# Patient Record
Sex: FEMALE | Race: WHITE | NOT HISPANIC OR LATINO | Employment: UNEMPLOYED | URBAN - METROPOLITAN AREA
[De-identification: names, ages, dates, MRNs, and addresses within clinical notes are randomized per-mention and may not be internally consistent; named-entity substitution may affect disease eponyms.]

---

## 2022-08-01 ENCOUNTER — APPOINTMENT (OUTPATIENT)
Dept: MRI IMAGING | Facility: HOSPITAL | Age: 57
End: 2022-08-01
Attending: EMERGENCY MEDICINE

## 2022-08-01 ENCOUNTER — HOSPITAL ENCOUNTER (EMERGENCY)
Facility: HOSPITAL | Age: 57
Discharge: HOME | End: 2022-08-01
Attending: EMERGENCY MEDICINE | Admitting: EMERGENCY MEDICINE

## 2022-08-01 VITALS
HEART RATE: 74 BPM | TEMPERATURE: 98.3 F | RESPIRATION RATE: 17 BRPM | SYSTOLIC BLOOD PRESSURE: 150 MMHG | DIASTOLIC BLOOD PRESSURE: 98 MMHG | WEIGHT: 150 LBS | OXYGEN SATURATION: 98 %

## 2022-08-01 DIAGNOSIS — R42 DIZZINESS: ICD-10-CM

## 2022-08-01 LAB
ANION GAP SERPL CALCULATED.3IONS-SCNC: 8 MMOL/L (ref 5–18)
BASOPHILS # BLD AUTO: 0.1 10E3/UL (ref 0–0.2)
BASOPHILS NFR BLD AUTO: 1 %
BUN SERPL-MCNC: 22 MG/DL (ref 8–22)
CALCIUM SERPL-MCNC: 8.7 MG/DL (ref 8.5–10.5)
CHLORIDE BLD-SCNC: 107 MMOL/L (ref 98–107)
CO2 SERPL-SCNC: 26 MMOL/L (ref 22–31)
CREAT SERPL-MCNC: 0.76 MG/DL (ref 0.6–1.1)
EOSINOPHIL # BLD AUTO: 0.2 10E3/UL (ref 0–0.7)
EOSINOPHIL NFR BLD AUTO: 4 %
ERYTHROCYTE [DISTWIDTH] IN BLOOD BY AUTOMATED COUNT: 11.8 % (ref 10–15)
ETHANOL SERPL-MCNC: <10 MG/DL
GFR SERPL CREATININE-BSD FRML MDRD: >90 ML/MIN/1.73M2
GLUCOSE BLD-MCNC: 127 MG/DL (ref 70–125)
HCG SERPL QL: NEGATIVE
HCT VFR BLD AUTO: 41.9 % (ref 35–47)
HGB BLD-MCNC: 14.3 G/DL (ref 11.7–15.7)
IMM GRANULOCYTES # BLD: 0 10E3/UL
IMM GRANULOCYTES NFR BLD: 0 %
LYMPHOCYTES # BLD AUTO: 1.6 10E3/UL (ref 0.8–5.3)
LYMPHOCYTES NFR BLD AUTO: 30 %
MCH RBC QN AUTO: 33.3 PG (ref 26.5–33)
MCHC RBC AUTO-ENTMCNC: 34.1 G/DL (ref 31.5–36.5)
MCV RBC AUTO: 97 FL (ref 78–100)
MONOCYTES # BLD AUTO: 0.5 10E3/UL (ref 0–1.3)
MONOCYTES NFR BLD AUTO: 10 %
NEUTROPHILS # BLD AUTO: 3.1 10E3/UL (ref 1.6–8.3)
NEUTROPHILS NFR BLD AUTO: 55 %
NRBC # BLD AUTO: 0 10E3/UL
NRBC BLD AUTO-RTO: 0 /100
PLATELET # BLD AUTO: 258 10E3/UL (ref 150–450)
POTASSIUM BLD-SCNC: 3.3 MMOL/L (ref 3.5–5)
RBC # BLD AUTO: 4.3 10E6/UL (ref 3.8–5.2)
SODIUM SERPL-SCNC: 141 MMOL/L (ref 136–145)
WBC # BLD AUTO: 5.5 10E3/UL (ref 4–11)

## 2022-08-01 PROCEDURE — 96361 HYDRATE IV INFUSION ADD-ON: CPT

## 2022-08-01 PROCEDURE — 250N000013 HC RX MED GY IP 250 OP 250 PS 637: Performed by: EMERGENCY MEDICINE

## 2022-08-01 PROCEDURE — A9585 GADOBUTROL INJECTION: HCPCS | Performed by: EMERGENCY MEDICINE

## 2022-08-01 PROCEDURE — 250N000011 HC RX IP 250 OP 636: Performed by: EMERGENCY MEDICINE

## 2022-08-01 PROCEDURE — 85025 COMPLETE CBC W/AUTO DIFF WBC: CPT | Performed by: EMERGENCY MEDICINE

## 2022-08-01 PROCEDURE — 255N000002 HC RX 255 OP 636: Performed by: EMERGENCY MEDICINE

## 2022-08-01 PROCEDURE — 258N000003 HC RX IP 258 OP 636: Performed by: EMERGENCY MEDICINE

## 2022-08-01 PROCEDURE — 84703 CHORIONIC GONADOTROPIN ASSAY: CPT | Performed by: EMERGENCY MEDICINE

## 2022-08-01 PROCEDURE — 96374 THER/PROPH/DIAG INJ IV PUSH: CPT | Mod: 59

## 2022-08-01 PROCEDURE — 36415 COLL VENOUS BLD VENIPUNCTURE: CPT | Performed by: EMERGENCY MEDICINE

## 2022-08-01 PROCEDURE — 82077 ASSAY SPEC XCP UR&BREATH IA: CPT | Performed by: EMERGENCY MEDICINE

## 2022-08-01 PROCEDURE — 99285 EMERGENCY DEPT VISIT HI MDM: CPT | Mod: 25

## 2022-08-01 PROCEDURE — 80048 BASIC METABOLIC PNL TOTAL CA: CPT | Performed by: EMERGENCY MEDICINE

## 2022-08-01 PROCEDURE — 70553 MRI BRAIN STEM W/O & W/DYE: CPT

## 2022-08-01 RX ORDER — MECLIZINE HYDROCHLORIDE 25 MG/1
25 TABLET ORAL 3 TIMES DAILY PRN
Qty: 20 TABLET | Refills: 0 | Status: SHIPPED | OUTPATIENT
Start: 2022-08-01 | End: 2022-11-20

## 2022-08-01 RX ORDER — GADOBUTROL 604.72 MG/ML
7 INJECTION INTRAVENOUS ONCE
Status: COMPLETED | OUTPATIENT
Start: 2022-08-01 | End: 2022-08-01

## 2022-08-01 RX ORDER — MECLIZINE HCL 12.5 MG 12.5 MG/1
25 TABLET ORAL ONCE
Status: COMPLETED | OUTPATIENT
Start: 2022-08-01 | End: 2022-08-01

## 2022-08-01 RX ORDER — ALBUTEROL SULFATE 90 UG/1
2 AEROSOL, METERED RESPIRATORY (INHALATION) EVERY 6 HOURS PRN
Status: DISCONTINUED | OUTPATIENT
Start: 2022-08-01 | End: 2022-08-02 | Stop reason: HOSPADM

## 2022-08-01 RX ORDER — ONDANSETRON 2 MG/ML
4 INJECTION INTRAMUSCULAR; INTRAVENOUS ONCE
Status: COMPLETED | OUTPATIENT
Start: 2022-08-01 | End: 2022-08-01

## 2022-08-01 RX ORDER — ACETAMINOPHEN 325 MG/1
650 TABLET ORAL ONCE
Status: DISCONTINUED | OUTPATIENT
Start: 2022-08-01 | End: 2022-08-02 | Stop reason: HOSPADM

## 2022-08-01 RX ADMIN — ONDANSETRON 4 MG: 2 INJECTION INTRAMUSCULAR; INTRAVENOUS at 16:47

## 2022-08-01 RX ADMIN — GADOBUTROL 7 ML: 604.72 INJECTION INTRAVENOUS at 21:06

## 2022-08-01 RX ADMIN — SODIUM CHLORIDE 1000 ML: 9 INJECTION, SOLUTION INTRAVENOUS at 16:47

## 2022-08-01 RX ADMIN — MECLIZINE 25 MG: 12.5 TABLET ORAL at 17:16

## 2022-08-01 NOTE — ED TRIAGE NOTES
Patient arrives by private car for evaluation of dizziness on and off for the last couple days and has been consistent since she woke this am.  Reports nausea and vomiting since yesterday.   Reports daily use of alcohol and meth.  Patient reports history of brain tumor.

## 2022-08-01 NOTE — ED PROVIDER NOTES
EMERGENCY DEPARTMENT ENCOUNTER      NAME: Jody Barrow  AGE: 57 year old female  YOB: 1965  MRN: [id number]  EVALUATION DATE & TIME: 2022  4:23 PM    PCP: No primary care provider on file.    ED PROVIDER: Klaus Chirinos M.D.      Chief Complaint   Patient presents with     Dizziness       FINAL IMPRESSION:  1. Dizziness        ED COURSE & MEDICAL DECISION MAKIN year old female presents to the Emergency Department for evaluation of vertigo and lightheadedness.  She has a history pertinent for reported pituitary tumor, alcohol and substance abuse.  She presents to the emergency department with dizziness which is primarily described as vertigo, with some occasional lightheadedness.  She does seem to struggle with dysmetria in the bilateral upper extremities on arrival.  The remainder of her neurological exam is nonfocal.  Cardiopulmonary exam unremarkable.  She was treated with IV fluids, Zofran, and meclizine here with good improvement in her symptoms.  She underwent a broad work-up including an MRI of her brain with and without contrast which actually did not visualize any pituitary tumor although was not timed for this specifically.  There is no evidence of CVA, stroke, or inflammatory change.  Labs with stable metabolic profile.  She was feeling symptomatically improved after initial interventions here, she is ambulatory.  She is homeless but plans to be in this area for now.  Encouraged her to set up primary care.  We reviewed instructions for supportive measures for vertigo.  She was prescribed meclizine.  She was discharged in stable condition.    At the conclusion of the encounter I discussed the results of all of the tests and the disposition. The questions were answered. The patient or family acknowledged understanding and was agreeable with the care plan.       MEDICATIONS GIVEN IN THE EMERGENCY:  Medications   albuterol (PROVENTIL HFA/VENTOLIN HFA) inhaler (has no  administration in time range)   acetaminophen (TYLENOL) tablet 650 mg (650 mg Oral Not Given 8/1/22 2157)   0.9% sodium chloride BOLUS (0 mLs Intravenous Stopped 8/1/22 2000)   ondansetron (ZOFRAN) injection 4 mg (4 mg Intravenous Given 8/1/22 1647)   meclizine (ANTIVERT) tablet 25 mg (25 mg Oral Given 8/1/22 1716)   gadobutrol (GADAVIST) injection 7 mL (7 mLs Intravenous Given 8/1/22 2106)       NEW PRESCRIPTIONS STARTED AT TODAY'S ER VISIT  Discharge Medication List as of 8/1/2022 10:08 PM      START taking these medications    Details   meclizine (ANTIVERT) 25 MG tablet Take 1 tablet (25 mg) by mouth 3 times daily as needed for dizziness, Disp-20 tablet, R-0, Local Print                =================================================================    HPI    Patient information was obtained from: Patient     Use of : N/A         Jody Barrow is a 57 year old female with a limited pertinent history who presents to this ED via private vehicle for evaluation of dizziness.     Patient reports three days of increasingly worse vertigo and lightheadedness with associated nausea and vomiting. This is new for the patient. She notes a history of a pituitary brain tumor that was found to change her hormones but she has decided to not remove it. Endorses headaches from the tumor. Patient is currently able to ambulate but feels unsteady. Denies use of medication for vertigo.     Denies shortness of breath.     Of note, during time of evaluation, patient experienced trouble focusing and blurry vision during finger-nose-finger test.       SHx: Endorses drinking and smoking meth.       REVIEW OF SYSTEMS   All systems reviewed and negative except as noted in HPI.    PAST MEDICAL HISTORY:  No past medical history on file.    PAST SURGICAL HISTORY:  No past surgical history on file.        CURRENT MEDICATIONS:    Current Facility-Administered Medications   Medication     acetaminophen (TYLENOL) tablet 650 mg      albuterol (PROVENTIL HFA/VENTOLIN HFA) inhaler     Current Outpatient Medications   Medication     meclizine (ANTIVERT) 25 MG tablet         ALLERGIES:  Allergies   Allergen Reactions     Codeine Anaphylaxis       FAMILY HISTORY:  No family history on file.    SOCIAL HISTORY:        VITALS:  BP (!) 150/98   Pulse 74   Temp 98.3  F (36.8  C)   Resp 17   Wt 68 kg (150 lb)   SpO2 98%     PHYSICAL EXAM    Constitutional: Disheveled and chronically ill-appearing middle-age female patient, laying in bed, no distress  HENT: Normocephalic, Atraumatic. Neck Supple.  Eyes: EOMI, Conjunctiva normal.  Respiratory: Breathing comfortably on room air. Speaks full sentences easily. Lungs clear to ascultation.  Cardiovascular: Normal heart rate, Regular rhythm. No peripheral edema.  Abdomen: Soft, nontender  Musculoskeletal: Good range of motion in all major joints. No major deformities noted.  Integument: Warm, Dry.  Neurologic: Awake, alert, oriented.  There are a few beats of bilateral horizontal nystagmus.  Cranial nerves II through XII intact.  Strength is 5 out of 5 throughout bilateral upper and lower extremities.  Patient has some dysmetria with finger-nose-finger testing bilaterally, more pronounced on the right.  Heel-to-shin testing is normal.  Psychiatric: Cooperative. Affect appropriate.     LAB:  All pertinent labs reviewed and interpreted.  Labs Ordered and Resulted from Time of ED Arrival to Time of ED Departure   BASIC METABOLIC PANEL - Abnormal       Result Value    Sodium 141      Potassium 3.3 (*)     Chloride 107      Carbon Dioxide (CO2) 26      Anion Gap 8      Urea Nitrogen 22      Creatinine 0.76      Calcium 8.7      Glucose 127 (*)     GFR Estimate >90     CBC WITH PLATELETS AND DIFFERENTIAL - Abnormal    WBC Count 5.5      RBC Count 4.30      Hemoglobin 14.3      Hematocrit 41.9      MCV 97      MCH 33.3 (*)     MCHC 34.1      RDW 11.8      Platelet Count 258      % Neutrophils 55      % Lymphocytes  30      % Monocytes 10      % Eosinophils 4      % Basophils 1      % Immature Granulocytes 0      NRBCs per 100 WBC 0      Absolute Neutrophils 3.1      Absolute Lymphocytes 1.6      Absolute Monocytes 0.5      Absolute Eosinophils 0.2      Absolute Basophils 0.1      Absolute Immature Granulocytes 0.0      Absolute NRBCs 0.0     HCG QUALITATIVE PREGNANCY - Normal    hCG Serum Qualitative Negative     ETHYL ALCOHOL LEVEL - Normal    Alcohol, Blood <10         RADIOLOGY:  Reviewed all pertinent imaging. Please see official radiology report.  MR Brain w/o & w Contrast   Final Result   IMPRESSION:   1.  No acute infarct, mass, mass effect, or hemorrhage.   2.  Mild chronic small vessel ischemia.   3.  Normal appearance of pituitary gland on the routine MRI. Please note dedicated imaging of the pituitary gland was not performed.            I, Tanna Siegel, am serving as a scribe to document services personally performed by Dr. Klaus Chirinos, based on my observation and the provider's statements to me. I, Klaus Chirinos MD attest that Tanna Siegel is acting in a scribe capacity, has observed my performance of the services and has documented them in accordance with my direction.    Klaus Chirinos M.D.  Emergency Medicine  [facility] EMERGENCY DEPARTMENT  [facility] [facility] [address]  [phone]  Dept: [phone]       Klaus Chirinos MD  08/02/22 0010     Attending Attestation (For Attendings USE Only)...

## 2022-08-01 NOTE — ED NOTES
ED Provider In Triage Note  [facility]  Encounter Date: Aug 1, 2022    Chief Complaint   Patient presents with     Dizziness       Brief HPI:   Jody Barrow is a 57 year old female presenting to the Emergency Department with a chief complaint of dizziness.  She notes this is a combination of feeling lightheaded, off balance, altered gait, and vertiginous complaints.    Brief Physical Exam:  Heart rate 87, oxygenation 99%.  Blood pressure elevated at 182/106.  States she does not take blood pressure medications.  General: Non-toxic appearing      Plan Initiated in Triage:  Orders Placed This Encounter     Head CT w/o contrast     Basic metabolic panel     HCG QUALitative pregnancy (blood)     0.9% sodium chloride BOLUS     ondansetron (ZOFRAN) injection 4 mg       PIT Dispo:   Return to [facility] while awaiting workup and ED bed availability    Man Bertrand MD on 8/1/2022 at 3:52 PM    Patient was evaluated by the Physician in Triage due to a limitation of available rooms in the Emergency Department. A plan of care was discussed based on the information obtained on the initial evaluation and patient was consuled to return back to the Emergency Department lob after this initial evalutaiton until results were obtained or a room became available in the Emergency Department. Patient was counseled not to leave prior to receiving the results of their workup.     Man Bertrand MD  [facility] EMERGENCY DEPARTMENT  [address] [place] [id number] [phone]     Man Bertrand MD  08/01/22 3224

## 2022-08-02 NOTE — ED NOTES
The pt reports that her dizziness feels better after medication but is having knee pain. Offered ice and heat but declined.

## 2022-08-02 NOTE — DISCHARGE INSTRUCTIONS
You were seen in the emergency department at [facility] for dizziness and nausea.  Your evaluation included a brain MRI and lab testing which all looked stable and encouraging.  You were treated with IV fluids and vertigo medication.  We are going to prescribe you some of this for home.  Please make sure you are drinking at least 2 to 3 L of water or sugar-free Gatorade a day to stay well-hydrated.  We would like you to follow-up and get established in primary care clinic soon as possible.  You can contact clinic using the information above

## 2022-08-02 NOTE — ED NOTES
Pt was asked about suicidal ideations related to triage assessment. Pt stated that she has been feeling very angry lately and feels that nothing is going right in her life and that she has lost seven people in her life in the last two years. When asked if she is suicidal she states she is just very angry. When asked if she had a plan she stated not anymore because it got screwed up. MD was updated.

## 2022-11-20 ENCOUNTER — TELEPHONE (OUTPATIENT)
Dept: BEHAVIORAL HEALTH | Facility: CLINIC | Age: 57
End: 2022-11-20

## 2022-11-20 ENCOUNTER — HOSPITAL ENCOUNTER (EMERGENCY)
Facility: CLINIC | Age: 57
Discharge: PSYCHIATRIC OR SUBSTANCE USE FACILITY | End: 2022-11-24
Attending: EMERGENCY MEDICINE | Admitting: EMERGENCY MEDICINE
Payer: MEDICAID

## 2022-11-20 DIAGNOSIS — R45.851 SUICIDAL IDEATION: ICD-10-CM

## 2022-11-20 DIAGNOSIS — Z72.89 SELF-MUTILATION: ICD-10-CM

## 2022-11-20 DIAGNOSIS — S51.812A LACERATION OF LEFT FOREARM, INITIAL ENCOUNTER: ICD-10-CM

## 2022-11-20 DIAGNOSIS — F33.2 SEVERE EPISODE OF RECURRENT MAJOR DEPRESSIVE DISORDER, WITHOUT PSYCHOTIC FEATURES (H): ICD-10-CM

## 2022-11-20 DIAGNOSIS — N39.0 URINARY TRACT INFECTION WITHOUT HEMATURIA, SITE UNSPECIFIED: ICD-10-CM

## 2022-11-20 PROBLEM — S89.91XA INJURY OF RIGHT KNEE: Status: ACTIVE | Noted: 2020-12-01

## 2022-11-20 PROBLEM — K22.11 ULCER OF ESOPHAGUS WITH BLEEDING: Status: ACTIVE | Noted: 2020-12-01

## 2022-11-20 LAB
ALBUMIN SERPL-MCNC: 4 G/DL (ref 3.4–5)
ALP SERPL-CCNC: 68 U/L (ref 40–150)
ALT SERPL W P-5'-P-CCNC: 65 U/L (ref 0–50)
AMPHETAMINES UR QL: DETECTED
ANION GAP SERPL CALCULATED.3IONS-SCNC: 7 MMOL/L (ref 3–14)
AST SERPL W P-5'-P-CCNC: 52 U/L (ref 0–45)
BARBITURATES UR QL SCN: NOT DETECTED
BASOPHILS # BLD AUTO: 0.1 10E3/UL (ref 0–0.2)
BASOPHILS NFR BLD AUTO: 1 %
BENZODIAZ UR QL SCN: NOT DETECTED
BILIRUB SERPL-MCNC: 0.5 MG/DL (ref 0.2–1.3)
BUN SERPL-MCNC: 15 MG/DL (ref 7–30)
BUPRENORPHINE UR QL: NOT DETECTED
CALCIUM SERPL-MCNC: 8.3 MG/DL (ref 8.5–10.1)
CANNABINOIDS UR QL: NOT DETECTED
CHLORIDE BLD-SCNC: 109 MMOL/L (ref 94–109)
CO2 SERPL-SCNC: 25 MMOL/L (ref 20–32)
COCAINE UR QL SCN: NOT DETECTED
CREAT SERPL-MCNC: 0.6 MG/DL (ref 0.52–1.04)
D-METHAMPHET UR QL: DETECTED
EOSINOPHIL # BLD AUTO: 0.1 10E3/UL (ref 0–0.7)
EOSINOPHIL NFR BLD AUTO: 1 %
ERYTHROCYTE [DISTWIDTH] IN BLOOD BY AUTOMATED COUNT: 11.7 % (ref 10–15)
ETHANOL SERPL-MCNC: 0.2 G/DL
GFR SERPL CREATININE-BSD FRML MDRD: >90 ML/MIN/1.73M2
GLUCOSE BLD-MCNC: 101 MG/DL (ref 70–99)
HCT VFR BLD AUTO: 49.9 % (ref 35–47)
HGB BLD-MCNC: 17.4 G/DL (ref 11.7–15.7)
IMM GRANULOCYTES # BLD: 0 10E3/UL
IMM GRANULOCYTES NFR BLD: 0 %
LYMPHOCYTES # BLD AUTO: 2.6 10E3/UL (ref 0.8–5.3)
LYMPHOCYTES NFR BLD AUTO: 24 %
MCH RBC QN AUTO: 33.3 PG (ref 26.5–33)
MCHC RBC AUTO-ENTMCNC: 34.9 G/DL (ref 31.5–36.5)
MCV RBC AUTO: 96 FL (ref 78–100)
METHADONE UR QL SCN: NOT DETECTED
MONOCYTES # BLD AUTO: 0.8 10E3/UL (ref 0–1.3)
MONOCYTES NFR BLD AUTO: 7 %
NEUTROPHILS # BLD AUTO: 7.1 10E3/UL (ref 1.6–8.3)
NEUTROPHILS NFR BLD AUTO: 67 %
NRBC # BLD AUTO: 0 10E3/UL
NRBC BLD AUTO-RTO: 0 /100
OPIATES UR QL SCN: NOT DETECTED
OXYCODONE UR QL SCN: NOT DETECTED
PCP UR QL SCN: NOT DETECTED
PLATELET # BLD AUTO: 303 10E3/UL (ref 150–450)
POTASSIUM BLD-SCNC: 4.1 MMOL/L (ref 3.4–5.3)
PROPOXYPH UR QL: NOT DETECTED
PROT SERPL-MCNC: 7.8 G/DL (ref 6.8–8.8)
RBC # BLD AUTO: 5.22 10E6/UL (ref 3.8–5.2)
SARS-COV-2 RNA RESP QL NAA+PROBE: NEGATIVE
SODIUM SERPL-SCNC: 141 MMOL/L (ref 133–144)
TRICYCLICS UR QL SCN: NOT DETECTED
TSH SERPL DL<=0.005 MIU/L-ACNC: 0.46 MU/L (ref 0.4–4)
WBC # BLD AUTO: 10.7 10E3/UL (ref 4–11)

## 2022-11-20 PROCEDURE — 84443 ASSAY THYROID STIM HORMONE: CPT | Performed by: EMERGENCY MEDICINE

## 2022-11-20 PROCEDURE — 96376 TX/PRO/DX INJ SAME DRUG ADON: CPT

## 2022-11-20 PROCEDURE — 99285 EMERGENCY DEPT VISIT HI MDM: CPT | Mod: 25

## 2022-11-20 PROCEDURE — 82077 ASSAY SPEC XCP UR&BREATH IA: CPT | Performed by: EMERGENCY MEDICINE

## 2022-11-20 PROCEDURE — 90471 IMMUNIZATION ADMIN: CPT | Performed by: EMERGENCY MEDICINE

## 2022-11-20 PROCEDURE — 90715 TDAP VACCINE 7 YRS/> IM: CPT | Performed by: EMERGENCY MEDICINE

## 2022-11-20 PROCEDURE — 99285 EMERGENCY DEPT VISIT HI MDM: CPT | Mod: 25 | Performed by: EMERGENCY MEDICINE

## 2022-11-20 PROCEDURE — 96361 HYDRATE IV INFUSION ADD-ON: CPT

## 2022-11-20 PROCEDURE — 258N000003 HC RX IP 258 OP 636: Performed by: EMERGENCY MEDICINE

## 2022-11-20 PROCEDURE — 250N000011 HC RX IP 250 OP 636: Performed by: FAMILY MEDICINE

## 2022-11-20 PROCEDURE — 80053 COMPREHEN METABOLIC PANEL: CPT | Performed by: EMERGENCY MEDICINE

## 2022-11-20 PROCEDURE — 250N000011 HC RX IP 250 OP 636: Performed by: EMERGENCY MEDICINE

## 2022-11-20 PROCEDURE — C9803 HOPD COVID-19 SPEC COLLECT: HCPCS

## 2022-11-20 PROCEDURE — U0005 INFEC AGEN DETEC AMPLI PROBE: HCPCS | Performed by: EMERGENCY MEDICINE

## 2022-11-20 PROCEDURE — 36415 COLL VENOUS BLD VENIPUNCTURE: CPT | Performed by: EMERGENCY MEDICINE

## 2022-11-20 PROCEDURE — 81001 URINALYSIS AUTO W/SCOPE: CPT | Mod: XU | Performed by: EMERGENCY MEDICINE

## 2022-11-20 PROCEDURE — 250N000013 HC RX MED GY IP 250 OP 250 PS 637: Performed by: EMERGENCY MEDICINE

## 2022-11-20 PROCEDURE — 96374 THER/PROPH/DIAG INJ IV PUSH: CPT

## 2022-11-20 PROCEDURE — 12005 RPR S/N/A/GEN/TRK12.6-20.0CM: CPT

## 2022-11-20 PROCEDURE — 12005 RPR S/N/A/GEN/TRK12.6-20.0CM: CPT | Performed by: EMERGENCY MEDICINE

## 2022-11-20 PROCEDURE — 96375 TX/PRO/DX INJ NEW DRUG ADDON: CPT

## 2022-11-20 PROCEDURE — 80306 DRUG TEST PRSMV INSTRMNT: CPT | Performed by: EMERGENCY MEDICINE

## 2022-11-20 PROCEDURE — 87186 SC STD MICRODIL/AGAR DIL: CPT | Performed by: EMERGENCY MEDICINE

## 2022-11-20 PROCEDURE — 90791 PSYCH DIAGNOSTIC EVALUATION: CPT

## 2022-11-20 PROCEDURE — 85025 COMPLETE CBC W/AUTO DIFF WBC: CPT | Performed by: EMERGENCY MEDICINE

## 2022-11-20 RX ORDER — SODIUM CHLORIDE 9 MG/ML
INJECTION, SOLUTION INTRAVENOUS CONTINUOUS
Status: DISCONTINUED | OUTPATIENT
Start: 2022-11-20 | End: 2022-11-20 | Stop reason: CLARIF

## 2022-11-20 RX ORDER — MAGNESIUM OXIDE 400 MG/1
800 TABLET ORAL ONCE
Status: COMPLETED | OUTPATIENT
Start: 2022-11-20 | End: 2022-11-20

## 2022-11-20 RX ORDER — KETOROLAC TROMETHAMINE 30 MG/ML
30 INJECTION, SOLUTION INTRAMUSCULAR; INTRAVENOUS ONCE
Status: COMPLETED | OUTPATIENT
Start: 2022-11-20 | End: 2022-11-20

## 2022-11-20 RX ORDER — OLANZAPINE 2.5 MG/1
5 TABLET, FILM COATED ORAL
Status: COMPLETED | OUTPATIENT
Start: 2022-11-20 | End: 2022-11-20

## 2022-11-20 RX ORDER — FOLIC ACID 1 MG/1
1 TABLET ORAL ONCE
Status: COMPLETED | OUTPATIENT
Start: 2022-11-20 | End: 2022-11-20

## 2022-11-20 RX ORDER — MULTIPLE VITAMINS W/ MINERALS TAB 9MG-400MCG
1 TAB ORAL ONCE
Status: COMPLETED | OUTPATIENT
Start: 2022-11-20 | End: 2022-11-20

## 2022-11-20 RX ADMIN — KETOROLAC TROMETHAMINE 30 MG: 30 INJECTION, SOLUTION INTRAMUSCULAR at 19:01

## 2022-11-20 RX ADMIN — Medication 800 MG: at 11:39

## 2022-11-20 RX ADMIN — Medication 1 TABLET: at 11:39

## 2022-11-20 RX ADMIN — FOLIC ACID 1 MG: 1 TABLET ORAL at 11:39

## 2022-11-20 RX ADMIN — KETOROLAC TROMETHAMINE 30 MG: 30 INJECTION, SOLUTION INTRAMUSCULAR at 11:38

## 2022-11-20 RX ADMIN — CLOSTRIDIUM TETANI TOXOID ANTIGEN (FORMALDEHYDE INACTIVATED), CORYNEBACTERIUM DIPHTHERIAE TOXOID ANTIGEN (FORMALDEHYDE INACTIVATED), BORDETELLA PERTUSSIS TOXOID ANTIGEN (GLUTARALDEHYDE INACTIVATED), BORDETELLA PERTUSSIS FILAMENTOUS HEMAGGLUTININ ANTIGEN (FORMALDEHYDE INACTIVATED), BORDETELLA PERTUSSIS PERTACTIN ANTIGEN, AND BORDETELLA PERTUSSIS FIMBRIAE 2/3 ANTIGEN 0.5 ML: 5; 2; 2.5; 5; 3; 5 INJECTION, SUSPENSION INTRAMUSCULAR at 18:50

## 2022-11-20 RX ADMIN — THIAMINE HCL TAB 100 MG 100 MG: 100 TAB at 11:39

## 2022-11-20 RX ADMIN — OLANZAPINE 5 MG: 2.5 TABLET, FILM COATED ORAL at 11:39

## 2022-11-20 RX ADMIN — SODIUM CHLORIDE 1000 ML: 9 INJECTION, SOLUTION INTRAVENOUS at 11:43

## 2022-11-20 ASSESSMENT — COLUMBIA-SUICIDE SEVERITY RATING SCALE - C-SSRS
4. HAVE YOU HAD THESE THOUGHTS AND HAD SOME INTENTION OF ACTING ON THEM?: YES
6. HAVE YOU EVER DONE ANYTHING, STARTED TO DO ANYTHING, OR PREPARED TO DO ANYTHING TO END YOUR LIFE?: YES
2. HAVE YOU ACTUALLY HAD ANY THOUGHTS OF KILLING YOURSELF?: YES
5. HAVE YOU STARTED TO WORK OUT OR WORKED OUT THE DETAILS OF HOW TO KILL YOURSELF? DO YOU INTEND TO CARRY OUT THIS PLAN?: YES
1. IN THE PAST MONTH, HAVE YOU WISHED YOU WERE DEAD OR WISHED YOU COULD GO TO SLEEP AND NOT WAKE UP?: YES
6. HAVE YOU EVER DONE ANYTHING, STARTED TO DO ANYTHING, OR PREPARED TO DO ANYTHING TO END YOUR LIFE?: YES
6. HAVE YOU EVER DONE ANYTHING, STARTED TO DO ANYTHING, OR PREPARED TO DO ANYTHING TO END YOUR LIFE?: CUTTING

## 2022-11-20 ASSESSMENT — ACTIVITIES OF DAILY LIVING (ADL)
ADLS_ACUITY_SCORE: 35
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
## 2022-11-20 NOTE — TELEPHONE ENCOUNTER
S: [facility] ED, DEC  Freida calling at 1:17 PM.  57 year old/Female presenting with SI     B: Pt arrived via EMS. Pt [place] [address] [place] [address]. Patient only had a shirt and underwear on. BRENDA 0.20. Patient had extensive cutting on arm that required sutures. Patient endorses SI and wants to die by cutting. Patient is homeless.   Pt affect in ED: guarded   Pt Dx: Major Depressive Disorder, alcohol use disorder  Previous IP hx? No  Pt endorses SI. Pt endorses SIB.   Pt denies HI. Pt denies hallucinations.   Hx of suicide attempt? No  Hx of aggression, or current concerns for aggression this visit? No  Pt is not prescribed medication. Pt is not medication compliant  Pt denies OP services: none  CD concerns: methamphetamines   Acute medical concerns: cut on arm, significant pain from knee replacement, brain tumor- DEC unsure of treatment for this, body aches  Does Pt present with any of the following: assistive devices, insulin pump, J/G tube, catheter, CPAP, continuous IV, continuous O2, bariatric needs, ADA needs? No  Is Pt their own guardian? yes  Pt is ambulatory.  Pt is  able to perform ADLs independently.    A: Pt meets criteria for review for [facility] admission. Patient is on a 72HH. Preferred placement: [facility]  COVID: In Process  Utox: Positive for methamphetamine and amphetamine  CMP: Abnormalities: Calcium 8.3, glucose 101, AST 52, ALT65  CBC: Abnormalities: RBC 5.22, hemoglobin 17.4, hematocrit 49.9, MCH 33.3  HCG: post menopausal     R: Patient cleared and ready for behavioral bed placement: Yes  Pt placed on [facility] worklist, Intake searching for appropriate bed placement for patient review.    1:28 PM Intake awaiting covid-19 result.

## 2022-11-20 NOTE — ED NOTES
Pt arm numbed up by provider and this nurse, provider and EDT in to wash and suture wounds. Pt ended up with 21 sutures total. Freida Garcia RN
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
## 2022-11-20 NOTE — PLAN OF CARE
Jody Barrow  November 20, 2022  Plan of Care Hand-off Note     Patient Care Path: Inpatient Mental Health    Plan for Care:   Pt recommended for inpatient mental health/chemical dependency hospitalization due to imminent risk to self at this time. Observation option discussed with ED MD but agreed to not be an appropriate option for pt due to significant risk factors. Pt  with significant self harm, suicidal ideation and recent substance use.  Pt's risk factors include:  Suicide ideation, self harm, no support, no connected providers, lack of financial resources, homelessness, drug use, grief and loss unresolved, ongoing depression, suspected hx of trauma. Pt unable to identify anything to live for.  Pt is unable to engage in safety planning of any kind at this time.  Pt should remain in IP care until deemed safe to return to community and engage in outpatient mental health/chemical dependency supports. Pt will need assistance connectin to services prior to discharge from IP.    Critical Safety Issues:   Recent self-harm and suicide attempt with ongoing depressed mood and substance abuse    Overview:  This patient is a child/adolescent: No    This patient has additional special visitor precautions: No    Legal Status: 72 HH expiring goes into effect 12:01     Contacts:   Pt refuses to provide.  Writer left voice message for emergency contact Marc Lovelace #[phone]    Psychiatry Consult:  Psychiatry Consult not requested because initial medication eval to be done in placement    Updated Attending Provider  regarding plan of care.    Lea Ortega

## 2022-11-20 NOTE — ED NOTES
Call placed to patient placement at [facility].  Aracely said she will make a couple calls but it is likely she will be here overnight because she is around #35 on the list of patients for placement.
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
## 2022-11-21 ENCOUNTER — APPOINTMENT (OUTPATIENT)
Dept: GENERAL RADIOLOGY | Facility: CLINIC | Age: 57
End: 2022-11-21
Attending: EMERGENCY MEDICINE
Payer: MEDICAID

## 2022-11-21 ENCOUNTER — TELEPHONE (OUTPATIENT)
Dept: BEHAVIORAL HEALTH | Facility: CLINIC | Age: 57
End: 2022-11-21

## 2022-11-21 LAB
ALBUMIN UR-MCNC: NEGATIVE MG/DL
APPEARANCE UR: CLEAR
BACTERIA #/AREA URNS HPF: ABNORMAL /HPF
BILIRUB UR QL STRIP: NEGATIVE
COLOR UR AUTO: YELLOW
GLUCOSE UR STRIP-MCNC: NEGATIVE MG/DL
HGB UR QL STRIP: ABNORMAL
KETONES UR STRIP-MCNC: NEGATIVE MG/DL
LEUKOCYTE ESTERASE UR QL STRIP: NEGATIVE
NITRATE UR QL: POSITIVE
PH UR STRIP: 5.5 [PH] (ref 5–7)
RBC URINE: <1 /HPF
SP GR UR STRIP: 1.02 (ref 1–1.03)
SQUAMOUS EPITHELIAL: <1 /HPF
UROBILINOGEN UR STRIP-MCNC: NORMAL MG/DL
WBC URINE: <1 /HPF

## 2022-11-21 PROCEDURE — 74018 RADEX ABDOMEN 1 VIEW: CPT

## 2022-11-21 PROCEDURE — 96375 TX/PRO/DX INJ NEW DRUG ADDON: CPT

## 2022-11-21 PROCEDURE — 250N000013 HC RX MED GY IP 250 OP 250 PS 637: Performed by: EMERGENCY MEDICINE

## 2022-11-21 PROCEDURE — 250N000011 HC RX IP 250 OP 636: Performed by: FAMILY MEDICINE

## 2022-11-21 PROCEDURE — 73562 X-RAY EXAM OF KNEE 3: CPT | Mod: RT

## 2022-11-21 RX ORDER — ACETAMINOPHEN 325 MG/1
650 TABLET ORAL ONCE
Status: DISCONTINUED | OUTPATIENT
Start: 2022-11-21 | End: 2022-11-24 | Stop reason: HOSPADM

## 2022-11-21 RX ORDER — DIAZEPAM 5 MG
5 TABLET ORAL ONCE
Status: COMPLETED | OUTPATIENT
Start: 2022-11-21 | End: 2022-11-21

## 2022-11-21 RX ORDER — KETOROLAC TROMETHAMINE 30 MG/ML
30 INJECTION, SOLUTION INTRAMUSCULAR; INTRAVENOUS ONCE
Status: DISCONTINUED | OUTPATIENT
Start: 2022-11-21 | End: 2022-11-21

## 2022-11-21 RX ORDER — KETOROLAC TROMETHAMINE 30 MG/ML
30 INJECTION, SOLUTION INTRAMUSCULAR; INTRAVENOUS ONCE
Status: COMPLETED | OUTPATIENT
Start: 2022-11-21 | End: 2022-11-21

## 2022-11-21 RX ADMIN — DIAZEPAM 5 MG: 5 TABLET ORAL at 10:13

## 2022-11-21 RX ADMIN — DIAZEPAM 5 MG: 5 TABLET ORAL at 16:05

## 2022-11-21 RX ADMIN — KETOROLAC TROMETHAMINE 30 MG: 30 INJECTION, SOLUTION INTRAMUSCULAR at 06:30

## 2022-11-21 RX ADMIN — NICOTINE POLACRILEX 2 MG: 2 GUM, CHEWING ORAL at 13:17

## 2022-11-21 RX ADMIN — NICOTINE POLACRILEX 2 MG: 2 GUM, CHEWING ORAL at 10:38

## 2022-11-21 RX ADMIN — NICOTINE POLACRILEX 2 MG: 2 GUM, CHEWING ORAL at 16:22

## 2022-11-21 ASSESSMENT — ACTIVITIES OF DAILY LIVING (ADL)
ADLS_ACUITY_SCORE: 35

## 2022-11-21 NOTE — ED NOTES
"BD offered Tylenol for pain, Lauri declined.   Fraya: \"Not going to help, my arm is burning, my stomach is burning, I am burning up inside\"  Lauri stated she \"just wants to go home\"   BD: \" where is home?\"  Fraya: \"just call [facility] to come get me, or I will just walk\"  BD: \"is home close to walk?\" \"Where is home\"  Fraya: \" you don't care, you want to send me to a [facility]\"  BD: \"I do care, I want to know where it is you want to go\"  Fraya: \"just call [facility]\"    Looked up the number to call [facility] \"[employer] in [facility]\".  Dialed number for patient to call, no answer. Pt left a brief message \" [facility] I am okay, at the hospital, they are keeping me a few days before sending me to a new place\"  Further search on the business/number acquired from the Internet shows the business is out of [facility]. BIBI Graham  "

## 2022-11-21 NOTE — ED PROVIDER NOTES
I assumed patient's care at change of shift from Dr. Young.  We are awaiting an inpatient psychiatric bed.  She is on a health officer hold which will need to be switched to a 72-hour hold at 2200.    6:55 AM:  Dr. Seo assumed her care at change of shift.  We are still waiting for an inpatient psych bed to open up.     Júnior Tolliver MD  11/21/22 0637

## 2022-11-21 NOTE — ED NOTES
"Patient is finished on phone and is requesting \"valium\" instead of \"ativan\". She states she will be cooperative and take the meds. Joshua Seo notified. Merline Dee RN    "

## 2022-11-21 NOTE — ED NOTES
"Patient received a call from \"Fred Miller\" who reports being her boyfriend. She is asked if she knows this person and if she would like to speak to him. She replied yes and phone is provided. Merline Dee RN   "

## 2022-11-21 NOTE — TELEPHONE ENCOUNTER
[facility] Access Inpatient Bed Call Log 11/21/2022 7:10 AM    Adults:    [facility] is posting 0 beds.       [facility] is posting 0 beds.     [facility] is posting 0 beds.     [facility] is posting 0 beds.     [facility] is posting 0 beds.     [facility] is posting 0 beds.     [facility] is posting 0 beds.     [facility] is posting 1 beds.        [facility] is posting 0 beds. Mixed unit 12+. Low acuity only.      [facility] is positing 0 beds. No aggression.      [facility] is posting 0 beds.      [facility] is posting 0 beds. Low acuity only.     [facility] is posting 0 beds.     [facility] is posting 0 beds. Low acuity.      [facility] is posting 0 beds. 72 hr hold required.      [facility] is posting 1 beds.      [facility] is posting 2 beds. Vol only, No Hx of aggression, violence or assault. No sexual offenders. No 72 hr holds.        [facility] is posting 1 beds. (Must have the cognitive ability to do programming. No aggressive or violent behavior or recent HX in the last 2 yrs. MH must be primary.)     [facility] is posting 0 beds. Low acuity only. Violence and aggression capped.      [facility] is posting 0 beds. Low acuity, Neg Covid.      [facility] is posting 1 beds. Covid neg. Vol only. Combined adolescent and adult unit. No aggressive or violent behavior. No registered sex offenders.      [facility] is posting 0 beds. Call for details.    [facility] is posting 0 beds.     7:20am There are no appropriate beds available at this time.

## 2022-11-21 NOTE — ED NOTES
Civil Commitment Status    Current commitment status is: under 72 Hour Hold expiring 11/28/22 at 12:01 AM    County involved: [facility] ([phone])    Petition for civil commitment sent to [facility] and verbal report given to [facility] commitment intake team at [facility].    Next steps include PPS to be assigned to case.

## 2022-11-21 NOTE — ED NOTES
Patient up to the bathroom with assistance. She is very sore from the events of yesterday. She was given Toradol for pain and IV was pulled.

## 2022-11-21 NOTE — ED NOTES
Writer into room as patient was asking for a cigarette. Writer discussed with patient alternatives to cigarettes including medications, nicotine patch, gum etc. Patient continued to argue with writer. Writer left room and patient threw meal tray and hart stand at the door. Security present. Merline Dee RN

## 2022-11-21 NOTE — ED NOTES
"Triage & Transition Services, Extended Care     Jody Barrow  November 21, 2022    Lauri is followed related to 72 Hour Hold: expires 11/28/22 at 12:01 AM. Please see initial DEC Crisis Assessment completed for complete assessment information. Medical record is reviewed. While patient is in the ED, care team is working towards Learn and Demonstrate at [employer] on Crisis Stabilization. Additional notes include SI and crawling in underwear and shirt outside on or near [address].    Per RN: Pt is cooperative off and on. Having outburst, threw breakfast tray at staff. Pt has been aggressive towards staff.    This writer attempted to meet with pt. Pt was guarded and yelling at this writer. Pt reported she does not want to meet and \"does not want to participate with anyone from the hospital anymore\". Pt wants to leave. Unable to engage patient in conversation. Pt declined to talk about what brought her into the hospital or current symptoms.    There are not significant status changes.     Plan:  This writer is unable to engage pt in any assessment today. Pt was guarded and angry. Disposition for inpatient mental health will not change at this time. Pt wants to leave home. MI commitment will have to be pursue.    Plan for Care reviewed with Assigned Medical Provider? Yes. Provider, Dr. Fernando Seo, response: Concurs with treatment plan.    Extended Care will follow and meet with patient/family/care team as able or requested.     Marti Dodd, [facility], Extended Care   [phone]        "

## 2022-11-21 NOTE — TELEPHONE ENCOUNTER
No appropriate beds currently available within the  system. Bed search update @  01:24:      [facility]: @ cap per website      [facility]: @ cap per website      [facility]: @ cap per website      [facility]: @ cap per website      [facility]: @ cap per website      [facility]: @ cap per website  [facility]: @ cap per website      [facility]: @ cap per website      [facility]: @ cap per website      [facility]: @ cap per website      [facility]: @ cap per website      [facility]: @ cap per website      [facility]: @ cap per website  [facility]: @ cap per website      [facility]: @ cap per website      [facility]: Posting 1 bed. Low acuity. Per Jackie @ 01:02, [facility] is completely at capacity for  beds tonight, but intake can call back later today      [facility]: Posting 2 beds. No hx of aggression. Voluntary admissions only. Pt is on a 72 HH  [facility]: Posting 2 beds. Low acuity only. Must have the cognitive ability to do programming. No aggressive or violent behavior or recent HX in the last 2 yrs. MH must be primary.   Per Shira @ 01:24, due to their current milieu acuity, they are not reviewing any referrals tonight but she suggests calling back after 12PM     [facility]: @ cap per website      [facility]: @ [facility] per website  [facility]: @ cap per website  [facility]: @ cap per website      [facility]: @ cap per website           Pt remains on work list until appropriate placement is available

## 2022-11-21 NOTE — ED PROVIDER NOTES
I assumed care of this patient at 7 AM from my colleague pending inpatient psychiatric bed.  This patient presented initially after being found along the highway with no clothing in the cold environment.  There was reports of suicidal ideation the patient had a DEC assessment who deemed her holdable and placed her on a 72-hour hold.  During my shift the patient is remained hemodynamically stable.  She did complain of right knee pain.  X-ray imaging demonstrate no sign of acute bony pathology.  She did receive Valium for anxiety.  Reassessment by mental health professional expressed continued concern of patient's mental wellbeing and active working on a commitment.  Signed over to my colleague at 1700 pending inpatient placement for psychiatric care.       Fernando Seo MD  11/21/22 [id number]

## 2022-11-21 NOTE — TELEPHONE ENCOUNTER
R:   No appropriate [facility] beds within [place].    Inpatient Evening Bed Call Log 11/20/2022 5:30 PM    Adults:      [facility] is posting 0 beds.       [facility] is posting 0 beds.     [facility] is posting 0 beds.     [facility] is posting 0 beds.     [facility] is posting 0 beds.     [facility] is posting 0 beds.       [facility] is posting 0 beds.     [facility] is posting 0 beds.     [facility]  is posting 0 beds.    [facility] is posting 1 bed. Mixed unit 12+. Low acuity only.      [facility] is positing 0 beds. No aggression.      [facility] is posting 0 beds.      [facility] is posting 1 beds. Low acuity only.     [facility] is posting 1 bed.     [facility] is posting 0 bed. Low acuity.     [facility] is posting 0 beds.    [facility] is posting 1 beds. 72 hr hold required.      [facility] is [facility] beds.      [facility] is posting 0 beds. Vol only, No Hx of aggression, violence or assault. No sexual offenders. No 72 hr holds.     [facility] is posting 4 beds. (Must have the cognitive ability to do programming. No aggressive or violent behavior or recent HX in the last 2 yrs. MH must be primary.)     [facility] is posting 0 beds. Low acuity only. Violence and aggression capped.      [facility] is posting 0 beds. Low acuity, Neg Covid.      [facility] is posting 1 bed. Covid neg. Vol only. Combined adolescent and adult unit. No aggressive or violent behavior. No registered sex offenders.      [facility] is posting 0 beds. Call for details.     [facility] is posting 2 beds. Low acuity only.    Pt remains on work list until appropriate placement is available

## 2022-11-21 NOTE — ED NOTES
Patient is complaining of R) knee pain and asking for a wrap. She is notified a wrap may not be feasible due to her suicidal ideation. Her R) knee appears more swollen than the left. Dr. Seo notified and into see patient. Merline Dee RN

## 2022-11-22 ENCOUNTER — TELEPHONE (OUTPATIENT)
Dept: BEHAVIORAL HEALTH | Facility: CLINIC | Age: 57
End: 2022-11-22

## 2022-11-22 PROCEDURE — 250N000013 HC RX MED GY IP 250 OP 250 PS 637: Performed by: FAMILY MEDICINE

## 2022-11-22 PROCEDURE — 250N000013 HC RX MED GY IP 250 OP 250 PS 637: Performed by: EMERGENCY MEDICINE

## 2022-11-22 RX ORDER — OLANZAPINE 2.5 MG/1
5 TABLET, FILM COATED ORAL ONCE
Status: COMPLETED | OUTPATIENT
Start: 2022-11-22 | End: 2022-11-22

## 2022-11-22 RX ORDER — DIAZEPAM 5 MG
5 TABLET ORAL EVERY 6 HOURS PRN
Status: DISCONTINUED | OUTPATIENT
Start: 2022-11-22 | End: 2022-11-23

## 2022-11-22 RX ORDER — SULFAMETHOXAZOLE/TRIMETHOPRIM 800-160 MG
1 TABLET ORAL 2 TIMES DAILY
Status: DISCONTINUED | OUTPATIENT
Start: 2022-11-23 | End: 2022-11-23

## 2022-11-22 RX ORDER — SULFAMETHOXAZOLE/TRIMETHOPRIM 800-160 MG
1 TABLET ORAL 2 TIMES DAILY
Status: DISCONTINUED | OUTPATIENT
Start: 2022-11-22 | End: 2022-11-22

## 2022-11-22 RX ORDER — IBUPROFEN 600 MG/1
600 TABLET, FILM COATED ORAL ONCE
Status: COMPLETED | OUTPATIENT
Start: 2022-11-22 | End: 2022-11-23

## 2022-11-22 RX ADMIN — NICOTINE POLACRILEX 2 MG: 2 GUM, CHEWING ORAL at 07:30

## 2022-11-22 RX ADMIN — DIAZEPAM 5 MG: 5 TABLET ORAL at 13:42

## 2022-11-22 RX ADMIN — NICOTINE POLACRILEX 2 MG: 2 GUM, CHEWING ORAL at 11:29

## 2022-11-22 RX ADMIN — DIAZEPAM 5 MG: 5 TABLET ORAL at 07:30

## 2022-11-22 RX ADMIN — OLANZAPINE 5 MG: 2.5 TABLET, FILM COATED ORAL at 15:10

## 2022-11-22 RX ADMIN — NICOTINE POLACRILEX 2 MG: 2 GUM, CHEWING ORAL at 13:24

## 2022-11-22 ASSESSMENT — ACTIVITIES OF DAILY LIVING (ADL)
ADLS_ACUITY_SCORE: 35

## 2022-11-22 NOTE — ED PROVIDER NOTES
Patient was signed out at change of shift by Dr. Benitez.  Please see her note and Dr. Wagner note for more details.  Patient remained stable throughout the stay.  Patient did get somewhat anxious this afternoon and this morning and required as needed Valium.  It got a little bit worse this afternoon so patient was given some Zyprexa which may be helped a bit.  Patient remained stable.  I talked to the DEC who states that they are working on commitment proceedings and patient has an appointment with the court this afternoon.  This disposition is still pending.  Patient will be signed out at change of shift for continued management.     Ollie Wong MD  11/22/22 [id number]

## 2022-11-22 NOTE — ED PROVIDER NOTES
Signout received at change of shift from Dr. Rodrick Del Rio.  See his note and other providers previous notes for patient presenting details and work-up.  Briefly, this is a 57-year-old female who [place] [address] [place].  Had deep cuts from self-harm.  Lacerations have been repaired.  Had been assessed by DEC.  Arrived intoxicated, homeless, concern for patient's safety and suicidal ideation with deliberate self-harm..  Has been placed on a 72-hour hold, and since appropriate placement may not be found prior to hold expiring, there has been a petition for civil commitment sent to [facility].    Remained stable throughout ED stay.  Remains on 72-hour hold.  Will sign out at change of shift to Dr. Ollie Wong to follow-up on placement if it becomes available, or with behavioral health as needed.     Ekta Benitez, DO  11/22/22 1634
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
## 2022-11-22 NOTE — ED NOTES
Pt had requested medication including valium and nicotine gum, she was given these medications and then requested to have blankets as she was getting chilled, she was given blankets and then told her breakfast would be coming in the next half hour or so, she was also asking when she could have her medications and was told the gum could be every hour so she requested to have again after her breakfast, pt is calm and cooperative and is resting at this time

## 2022-11-22 NOTE — TELEPHONE ENCOUNTER
No appropriate beds currently available within the  system.  Bed search update @ 3AM:      [facility]: @ cap per website      [facility]: @ cap per website      [facility]: @ cap per website      [facility]: @ cap per website      [facility]: @ cap per website      [facility]: @ cap per website   [facility]: @ cap per website     [facility]: @ cap per website      [facility]: Posting 1 bed. Low acuity / Mixed unit. Per Claudia @ 03:05 she has 1 pt in their ED they are reviewing for their last bed but intake can check back later           [facility]: @ cap per website      [facility]: @ cap per website      [facility]: @ cap per website      [facility]: Does review after 10PM      [facility]: Posting 1 bed. Per  @ 00:10, they are reviewing to cap - intake can call back later       [facility]: @ cap per website      [facility]: Posting 4 beds.  Per  @ 00:10, they are reviewing to cap - intake can call back later          [facility]: Posting 1 bed. No hx of aggression. Voluntary patients only. Meets exclusionary criteria d/t [facility] [place] campus: Posting 2 beds. Must have the cognitive ability to do programming. No aggressive or violent behavior or recent HX in the last 2 yrs. MH must be primary. Discussed w/ Blaze @ 03:17 who reports that based screening questions pt not appropriate d/t CD concerns and concerns regarding intensity of pt's cutting requiring sutures (could be a medical issue w/ facility)      [facility]: @ cap per website      [facility] @ [facility] per [facility]  [facility]: @ cap per website  [facility]: Posting 4 beds. Meets exclusionary criteria d/t 72 [facility]: @ cap per website           Pt remains on work list until appropriate placement is available

## 2022-11-22 NOTE — ED NOTES
Marissa from [facility] called and said she would be here around  this morning to interview patient in regards to her civil commitment

## 2022-11-22 NOTE — TELEPHONE ENCOUNTER
R: The pt is currently in the [facility] ED awaiting placement for anywhere in [place]. Patient has been placed on a 72HH started 11/20 @9:15PM and [facility] ext care has filed for petition for commitment      Intake Morning Bed Search (Done at 11:16 AM)  [facility] is posting 0 beds.   [facility] is posting 0 beds.  [facility] is posting 0 beds.  [facility] is posting 0 beds.  [facility] is posting 0 beds.  [facility] is posting 0 beds.  [facility] is posting 0 beds.  [facility] is posting 0 beds. Low acuity.     [facility] is posting 1 bed. Mixed unit 12+. Low acuity only. Pt does not fit open bed available for mixed unit due to aggression towards staff, threw tray at RN   [facility] is posting 0 beds. No aggression.   [facility] is posting 0 beds.   [facility] is posting 1 beds. Low acuity only. Pt does not fit open bed available for mixed unit due to aggression towards staff, threw tray at RN   [facility] is posting 0 beds. Low acuity only.   [facility] is posting 0 beds. 0 acute, 0 med psych, 0 mood disorder- very low acuity.   [facility] is posting 2 beds. Low acuity only. 72 hr hold required. Pt does not fit open bed available:  Commitment has been filed for this patient and this is exclusionary at the facility.    [facility] is posting 2 beds. Low acuity. Pt does not fit open bed available for mixed unit due to aggression towards staff, threw tray at RN      [facility] is posting 1 beds. Vol only, No Hx of aggression, violence, or assault. No sexual offenders. No 72 hr holds. Pt does not fit open bed available: patient is on commitment and this is exclusionary at the facility.    [facility] is posting 2 beds. (Must have the cognitive ability to do programming. No aggressive or violent behavior or recent HX in the last 2 yrs. MH must be primary). Pt does not fit open bed available for mixed unit due to aggression towards  staff, threw tray at RN   [facility] is posting 0 beds. Low acuity only. Violence and aggression capped.   [facility] is posting 0 beds. Low acuity, Neg Covid.   [facility] is posting 0 beds. Covid neg. Vol only. Combined adolescent and adult unit. No aggressive or violent behavior. No registered sex offenders.   [facility] is posting 6 beds. No Covid needed. Call for details. Pt does not fit open bed available: patient has petition for commitment filed in [place] and must be placed in [place], facility in [place], [place].   [facility] is posting 0 beds. Mixed Unit (13+). Low acuity only.      [facility] and [place] location at capacity. The pt remains on the waitlist. Intake continues to identify appropriate bed placement.

## 2022-11-23 ENCOUNTER — TELEPHONE (OUTPATIENT)
Dept: BEHAVIORAL HEALTH | Facility: CLINIC | Age: 57
End: 2022-11-23

## 2022-11-23 ENCOUNTER — MEDICAL CORRESPONDENCE (OUTPATIENT)
Dept: HEALTH INFORMATION MANAGEMENT | Facility: CLINIC | Age: 57
End: 2022-11-23

## 2022-11-23 LAB — BACTERIA UR CULT: ABNORMAL

## 2022-11-23 PROCEDURE — 250N000013 HC RX MED GY IP 250 OP 250 PS 637: Performed by: EMERGENCY MEDICINE

## 2022-11-23 PROCEDURE — 250N000013 HC RX MED GY IP 250 OP 250 PS 637: Performed by: FAMILY MEDICINE

## 2022-11-23 RX ORDER — LIDOCAINE 4 G/G
2 PATCH TOPICAL
Status: DISCONTINUED | OUTPATIENT
Start: 2022-11-24 | End: 2022-11-23

## 2022-11-23 RX ORDER — NICOTINE 21 MG/24HR
1 PATCH, TRANSDERMAL 24 HOURS TRANSDERMAL DAILY
Status: DISCONTINUED | OUTPATIENT
Start: 2022-11-23 | End: 2022-11-24 | Stop reason: HOSPADM

## 2022-11-23 RX ORDER — OLANZAPINE 2.5 MG/1
5 TABLET, FILM COATED ORAL DAILY PRN
Status: DISCONTINUED | OUTPATIENT
Start: 2022-11-23 | End: 2022-11-24 | Stop reason: HOSPADM

## 2022-11-23 RX ORDER — LORAZEPAM 1 MG/1
1 TABLET ORAL EVERY 4 HOURS PRN
Status: DISCONTINUED | OUTPATIENT
Start: 2022-11-23 | End: 2022-11-24 | Stop reason: HOSPADM

## 2022-11-23 RX ORDER — LIDOCAINE 4 G/G
2 PATCH TOPICAL
Status: DISCONTINUED | OUTPATIENT
Start: 2022-11-23 | End: 2022-11-24 | Stop reason: HOSPADM

## 2022-11-23 RX ORDER — CIPROFLOXACIN 500 MG/1
500 TABLET, FILM COATED ORAL EVERY 12 HOURS SCHEDULED
Status: DISCONTINUED | OUTPATIENT
Start: 2022-11-23 | End: 2022-11-24 | Stop reason: HOSPADM

## 2022-11-23 RX ORDER — CAPSAICIN 0.75 MG/G
CREAM TOPICAL 3 TIMES DAILY
Status: DISCONTINUED | OUTPATIENT
Start: 2022-11-23 | End: 2022-11-24 | Stop reason: HOSPADM

## 2022-11-23 RX ADMIN — NICOTINE POLACRILEX 2 MG: 2 GUM, CHEWING ORAL at 05:11

## 2022-11-23 RX ADMIN — CIPROFLOXACIN HYDROCHLORIDE 500 MG: 500 TABLET, FILM COATED ORAL at 07:53

## 2022-11-23 RX ADMIN — DIAZEPAM 5 MG: 5 TABLET ORAL at 05:10

## 2022-11-23 RX ADMIN — Medication 1 PATCH: at 08:28

## 2022-11-23 RX ADMIN — IBUPROFEN 600 MG: 600 TABLET ORAL at 08:29

## 2022-11-23 RX ADMIN — OLANZAPINE 5 MG: 2.5 TABLET, FILM COATED ORAL at 12:59

## 2022-11-23 RX ADMIN — LORAZEPAM 1 MG: 1 TABLET ORAL at 12:59

## 2022-11-23 RX ADMIN — LORAZEPAM 1 MG: 1 TABLET ORAL at 16:02

## 2022-11-23 RX ADMIN — LORAZEPAM 1 MG: 1 TABLET ORAL at 20:54

## 2022-11-23 RX ADMIN — CAPSAICIN: 0.75 CREAM TOPICAL at 08:30

## 2022-11-23 RX ADMIN — CIPROFLOXACIN HYDROCHLORIDE 500 MG: 500 TABLET, FILM COATED ORAL at 20:54

## 2022-11-23 RX ADMIN — LIDOCAINE 2 PATCH: 560 PATCH PERCUTANEOUS; TOPICAL; TRANSDERMAL at 16:02

## 2022-11-23 RX ADMIN — LORAZEPAM 1 MG: 1 TABLET ORAL at 07:53

## 2022-11-23 ASSESSMENT — ACTIVITIES OF DAILY LIVING (ADL)
ADLS_ACUITY_SCORE: 35

## 2022-11-23 NOTE — ED PROVIDER NOTES
Patient was signed out again at change of shift by Dr. Larose, please see previous notes for more details.  Today patient did not have any significant issues.  She continues to have anxiety issues and I changed her from Valium to Ativan but this did not help much.  She states the Zyprexa she was given yesterday did help a lot so I did give her another dose and this is written daily as needed.  Patient's commitment paperwork did go through and patient was committed by the county through the courts.  DEC reevaluated the patient and still feels that patient cannot contract for safety and still is recommending inpatient evaluation.  There are multiple people waiting ahead of her and there are no beds currently.  Patient will continue to be monitored.  Patient will be signed out at change of shift to [facility] for continued monitoring.     Ollie Wong MD  11/23/22 [id number]

## 2022-11-23 NOTE — ED PROVIDER NOTES
Patient signed out to me at 5 PM on psychiatric hold.  She has remained stable.  She is awaiting placement.  I spoke with the DEC.  They are pursuing commitment.  However Isabel peterson said we need to talk with Marti tomorrow for further update.   Hold continues.  She is on the list for a bed     Rodrick Del Rio MD  11/22/22 9216

## 2022-11-23 NOTE — TELEPHONE ENCOUNTER
[facility] Access Inpatient Bed Call Log 11/23/2022 7:34 AM    Adults:    [facility] is posting 0 beds.       [facility] is posting 0 beds.     [facility] is posting 0 beds.     [facility] is posting 0 beds.     [facility] is posting [facility] beds.     [facility] is [facility] beds.     [facility] is posting 0 beds.     [facility] is posting 0 beds.        [facility] is posting 0 beds. Mixed unit 12+. Low acuity only.      [facility] is positing 0 beds. No aggression.      [facility] is posting 0 beds.      [facility] is posting 1 beds. Low acuity only.     [facility] is posting 1 beds.     [facility] is posting 0 beds. Low acuity.      [facility] is posting 2 beds. 72 hr hold required.      [facility] is posting 2 beds.      [facility] is posting 1 beds. Vol only, No Hx of aggression, violence or assault. No sexual offenders. No 72 hr holds.        [facility] is posting 4 beds. (Must have the cognitive ability to do programming. No aggressive or violent behavior or recent HX in the last 2 yrs. MH must be primary.)     [facility] is posting 0 beds. Low acuity only. Violence and aggression capped.      [facility] is posting 0 beds. Low acuity, Neg Covid.      [facility] is posting 0 beds. Covid neg. Vol only. Combined adolescent and adult unit. No aggressive or violent behavior. No registered sex offenders.      [facility] is posting 6 beds. Call for details.    [facility] is posting 4 beds.     7:30am There are no appropriate beds available at this time.     12:05pm Intake called [facility]. They are going to review the pt's information and call intake back.     12:10pm Intake called [place] and spoke with Gauri. [place], [place], and [place] are at [facility].     12:19pm Intake called [facility] and spoke with Maribell who transferred intake to another coordinator.  Intake left a  for a call back.     3pm Intake received a call from care coordinator at [facility]. This pt was declined due to acuity.

## 2022-11-23 NOTE — ED NOTES
Triage & Transition Services, Extended Care     Jody Barrow  November 23, 2022    This writer received a call from Marissa ([phone]) from [facility] Pre-petition team. Pt's petition has been supported and is now under a court hold. Pt is pending a court hearing. All documents will be faxed directly to the ED. This writer requested for a copy of the court holds if possible.    Writer reported they would be faxing it to all department. Dr. Del Rio was informed via email regarding petition.      Marti Dodd, [facility], Extended Care   [phone]

## 2022-11-23 NOTE — ED NOTES
S  She has been on a watch for >70 hours and has concerns of anxiety, pain and wound care.  B  She is on a hold and waiting on a bed for her suicidal thoughts. She says she would like anxiety meds offered as soon as they are available to avoid her getting severely anxious, Her R arm and knee ache and she has sutures on her self inflicted wounds on her L forearm that are itching  A  She is calm and cooperative and expressing her needs  R  Plan for offering her ativan as soon as it is available on the MAR, Wound care to L forearm and cover, biofreeze type cream to her aching arm and knee and a nicotine patch because the gum wasn't helping, then rest.

## 2022-11-23 NOTE — TELEPHONE ENCOUNTER
R:  No appropriate beds within [place].  Bed search update 3:00AM    [facility]: @ cap per website  [facility]:@ cap per website  [facility]: @ cap per website  [facility]: @ cap per website  [facility]: @ cap per wbte  [facility]: @ cap per website  [facility] 1 bed available.  Mixed unit with children.  Not appropriate.  [facility]: @ cap per website  [facility]: @ cap per website  [facility]: 1 low acuity bed per website .  Not appropriate  [facility]: 2 low acuity bed available.  Not appropriate due to acuity  [facility]: 1 bed posted- Spoke to Jose Eduardo, they are capped for [facility]: 2 low acuity beds posted.  No beds available.  [facility]: 1 low acuity beds.  Voluntary only  [facility]: 4 low acuity bed- No security.  Not appropriate due to acuity  [facility]:  @ cap per website  [facility]: @ cap per website  [facility]: @ cap per website  [facility]: 4 low acuity beds.  Not appropriate.    Pt remains on work list until appropriate placement is available

## 2022-11-23 NOTE — TELEPHONE ENCOUNTER
[facility] Access Inpatient Bed Call Log 11/22/2022 5:30 PM        Adults:    [facility] is posting 0 beds.     [facility] is posting 0 beds.   [facility] is posting 0 beds[facility]   [facility] is posting 0 beds.   [facility] is posting 0 beds.   [facility] is posting 0 beds.   [facility] is posting 0 beds.   [facility] is posting 0 beds.        [facility] is posting 0 beds. Mixed unit 12+. Low acuity only.    [facility] is positing 0 beds. No aggression.    [facility] is posting 0 beds.    [facility] is posting 1 beds. Low acuity only.   [facility] is posting 1 beds.   [facility] is posting 1 beds. Low acuity.    [facility] is posting 2 beds. 72 hr hold required.    [facility] is posting 2 beds.    [facility] is posting 1 beds. Vol only, No Hx of aggression, violence or assault. No sexual offenders. No 72 hr holds.        [facility] is posting 2 beds. (Must have the cognitive ability to do programming. No aggressive or violent behavior or recent HX in the last 2 yrs. MH must be primary.)   [facility] is posting 0 beds. Low acuity only. Violence and aggression capped.    [facility] is posting 0 beds. Low acuity, Neg Covid.    [facility] is posting 0 beds. Covid neg. Vol only. Combined adolescent and adult unit. No aggressive or violent behavior. No registered sex offenders.    [facility] is posting 6 beds. Call for details.  [facility] is posting 0 beds.
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
## 2022-11-23 NOTE — ED PROVIDER NOTES
I was asked to take over the care of this patient at shift change by Dr. Rodrick Del Rio.  Dr. Del Rio stated that patient is waiting psychiatric inpatient hospital transfer but currently no beds are available in the [facility].  She is currently on a 72-hour hold and stated that commitment procedures have been initiated.  He stated the patient has a history for delirium and acute intoxication with alcohol, methamphetamines, and THC.  She apparently is homeless and recently arrived here from the [facility].  She was initially evaluated secondary to multiple self cutting's on her arms which were repaired.  She has been evaluated by the DEC service who recommended inpatient admission to psychiatric facility.  Patient is being monitored one-to-one by the ER staff.    Patient with uneventful night through the night shift.  Currently there are still no available beds in the [facility] willing to accommodate this patient.  Patient signed out to Dr. Wong at shift change.         David Larose, DO  11/23/22 2576

## 2022-11-24 ENCOUNTER — HOSPITAL ENCOUNTER (INPATIENT)
Facility: CLINIC | Age: 57
LOS: 21 days | Discharge: NURSING, REHAB OR LONG-TERM CARE | End: 2022-12-15
Attending: PSYCHIATRY & NEUROLOGY | Admitting: PSYCHIATRY & NEUROLOGY
Payer: MEDICAID

## 2022-11-24 VITALS
RESPIRATION RATE: 16 BRPM | DIASTOLIC BLOOD PRESSURE: 80 MMHG | HEART RATE: 94 BPM | OXYGEN SATURATION: 98 % | SYSTOLIC BLOOD PRESSURE: 114 MMHG | TEMPERATURE: 97.8 F | WEIGHT: 145 LBS

## 2022-11-24 DIAGNOSIS — Z59.00 HOMELESS SINGLE PERSON: Primary | ICD-10-CM

## 2022-11-24 PROCEDURE — 124N000002 HC R&B MH UMMC

## 2022-11-24 PROCEDURE — 99223 1ST HOSP IP/OBS HIGH 75: CPT | Mod: AI | Performed by: PSYCHIATRY & NEUROLOGY

## 2022-11-24 PROCEDURE — 250N000013 HC RX MED GY IP 250 OP 250 PS 637

## 2022-11-24 PROCEDURE — L1820 KO ELAS W/ CONDYLE PADS & JO: HCPCS

## 2022-11-24 RX ORDER — OLANZAPINE 10 MG/2ML
10 INJECTION, POWDER, FOR SOLUTION INTRAMUSCULAR 3 TIMES DAILY PRN
Status: DISCONTINUED | OUTPATIENT
Start: 2022-11-24 | End: 2022-12-15 | Stop reason: HOSPADM

## 2022-11-24 RX ORDER — MAGNESIUM HYDROXIDE/ALUMINUM HYDROXICE/SIMETHICONE 120; 1200; 1200 MG/30ML; MG/30ML; MG/30ML
30 SUSPENSION ORAL EVERY 4 HOURS PRN
Status: DISCONTINUED | OUTPATIENT
Start: 2022-11-24 | End: 2022-12-15 | Stop reason: HOSPADM

## 2022-11-24 RX ORDER — OLANZAPINE 10 MG/1
10 TABLET ORAL 3 TIMES DAILY PRN
Status: DISCONTINUED | OUTPATIENT
Start: 2022-11-24 | End: 2022-12-15 | Stop reason: HOSPADM

## 2022-11-24 RX ORDER — CIPROFLOXACIN 500 MG/1
500 TABLET, FILM COATED ORAL EVERY 12 HOURS SCHEDULED
Status: COMPLETED | OUTPATIENT
Start: 2022-11-24 | End: 2022-11-29

## 2022-11-24 RX ORDER — IBUPROFEN 400 MG/1
800 TABLET, FILM COATED ORAL EVERY 6 HOURS PRN
Status: DISCONTINUED | OUTPATIENT
Start: 2022-11-24 | End: 2022-11-24

## 2022-11-24 RX ORDER — IBUPROFEN 400 MG/1
800 TABLET, FILM COATED ORAL EVERY 8 HOURS
Status: DISCONTINUED | OUTPATIENT
Start: 2022-11-24 | End: 2022-11-24

## 2022-11-24 RX ORDER — LIDOCAINE 4 G/G
2 PATCH TOPICAL
Status: DISCONTINUED | OUTPATIENT
Start: 2022-11-24 | End: 2022-12-15 | Stop reason: HOSPADM

## 2022-11-24 RX ORDER — LANOLIN ALCOHOL/MO/W.PET/CERES
3 CREAM (GRAM) TOPICAL
Status: DISCONTINUED | OUTPATIENT
Start: 2022-11-24 | End: 2022-12-06

## 2022-11-24 RX ORDER — HYDROXYZINE HYDROCHLORIDE 25 MG/1
25 TABLET, FILM COATED ORAL EVERY 4 HOURS PRN
Status: DISCONTINUED | OUTPATIENT
Start: 2022-11-24 | End: 2022-12-15 | Stop reason: HOSPADM

## 2022-11-24 RX ORDER — NICOTINE 21 MG/24HR
1 PATCH, TRANSDERMAL 24 HOURS TRANSDERMAL DAILY
Status: COMPLETED | OUTPATIENT
Start: 2022-11-24 | End: 2022-12-01

## 2022-11-24 RX ORDER — POLYETHYLENE GLYCOL 3350 17 G/17G
17 POWDER, FOR SOLUTION ORAL DAILY PRN
Status: DISCONTINUED | OUTPATIENT
Start: 2022-11-24 | End: 2022-12-15 | Stop reason: HOSPADM

## 2022-11-24 RX ORDER — ACETAMINOPHEN 325 MG/1
650 TABLET ORAL EVERY 4 HOURS PRN
Status: DISCONTINUED | OUTPATIENT
Start: 2022-11-24 | End: 2022-12-15 | Stop reason: HOSPADM

## 2022-11-24 RX ORDER — IBUPROFEN 400 MG/1
800 TABLET, FILM COATED ORAL EVERY 8 HOURS PRN
Status: DISCONTINUED | OUTPATIENT
Start: 2022-11-24 | End: 2022-12-07

## 2022-11-24 RX ADMIN — CIPROFLOXACIN 500 MG: 500 TABLET, FILM COATED ORAL at 19:47

## 2022-11-24 RX ADMIN — HYDROXYZINE HYDROCHLORIDE 25 MG: 25 TABLET, FILM COATED ORAL at 02:29

## 2022-11-24 RX ADMIN — MELATONIN TAB 3 MG 3 MG: 3 TAB at 02:29

## 2022-11-24 RX ADMIN — NICOTINE 1 PATCH: 14 PATCH, EXTENDED RELEASE TRANSDERMAL at 08:55

## 2022-11-24 RX ADMIN — HYDROXYZINE HYDROCHLORIDE 25 MG: 25 TABLET, FILM COATED ORAL at 12:55

## 2022-11-24 RX ADMIN — NICOTINE POLACRILEX 2 MG: 2 GUM, CHEWING ORAL at 09:00

## 2022-11-24 RX ADMIN — OLANZAPINE 10 MG: 10 TABLET, FILM COATED ORAL at 09:33

## 2022-11-24 RX ADMIN — CIPROFLOXACIN 500 MG: 500 TABLET, FILM COATED ORAL at 08:56

## 2022-11-24 RX ADMIN — LIDOCAINE PATCH 4% 2 PATCH: 40 PATCH TOPICAL at 19:29

## 2022-11-24 RX ADMIN — HYDROXYZINE HYDROCHLORIDE 25 MG: 25 TABLET, FILM COATED ORAL at 19:47

## 2022-11-24 RX ADMIN — MELATONIN TAB 3 MG 3 MG: 3 TAB at 19:47

## 2022-11-24 SDOH — ECONOMIC STABILITY - HOUSING INSECURITY: HOMELESSNESS UNSPECIFIED: Z59.00

## 2022-11-24 ASSESSMENT — ACTIVITIES OF DAILY LIVING (ADL)
LAUNDRY: WITH SUPERVISION
ADLS_ACUITY_SCORE: 45
ADLS_ACUITY_SCORE: 41
ADLS_ACUITY_SCORE: 41
ADLS_ACUITY_SCORE: 61
ORAL_HYGIENE: INDEPENDENT
ADLS_ACUITY_SCORE: 45
ADLS_ACUITY_SCORE: 45
DRESS: SCRUBS (BEHAVIORAL HEALTH);INDEPENDENT
ADLS_ACUITY_SCORE: 45
ADLS_ACUITY_SCORE: 41
DRESS: INDEPENDENT;PROMPTS;SCRUBS (BEHAVIORAL HEALTH)
ORAL_HYGIENE: PROMPTS;INDEPENDENT
HYGIENE/GROOMING: PROMPTS;INDEPENDENT
ADLS_ACUITY_SCORE: 41
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
## 2022-11-24 NOTE — TELEPHONE ENCOUNTER
R: 7PM- Bed Search Update: [facility] is at capacity.  [facility] is posting 0 beds.   [facility] is posting 0 beds.   [facility] is posting 0 beds.   [facility] is posting 0 beds.   [facility] is posting 0 beds.    [facility] is posting 0 beds.     [facility] is posting 3 beds. Mixed unit 12+. Low acuity only.   [facility] posting 1 bed.  Low acuity only.   [facility] is positing 2 beds. No aggression.   [facility] is posting 0 beds.   [facility] is posting 1 beds. Low acuity only.  [facility] is posting 2 bed. Low aggression only.   [facility] is posting 1 beds. Low acuity.   [facility] is posting 2 beds. 72 hr hold required.  [facility] is posting 4 bed.  Low acuity only.      is posting 3 bed. Vol only, No Hx of aggression, violence or assault. No sexual offenders. No 72 hr holds.   [facility] is posting 6 beds. (Must have the cognitive ability to do programming. No aggressive or violent behavior or recent HX in the last 2 yrs. MH must be primary.)  [facility] is posting 0 beds. Low acuity only. Violence and aggression capped.   [facility] is posting 0 beds. Low acuity, Neg Covid.  [facility] posted 10 beds.  Low acuity only.  No violence or aggression.   [facility] is posting 0 bed. Covid neg. Vol only. Combined adolescent and adult unit. No aggressive or violent behavior. No registered sex offenders.   [facility] is posting 6 beds.  Unable to accept high acuity at this time.      Pt remains on waitlist pending available bed.

## 2022-11-24 NOTE — ED PROVIDER NOTES
This patient was signed over to me by my colleague pending placement in inpatient psychiatric facility.  Shortly after my shift we were informed a inpatient bed was available.  This patient is remained calm and cooperative and was transported by ground to [facility] for acute hospitalization.     Fernando Seo MD  11/24/22 0134

## 2022-11-24 NOTE — PROGRESS NOTES
"Pt was irritable upon speaking with MD team this morning, and yelled at them to get out of the room. Writer was on 1:1 during this time. Pt demanded that writer close the door. When writer did not close the door due to 1:1 status, pt yelled again stating \"close the f*cking door or I will kick it!\". Writer began to explain why they could not close the door, just when pt got up and charged at the door and writer. Writer immediately got up and told the pt that they would close the door, but also explained consequences of pt acting on aggression towards staff. Pt replied with \"Well it's not like I'm at a f*cking party.\" Pt laid back in bed, and door remained closed. Writer informed RN of event.   "

## 2022-11-25 PROCEDURE — 124N000002 HC R&B MH UMMC

## 2022-11-25 PROCEDURE — 250N000013 HC RX MED GY IP 250 OP 250 PS 637

## 2022-11-25 PROCEDURE — 250N000013 HC RX MED GY IP 250 OP 250 PS 637: Performed by: STUDENT IN AN ORGANIZED HEALTH CARE EDUCATION/TRAINING PROGRAM

## 2022-11-25 RX ADMIN — CIPROFLOXACIN 500 MG: 500 TABLET, FILM COATED ORAL at 08:51

## 2022-11-25 RX ADMIN — CIPROFLOXACIN 500 MG: 500 TABLET, FILM COATED ORAL at 20:11

## 2022-11-25 RX ADMIN — LIDOCAINE PATCH 4% 2 PATCH: 40 PATCH TOPICAL at 20:11

## 2022-11-25 RX ADMIN — OLANZAPINE 10 MG: 10 TABLET, FILM COATED ORAL at 09:52

## 2022-11-25 RX ADMIN — HYDROXYZINE HYDROCHLORIDE 25 MG: 25 TABLET, FILM COATED ORAL at 15:02

## 2022-11-25 RX ADMIN — NICOTINE 1 PATCH: 14 PATCH, EXTENDED RELEASE TRANSDERMAL at 08:51

## 2022-11-25 RX ADMIN — ACETAMINOPHEN 650 MG: 325 TABLET, FILM COATED ORAL at 15:02

## 2022-11-25 RX ADMIN — IBUPROFEN 800 MG: 400 TABLET, FILM COATED ORAL at 09:52

## 2022-11-25 ASSESSMENT — ACTIVITIES OF DAILY LIVING (ADL)
ADLS_ACUITY_SCORE: 41
ADLS_ACUITY_SCORE: 61
DRESS: SCRUBS (BEHAVIORAL HEALTH);INDEPENDENT
ADLS_ACUITY_SCORE: 61
HYGIENE/GROOMING: INDEPENDENT
DRESS: INDEPENDENT
ADLS_ACUITY_SCORE: 42
ADLS_ACUITY_SCORE: 41
ADLS_ACUITY_SCORE: 42
ORAL_HYGIENE: INDEPENDENT
ADLS_ACUITY_SCORE: 61
HYGIENE/GROOMING: INDEPENDENT
ADLS_ACUITY_SCORE: 42
ORAL_HYGIENE: INDEPENDENT
LAUNDRY: WITH SUPERVISION
ADLS_ACUITY_SCORE: 41
ADLS_ACUITY_SCORE: 61
ADLS_ACUITY_SCORE: 42
ADLS_ACUITY_SCORE: 61

## 2022-11-25 NOTE — PLAN OF CARE
"Problem: Psychotic Signs/Symptoms  Goal: Optimal Cognitive Function (Psychotic Signs/Symptoms)  Intervention: Support and Promote Cognitive Ability  Recent Flowsheet Documentation  Taken 11/24/2022 2100 by Alfredo Rothman RN  Trust Relationship/Rapport:   care explained   choices provided   emotional support provided   empathic listening provided   questions answered   questions encouraged   reassurance provided   thoughts/feelings acknowledged   Goal Outcome Evaluation:    The patient is on SIO 1:1 for self-injury risk and spent the entire evening in her room, either napping or just resting in bed. Her affect was blunted, and she expressed frustration about her hospitalization during check-in, telling this writer, \"I don't know why I am locked up here,\" and denied all mental health symptoms. She was medication compliant and ate dinner and snacks. She also took Hydroxyzine and Melatonin PRN at bedtime. The patient appeared to have poor concentration but was overall pleasant and cooperative.         "

## 2022-11-25 NOTE — PROGRESS NOTES
S: Pt. seen at [facility]. Female. Dr. Maxwell. RX: Right KO. DX: Right knee pain.  O:A: Today I F/D a Med Spec Gripper Hinged knee brace size Medium. Pt. happy with support. Donning doffing wear and care instructions given.  P: Pt. to be seen as needed.    Juan Miguel MCGINNIS,
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
## 2022-11-25 NOTE — PLAN OF CARE
11/25/22 1157   Individualization/Patient Specific Goals   Patient Personal Strengths resilient   Patient Vulnerabilities substance abuse/addiction;legal concerns;lacks insight into illness;poor physical health;housing insecurity;poor impulse control;food insecurity;limited support system   Anxieties, Fears or Concerns Wants to be discharged   Individualized Care Needs Will be encourated to attend groups and be medication compliant   Patient/Family-Specific Goals (Include Timeframe) None   Interprofessional Rounds   Summary Reason for admission and legal status   Participants CTC;nursing;psychiatrist;other (see comments)   Behavioral Team Discussion   Participants Dr. Barrow, Psychiatry Resident, Carlyn RN, Magy Galvez CTC   Progress Initial assessment   Anticipated length of stay No anticipated discharge date   Continued Stay Criteria/Rationale Admitted for SA, severe SIB, substance use. Court Hold. Needs clinical stabilization and medication mangement.   Medical/Physical Wound care   Precautions See below   Plan Psychiatry Team will meet with patient daily to assess psychiatric needs and to discuss medication options/side effects; during hospitalization patient will be encouraged to attend therapy groups and to participate in unit programming. CTC will coordinate disposition and aftercare plan   Rationale for change in precautions or plan None   Safety Plan See below   PRECAUTIONS AND SAFETY    Behavioral Orders   Procedures    Assault precautions    Code 1 - Restrict to Unit    Elopement precautions    Routine Programming     As clinically indicated    Self Injury Precaution    Status 15     Every 15 minutes.    Status Individual Observation     Patient SIO status reviewed with team/RN.  Please also refer to RN/team documentation for add'l detail.    -SIO staff to monitor following which have contributed to patient being on SIO:  Unsteadiness while ambulating, risk for falls  -Possible interventions SIO staff  could use to support patient's treatment progress:  Encourage safe ambulation, encourage patient use assistance when needed  -When following observed, team will review discontinuation of SIO:  Reduced fall risk     Order Specific Question:   CONTINUOUS 24 hours / day     Answer:   Other     Order Specific Question:   Specify distance     Answer:   10 feet     Order Specific Question:   Indications for SIO     Answer:   Self-injury risk    Suicide precautions     Patients on Suicide Precautions should have a Combination Diet ordered that includes a Diet selection(s) AND a Behavioral Tray selection for Safe Tray - with utensils, or Safe Tray - NO utensils         Safety  Safety WDL: WDL  Patient Location: patient room, own  Observed Behavior: sleeping  Safety Measures: 1:1 observation maintained, safety rounds completed  Suicidality: Status 15, SIO (Status Individual Observation)  (NOTE - order will specify distance  Assault: status 15, private room, status continuous sight  Elopement Interventions: status 15, status continuous sight       ____  I have reviewed this note. Lesa Barrow MD

## 2022-11-25 NOTE — PLAN OF CARE
Patient left her bed and decided to sleep on the floor, SIO staff in room to monitor for safety, she got up at 02:50 walked to bathroom and got in her bed at this time. Noted to be resting throughout this shift, she slept for 7  Hours, no concern for safety this shift, continue to monitor.  Problem: Suicidal Behavior  Goal: Suicidal Behavior is Absent or Managed  Outcome: Progressing     Problem: Sleep Disturbance  Goal: Adequate Sleep/Rest  Outcome: Progressing   Goal Outcome Evaluation:

## 2022-11-25 NOTE — PLAN OF CARE
"Isolative to room.  Declines to answer any mental health assessment questions, stating that those questions \"make her brain worse\".  Requested medications \"to turn my brain off\" stating \"I don't want to think about anything\".  Reports feeling agitated, mildly irritable, although less irritable than yesterday.  Medicated x 1 with PRN Zyprexa.   Reports right knee pain.  Requesting to change Lidoderm patches to be worn during the day, per pharmacy would need to miss tonight's dose to change to a day schedule.  Patient aware and will decide later if she is willing to miss one dose.  Medicated x 1 with Ibuprofen for right knee pain  Reports bilateral ear \"crackling\" with some noted yellowish drainage.  Denies ear pain    At 12:00 accepted a personal phone call.  Became increasingly angry and agitated with the phone call,  patient overheard saying \"Why are you talking to me like that\", then slammed the phone onto the phone .  Presided to yell and swear at SIO staff member stating \"shut the fucking door\". Presided to slam her door shut.  Writer attempted to talk with patient. Patient verbalized desire to be discharged, patient informed of court hold.  Patient yelled at writer stating \"if I can't fucking leave, I'm going to find a way to slit my wrists or neck\".  SIO continued for patient safety.    Given PRN tylenol and hydroxyzine at 1500.  Reports feeling anxious.  Resting in bed.  Has had no further verbal outbursts.     Problem: Pain Acute  Goal: Optimal Pain Control and Function  Outcome: Not Progressing     Problem: Sleep Disturbance  Goal: Adequate Sleep/Rest  Outcome: Progressing     Problem: Depression  Goal: Improved Mood  Outcome: Progressing     Problem: Anxiety  Goal: Anxiety Reduction or Resolution  Outcome: Progressing     Problem: Suicidal Behavior  Goal: Suicidal Behavior is Absent or Managed  Outcome: Progressing   Goal Outcome Evaluation:    Plan of Care Reviewed With: patient                   "

## 2022-11-26 PROCEDURE — 124N000002 HC R&B MH UMMC

## 2022-11-26 PROCEDURE — 250N000013 HC RX MED GY IP 250 OP 250 PS 637

## 2022-11-26 PROCEDURE — 250N000013 HC RX MED GY IP 250 OP 250 PS 637: Performed by: STUDENT IN AN ORGANIZED HEALTH CARE EDUCATION/TRAINING PROGRAM

## 2022-11-26 RX ADMIN — CIPROFLOXACIN 500 MG: 500 TABLET, FILM COATED ORAL at 19:55

## 2022-11-26 RX ADMIN — CIPROFLOXACIN 500 MG: 500 TABLET, FILM COATED ORAL at 08:51

## 2022-11-26 RX ADMIN — LIDOCAINE PATCH 4% 2 PATCH: 40 PATCH TOPICAL at 19:55

## 2022-11-26 RX ADMIN — IBUPROFEN 800 MG: 400 TABLET, FILM COATED ORAL at 20:42

## 2022-11-26 RX ADMIN — IBUPROFEN 800 MG: 400 TABLET, FILM COATED ORAL at 14:03

## 2022-11-26 RX ADMIN — HYDROXYZINE HYDROCHLORIDE 25 MG: 25 TABLET, FILM COATED ORAL at 05:56

## 2022-11-26 RX ADMIN — HYDROXYZINE HYDROCHLORIDE 25 MG: 25 TABLET, FILM COATED ORAL at 14:03

## 2022-11-26 RX ADMIN — LIDOCAINE PATCH 4% 2 PATCH: 40 PATCH TOPICAL at 06:55

## 2022-11-26 RX ADMIN — ACETAMINOPHEN 650 MG: 325 TABLET, FILM COATED ORAL at 23:06

## 2022-11-26 RX ADMIN — OLANZAPINE 10 MG: 10 TABLET, FILM COATED ORAL at 12:07

## 2022-11-26 RX ADMIN — IBUPROFEN 800 MG: 400 TABLET, FILM COATED ORAL at 05:56

## 2022-11-26 RX ADMIN — ACETAMINOPHEN 650 MG: 325 TABLET, FILM COATED ORAL at 08:59

## 2022-11-26 RX ADMIN — NICOTINE POLACRILEX 2 MG: 2 GUM, CHEWING ORAL at 23:06

## 2022-11-26 RX ADMIN — NICOTINE POLACRILEX 2 MG: 2 GUM, CHEWING ORAL at 10:59

## 2022-11-26 RX ADMIN — NICOTINE 1 PATCH: 14 PATCH, EXTENDED RELEASE TRANSDERMAL at 06:54

## 2022-11-26 RX ADMIN — NICOTINE POLACRILEX 2 MG: 2 GUM, CHEWING ORAL at 16:00

## 2022-11-26 RX ADMIN — HYDROXYZINE HYDROCHLORIDE 25 MG: 25 TABLET, FILM COATED ORAL at 20:11

## 2022-11-26 ASSESSMENT — ACTIVITIES OF DAILY LIVING (ADL)
DOING_ERRANDS_INDEPENDENTLY_DIFFICULTY: NO
FALL_HISTORY_WITHIN_LAST_SIX_MONTHS: YES
DIFFICULTY_EATING/SWALLOWING: NO
ADLS_ACUITY_SCORE: 29
DRESS: SCRUBS (BEHAVIORAL HEALTH)
ADLS_ACUITY_SCORE: 29
ADLS_ACUITY_SCORE: 42
WEAR_GLASSES_OR_BLIND: NO
DIFFICULTY_COMMUNICATING: NO
CHANGE_IN_FUNCTIONAL_STATUS_SINCE_ONSET_OF_CURRENT_ILLNESS/INJURY: NO
WALKING_OR_CLIMBING_STAIRS_DIFFICULTY: NO
TOILETING_ISSUES: NO
ADLS_ACUITY_SCORE: 42
DRESS: SCRUBS (BEHAVIORAL HEALTH);INDEPENDENT
ORAL_HYGIENE: INDEPENDENT
HYGIENE/GROOMING: INDEPENDENT
ORAL_HYGIENE: INDEPENDENT
ADLS_ACUITY_SCORE: 42
ADLS_ACUITY_SCORE: 29
DRESSING/BATHING_DIFFICULTY: NO
ADLS_ACUITY_SCORE: 42
CONCENTRATING,_REMEMBERING_OR_MAKING_DECISIONS_DIFFICULTY: NO
ADLS_ACUITY_SCORE: 42
ADLS_ACUITY_SCORE: 29
LAUNDRY: WITH SUPERVISION
ADLS_ACUITY_SCORE: 29
HYGIENE/GROOMING: INDEPENDENT
ADLS_ACUITY_SCORE: 42
ADLS_ACUITY_SCORE: 42
NUMBER_OF_TIMES_PATIENT_HAS_FALLEN_WITHIN_LAST_SIX_MONTHS: 1

## 2022-11-26 NOTE — PLAN OF CARE
Problem: Depression  Goal: Improved Mood  Outcome: Not Progressing  Intervention: Monitor and Manage Depressive Symptoms  Recent Flowsheet Documentation  Taken 11/25/2022 1627 by Wai Valdez, RN  Family/Support System Care:    presence promoted    involvement promoted     Problem: Anxiety  Goal: Anxiety Reduction or Resolution  Outcome: Not Progressing  Intervention: Promote Anxiety Reduction  Recent Flowsheet Documentation  Taken 11/25/2022 1627 by Wai Valdez RN  Family/Support System Care:    presence promoted    involvement promoted   Goal Outcome Evaluation:    Plan of Care Reviewed With: patient          Patient remains on SIO 1:1 for self-injury behavior. Stayed in her room the entire shift, napping intermittently. Affect  continue to be blunted, depressed, and flat. Mood is calm and psychotic outburst noted. Patient remains very polite, respectful, pleasant and cooperative. Vitals are normal(see flow sheet) and patient is alert and oriented x4. No PRNs given.Patient did not attend groups and ate dinner in room. Will continue to encourage and monitor.

## 2022-11-26 NOTE — PLAN OF CARE
Patient observed to be restless when sleeping, turning and tossing frequently in bed, at 04:30 she got up from bed and laid on the floor and continue to sleep, SIO staff at 10 feet to monitor for safety. At 0600 PRN Ibuprofen and Atarax was given per patient request for back and joints pain, patient also had pudding and yogurt for snack. Writer offered to put blankets on floor for patient to sleep on she declined, patient slept for 6.25 hrs. Patient requested for Nicotine patch and Lidocaine patches to be apply, pharm message sent to adjust time patches applied. Continue to monitor   Problem: Sleep Disturbance  Goal: Adequate Sleep/Rest  Outcome: Progressing   Goal Outcome Evaluation:

## 2022-11-26 NOTE — PLAN OF CARE
"Has been calm and cooperative throughout the day.  No verbal outbursts.  Did make a phone call to the friend that angered her yesterday without incident today.  Affect flat and blunted, brightens with social interactions. Has been out in the milieu, social with select peers.  Declined shower, did comb and style her hair. Prior to lunch, c/o increasing anxiety, requesting medication. States she was talking to her SIO about her past trauma's, Was tearful, restless and anxious. States \"I don't like talking about those things\".   Medicated x 1 with PRN Zyprexa.  Participated in afternoon unit game.  SIO continued for patient safety.   C/o right knee pain this morning, medicated with Tylenol x 1. Using right knee brace with weightbearing activities.  C/o anxiety and restlessness in the afternoon.  Given PRN Hydroxyzine.  Given PRN Ibuprofen for c/o  right knee pain.    Problem: Sleep Disturbance  Goal: Adequate Sleep/Rest  Outcome: Progressing     Problem: Depression  Goal: Improved Mood  Outcome: Progressing     Problem: Anxiety  Goal: Anxiety Reduction or Resolution  Outcome: Progressing     Problem: Suicidal Behavior  Goal: Suicidal Behavior is Absent or Managed  Outcome: Progressing     Problem: Pain Acute  Goal: Optimal Pain Control and Function  Outcome: Progressing     Problem: Psychotic Signs/Symptoms  Goal: Improved Behavioral Control (Psychotic Signs/Symptoms)  Outcome: Progressing  Goal: Optimal Cognitive Function (Psychotic Signs/Symptoms)  Outcome: Progressing  Intervention: Support and Promote Cognitive Ability  Recent Flowsheet Documentation  Taken 11/26/2022 1300 by Maria Luisa Norris RN  Trust Relationship/Rapport: questions answered  Goal: Increased Participation and Engagement (Psychotic Signs/Symptoms)  Outcome: Progressing  Goal: Improved Mood Symptoms  Outcome: Progressing  Goal: Improved Psychomotor Symptoms (Psychotic Signs/Symptoms)  Outcome: Progressing  Goal: Decreased Sensory Symptoms " "(Psychotic Signs/Symptoms)  Outcome: Progressing  Goal: Improved Sleep (Psychotic Signs/Symptoms)  Outcome: Progressing  Goal: Enhanced Social, Occupational or Functional Skills (Psychotic Signs/Symptoms)  Outcome: Progressing  Intervention: Promote Social, Occupational and Functional Ability  Recent Flowsheet Documentation  Taken 11/26/2022 1300 by Maria Luisa Norris RN  Trust Relationship/Rapport: questions answered     Problem: Adult Behavioral Health Plan of Care  Goal: Plan of Care Review  Outcome: Progressing  Flowsheets (Taken 11/26/2022 1300)  Patient Agreement with Plan of Care: agrees  Goal: Patient-Specific Goal (Individualization)  Description: You can add care plan individualizations to a care plan. Examples of Individualization might be:  \"Parent requests to be called daily at 9am for status\", \"I have a hard time hearing out of my right ear\", or \"Do not touch me to wake me up as it startles me\".  Outcome: Progressing  Goal: Individualized Daily Interaction Plan (IDIP)  Outcome: Progressing  Goal: Adheres to Safety Considerations for Self and Others  Outcome: Progressing  Goal: Absence of New-Onset Illness or Injury  Outcome: Progressing  Goal: Optimized Coping Skills in Response to Life Stressors  Outcome: Progressing  Goal: Develops/Participates in Therapeutic [place] to Support Successful Transition  Outcome: Progressing  Intervention: Foster Therapeutic [place]  Recent Flowsheet Documentation  Taken 11/26/2022 1300 by Maria Luisa Norris RN  Trust Relationship/Rapport: questions answered   Goal Outcome Evaluation:    Plan of Care Reviewed With: patient                   "

## 2022-11-26 NOTE — PROGRESS NOTES
11/26/22 1501   Group Therapy Session   Group Attendance attended group session   Time Session Began 1315   Time Session Ended 1400   Total Time (minutes) 20   Total # Attendees 3   Group Type task skill   Group Session Detail Analisa visual perceptual activity, and Maria Elena for visual perceptual skills, organization/planning, critical thinking, computation, tracking, forming strategy, simple problem solving, coping, relaxation, concentration, attention to detail, follow through, frustration tolerance, healthy leisure, mood stabilization, reality based activity and social skill expansion.   Patient Participation Detail Pt presented with some confusion.  She stated her name for therapist, but then kept second guessing how it is spelled.  Therapist reassured her and looked at her wrist band.  It was her nick name that she was struggling to spell.  Pt was able to participate in the visual perceptual activity without issue, though her attention span was short.  Pt was in and out of group a few times.  Pt left group early as she appeared to tire of waiting for peers to solve their puzzles prior to the group moving on to the next pattern.  Pt's 1:1 was with her.  No charge.

## 2022-11-26 NOTE — PROVIDER NOTIFICATION
11/26/22 1400   Patient Belongings   Did you bring any home meds/supplements to the hospital?  No   Patient Belongings locker   Patient Belongings Put in Hospital Secure Location (Security or Locker, etc.) clothing   Belongings Search Yes   Clothing Search Yes   Second Staff BA   LOCKER    Black bra  Black tank top  Long sleeve shirt  Navy hooded sweatshirt  Fleece blanket    UPDATE 12-05-22:  brought pair of black shoes, 1 pant, 2 pair of socks, 2 t-shirts and 1 underwear.    A               Admission:  I am responsible for any personal items that are not sent to the safe or pharmacy.  [facility] is not responsible for loss, theft or damage of any property in my possession.    Signature:  _________________________________ Date: _______  Time: _____                                              Staff Signature:  ____________________________ Date: ________  Time: _____      2nd Staff person, if patient is unable/unwilling to sign:    Signature: ________________________________ Date: ________  Time: _____     Discharge:  [facility] has returned all of my personal belongings:    Signature: _________________________________ Date: ________  Time: _____                                          Staff Signature:  ____________________________ Date: ________  Time: _____

## 2022-11-27 PROCEDURE — 250N000013 HC RX MED GY IP 250 OP 250 PS 637: Performed by: STUDENT IN AN ORGANIZED HEALTH CARE EDUCATION/TRAINING PROGRAM

## 2022-11-27 PROCEDURE — 250N000013 HC RX MED GY IP 250 OP 250 PS 637

## 2022-11-27 PROCEDURE — 124N000002 HC R&B MH UMMC

## 2022-11-27 RX ADMIN — NICOTINE POLACRILEX 2 MG: 2 GUM, CHEWING ORAL at 13:16

## 2022-11-27 RX ADMIN — HYDROXYZINE HYDROCHLORIDE 25 MG: 25 TABLET, FILM COATED ORAL at 05:01

## 2022-11-27 RX ADMIN — LIDOCAINE PATCH 4% 2 PATCH: 40 PATCH TOPICAL at 09:51

## 2022-11-27 RX ADMIN — HYDROXYZINE HYDROCHLORIDE 25 MG: 25 TABLET, FILM COATED ORAL at 13:47

## 2022-11-27 RX ADMIN — CIPROFLOXACIN 500 MG: 500 TABLET, FILM COATED ORAL at 08:36

## 2022-11-27 RX ADMIN — NICOTINE 1 PATCH: 14 PATCH, EXTENDED RELEASE TRANSDERMAL at 09:51

## 2022-11-27 RX ADMIN — IBUPROFEN 800 MG: 400 TABLET, FILM COATED ORAL at 13:46

## 2022-11-27 RX ADMIN — OLANZAPINE 10 MG: 10 TABLET, FILM COATED ORAL at 17:42

## 2022-11-27 RX ADMIN — IBUPROFEN 800 MG: 400 TABLET, FILM COATED ORAL at 04:59

## 2022-11-27 RX ADMIN — NICOTINE POLACRILEX 2 MG: 2 GUM, CHEWING ORAL at 10:02

## 2022-11-27 RX ADMIN — CIPROFLOXACIN 500 MG: 500 TABLET, FILM COATED ORAL at 21:29

## 2022-11-27 ASSESSMENT — ACTIVITIES OF DAILY LIVING (ADL)
ADLS_ACUITY_SCORE: 29
ADLS_ACUITY_SCORE: 29
ORAL_HYGIENE: INDEPENDENT
ADLS_ACUITY_SCORE: 29
ORAL_HYGIENE: INDEPENDENT
HYGIENE/GROOMING: SHOWER;INDEPENDENT
ADLS_ACUITY_SCORE: 29
DRESS: SCRUBS (BEHAVIORAL HEALTH);INDEPENDENT
ADLS_ACUITY_SCORE: 29
DRESS: SCRUBS (BEHAVIORAL HEALTH);INDEPENDENT
HYGIENE/GROOMING: INDEPENDENT

## 2022-11-27 NOTE — PLAN OF CARE
"  Problem: Anxiety  Goal: Anxiety Reduction or Resolution  Outcome: Not Progressing  Intervention: Promote Anxiety Reduction  Recent Flowsheet Documentation  Taken 11/27/2022 1600 by Wai Valdez RN  Complementary Therapy: essential oils utilized  Family/Support System Care:    presence promoted    self-care encouraged   Goal Outcome Evaluation:    Plan of Care Reviewed With: patient             Pt.' blood pressure has been progressively high(see flow sheet). Patient complaining of dizziness and numbness to both arms. Is unstable during ambulation or standing.  Rates anxiety @ 5, but denies SI, HI, and SIB. Appears disoriented and confused. Is alert and oriented x2.  Hospitalist notified.   Zyprexa  administered and pending results. Will continue to monitor.    2205: Patient stated \"I feel better. My anxiety levels have reduced, I want to continue sleeping.\" Refused blood pressure recheck.      "

## 2022-11-27 NOTE — PLAN OF CARE
Problem: Adult Behavioral Health Plan of Care  Goal: Plan of Care Review  Outcome: Progressing  Flowsheets (Taken 11/26/2022 1615)  Patient Agreement with Plan of Care: agrees   Goal Outcome Evaluation:    Plan of Care Reviewed With: patient      Patient was more friendly and engage for the most part. She was visible in the milieu and interacted appropriately with peer and staff. Mood/affect was pleasant, bright, and calm. Mentation was clear and organized, and patient was alert and oriented x4. She accompanied peers for dinner in the dinning area and socialized without incident.    She  remained on SIO 1:1 for possible self-injury behavior.  BP was somewhat elevated at the beginning of shift, but was normal on recheck(see flow sheet). Ibuprofen and atarax administered per patient's request for generalized body pain  @5  and anxiety @ 6 towards end of shift and appeared to be effective. Will continue to monitor and encourage.

## 2022-11-27 NOTE — PLAN OF CARE
Patient is sleeping between cares, SIO staff at 10 ft for safety monitoring, patient slept for 6.5 hours, requested for and took PRN Ibuprofen and Atarax at 0500 for generalized pain and anxiety with relief. No concern for safety this shift.        Goal Outcome Evaluation:  Problem: Sleep Disturbance  Goal: Adequate Sleep/Rest  Outcome: Progressing

## 2022-11-27 NOTE — PLAN OF CARE
Showered this morning.  Has come out of her room for meals, otherwise stays mostly in her room.  Has been calm ,cooperative and polite.  Social with peers when in the milieu. Denies suicidal thoughts and thoughts of self harm.  Reports that the self harm she did prior to admission was done out of anger to release pain and not an attempt to end her life.  Currently denies all mental adelita symptoms. SIO continued for patient safety due to impulsive and severity of previous self harm.   Lacerations on left arm redressed.  Has small amount of serosanguinous drainage. C/o itching along suture lines.  Sutures intact.  C/o some discomfort near a scabbed area on one of the lacerations.  Medicated x 1 for right knee pain with PRN Ibuprofen. Also given hydroxyzine x 1 for mild anxiety and wound itching.      Problem: Sleep Disturbance  Goal: Adequate Sleep/Rest  Outcome: Progressing     Problem: Depression  Goal: Improved Mood  Outcome: Progressing     Problem: Anxiety  Goal: Anxiety Reduction or Resolution  Outcome: Progressing     Problem: Suicidal Behavior  Goal: Suicidal Behavior is Absent or Managed  Outcome: Progressing     Problem: Pain Acute  Goal: Optimal Pain Control and Function  Outcome: Progressing     Problem: Psychotic Signs/Symptoms  Goal: Improved Behavioral Control (Psychotic Signs/Symptoms)  Outcome: Progressing  Goal: Optimal Cognitive Function (Psychotic Signs/Symptoms)  Outcome: Progressing  Intervention: Support and Promote Cognitive Ability  Recent Flowsheet Documentation  Taken 11/27/2022 1252 by Maria Luisa Norris RN  Trust Relationship/Rapport: questions answered  Goal: Increased Participation and Engagement (Psychotic Signs/Symptoms)  Outcome: Progressing  Goal: Improved Mood Symptoms  Outcome: Progressing  Goal: Improved Psychomotor Symptoms (Psychotic Signs/Symptoms)  Outcome: Progressing  Goal: Decreased Sensory Symptoms (Psychotic Signs/Symptoms)  Outcome: Progressing  Goal: Improved Sleep  "(Psychotic Signs/Symptoms)  Outcome: Progressing  Goal: Enhanced Social, Occupational or Functional Skills (Psychotic Signs/Symptoms)  Outcome: Progressing  Intervention: Promote Social, Occupational and Functional Ability  Recent Flowsheet Documentation  Taken 11/27/2022 1252 by Maria Luisa Norris RN  Trust Relationship/Rapport: questions answered     Problem: Adult Behavioral Health Plan of Care  Goal: Plan of Care Review  Outcome: Progressing  Flowsheets (Taken 11/27/2022 1252)  Patient Agreement with Plan of Care: agrees  Goal: Patient-Specific Goal (Individualization)  Description: You can add care plan individualizations to a care plan. Examples of Individualization might be:  \"Parent requests to be called daily at 9am for status\", \"I have a hard time hearing out of my right ear\", or \"Do not touch me to wake me up as it startles me\".  Outcome: Progressing  Goal: Individualized Daily Interaction Plan (IDIP)  Outcome: Progressing  Goal: Adheres to Safety Considerations for Self and Others  Outcome: Progressing  Goal: Absence of New-Onset Illness or Injury  Outcome: Progressing  Goal: Optimized Coping Skills in Response to Life Stressors  Outcome: Progressing  Goal: Develops/Participates in Therapeutic [place] to Support Successful Transition  Outcome: Progressing  Intervention: Foster Therapeutic [place]  Recent Flowsheet Documentation  Taken 11/27/2022 1252 by Maria Luisa Norris RN  Trust Relationship/Rapport: questions answered   Goal Outcome Evaluation:    Plan of Care Reviewed With: patient                   "

## 2022-11-28 PROCEDURE — 250N000013 HC RX MED GY IP 250 OP 250 PS 637

## 2022-11-28 PROCEDURE — 99232 SBSQ HOSP IP/OBS MODERATE 35: CPT | Mod: GC | Performed by: PSYCHIATRY & NEUROLOGY

## 2022-11-28 PROCEDURE — 250N000013 HC RX MED GY IP 250 OP 250 PS 637: Performed by: STUDENT IN AN ORGANIZED HEALTH CARE EDUCATION/TRAINING PROGRAM

## 2022-11-28 PROCEDURE — 124N000002 HC R&B MH UMMC

## 2022-11-28 PROCEDURE — G0177 OPPS/PHP; TRAIN & EDUC SERV: HCPCS

## 2022-11-28 RX ORDER — PRAZOSIN HYDROCHLORIDE 1 MG/1
1 CAPSULE ORAL AT BEDTIME
Status: DISCONTINUED | OUTPATIENT
Start: 2022-11-28 | End: 2022-12-09

## 2022-11-28 RX ORDER — ALBUTEROL SULFATE 90 UG/1
2 AEROSOL, METERED RESPIRATORY (INHALATION) EVERY 6 HOURS PRN
Status: DISCONTINUED | OUTPATIENT
Start: 2022-11-28 | End: 2022-12-15 | Stop reason: HOSPADM

## 2022-11-28 RX ORDER — GABAPENTIN 300 MG/1
300 CAPSULE ORAL 3 TIMES DAILY
Status: DISCONTINUED | OUTPATIENT
Start: 2022-11-28 | End: 2022-12-15 | Stop reason: HOSPADM

## 2022-11-28 RX ADMIN — IBUPROFEN 800 MG: 400 TABLET, FILM COATED ORAL at 20:59

## 2022-11-28 RX ADMIN — LIDOCAINE PATCH 4% 2 PATCH: 40 PATCH TOPICAL at 08:00

## 2022-11-28 RX ADMIN — PRAZOSIN HYDROCHLORIDE 1 MG: 1 CAPSULE ORAL at 21:00

## 2022-11-28 RX ADMIN — NICOTINE POLACRILEX 2 MG: 2 GUM, CHEWING ORAL at 14:33

## 2022-11-28 RX ADMIN — NICOTINE POLACRILEX 2 MG: 2 GUM, CHEWING ORAL at 11:20

## 2022-11-28 RX ADMIN — NICOTINE 1 PATCH: 14 PATCH, EXTENDED RELEASE TRANSDERMAL at 07:59

## 2022-11-28 RX ADMIN — OLANZAPINE 10 MG: 10 TABLET, FILM COATED ORAL at 11:20

## 2022-11-28 RX ADMIN — IBUPROFEN 800 MG: 400 TABLET, FILM COATED ORAL at 07:59

## 2022-11-28 RX ADMIN — NICOTINE POLACRILEX 2 MG: 2 GUM, CHEWING ORAL at 08:07

## 2022-11-28 RX ADMIN — GABAPENTIN 300 MG: 300 CAPSULE ORAL at 14:13

## 2022-11-28 RX ADMIN — ALBUTEROL SULFATE 2 PUFF: 90 AEROSOL, METERED RESPIRATORY (INHALATION) at 13:50

## 2022-11-28 RX ADMIN — GABAPENTIN 300 MG: 300 CAPSULE ORAL at 20:21

## 2022-11-28 RX ADMIN — HYDROXYZINE HYDROCHLORIDE 25 MG: 25 TABLET, FILM COATED ORAL at 07:59

## 2022-11-28 RX ADMIN — CIPROFLOXACIN 500 MG: 500 TABLET, FILM COATED ORAL at 20:21

## 2022-11-28 RX ADMIN — ACETAMINOPHEN 650 MG: 325 TABLET, FILM COATED ORAL at 11:20

## 2022-11-28 RX ADMIN — CIPROFLOXACIN 500 MG: 500 TABLET, FILM COATED ORAL at 07:59

## 2022-11-28 ASSESSMENT — ACTIVITIES OF DAILY LIVING (ADL)
ADLS_ACUITY_SCORE: 29
ORAL_HYGIENE: INDEPENDENT
ADLS_ACUITY_SCORE: 29
DRESS: INDEPENDENT
ADLS_ACUITY_SCORE: 29
HYGIENE/GROOMING: INDEPENDENT

## 2022-11-28 NOTE — PLAN OF CARE
Patient appeared to be resting most of the night, continue safety monitoring with SIO staff, patient up to bathroom independently, able to make needs known, she slept for 6 hrs, no request for PRN medication or snack this shift. Continue plan of care.  Problem: Sleep Disturbance  Goal: Adequate Sleep/Rest  Outcome: Progressing   Goal Outcome Evaluation:

## 2022-11-28 NOTE — PLAN OF CARE
"  Problem: Depression  Goal: Improved Mood  Outcome: Not Progressing     Problem: Anxiety  Goal: Anxiety Reduction or Resolution  Outcome: Not Progressing   Goal Outcome Evaluation             Patient spent the evening in room, lying on the floor, isolated, and unengaged. On assessment, patient stated  \"My whole body hurts, my anxiety is high, and I don't feel like waking up. My body is burning.I still feel dizzy and tired. Just want to sleep.\"  BP remains high -(BP (!) 146/92   Pulse 108   Temp 98.5  F (36.9  C) (Oral)   Resp 16   Wt 62.8 kg (138 lb 8 oz)   LMP  (LMP Unknown)   SpO2 98% . Denied SI, HI, SIB, and other psych related symptoms. Ibuprofen, given @ HS for generalized body and knee pain and appeared to be effective. Will continue to monitor and encourage.         "

## 2022-11-28 NOTE — PLAN OF CARE
Patient alert and able to make needs known, up independently in room and craft this shift, dressing to left inner arm laceration changed this shift. Patient endorses anxiety and SI with no active plan, SIO staff continue safety monitoring until 13:00, SIO was discontinued after teams round. Writer reassessed patient and she confirmed that she will talk to staff if she felt the urge or thoughts to hurt herself, patient also informed staff that she usually will come out of her room when she starts to feel overwhelm, patient was contracted for safety.  PRN Hydroxyzine and Ibuprofen given at 0800 and at 11:20, PRN Zyprexa and Tylenol given.  Patient requested to cut her hair before braiding, staff discourged cutting of hair and patient stated understanding. Has adequately intake of food and fluids, attended afternoon group this shift, continue to monitor.    Problem: Depression  Goal: Improved Mood  Outcome: Progressing     Problem: Anxiety  Goal: Anxiety Reduction or Resolution  Outcome: Progressing     Problem: Suicidal Behavior  Goal: Suicidal Behavior is Absent or Managed  Outcome: Progressing   Goal Outcome Evaluation:
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
## 2022-11-28 NOTE — PLAN OF CARE
[facility] Occupational Therapy Group Intervention Note     11/28/22 1401   Group Therapy Session   Group Attendance attended group session   Time Session Began 1115   Time Session Ended 1200   Total Time (minutes) 45   Total # Attendees 6   Group Type task skill;life skill   Group Topic Covered coping skills/lifestyle management;relapse prevention;structured socialization   Group Session Detail clinic - coping skill exploration, creative expression within personally meaningful activities, and observation of social, cognitive, and kinesthetic performance skills   Patient Response/Contribution cooperative with task   Patient Participation Detail Calm and content. SIO present. Attended for full duration of group. Attentive to project for ~20 minutes prior to switching to a new project. Unable to reach task completion, nevertheless, demonstrated increased sustained attention within simple tasks.      Masha Mcgill OT on 11/28/2022 at 2:03 PM

## 2022-11-28 NOTE — ED NOTES
Triage and Transition - Consult and Liaison     Jody Barrow  November 28, 2022    This writer received a call from [facility]'s office requesting for contact information on patient and current location. Court at 10:30 AM.     Information for station [facility] was provided to the caller.    RADHA Lucio, [facility]  Triage and Transition - Consult and Liaison   [phone]

## 2022-11-29 PROCEDURE — 124N000002 HC R&B MH UMMC

## 2022-11-29 PROCEDURE — 250N000013 HC RX MED GY IP 250 OP 250 PS 637: Performed by: STUDENT IN AN ORGANIZED HEALTH CARE EDUCATION/TRAINING PROGRAM

## 2022-11-29 PROCEDURE — 99232 SBSQ HOSP IP/OBS MODERATE 35: CPT | Mod: GC | Performed by: PSYCHIATRY & NEUROLOGY

## 2022-11-29 PROCEDURE — 250N000013 HC RX MED GY IP 250 OP 250 PS 637

## 2022-11-29 PROCEDURE — G0177 OPPS/PHP; TRAIN & EDUC SERV: HCPCS

## 2022-11-29 RX ADMIN — NICOTINE POLACRILEX 2 MG: 2 GUM, CHEWING ORAL at 06:00

## 2022-11-29 RX ADMIN — HYDROXYZINE HYDROCHLORIDE 25 MG: 25 TABLET, FILM COATED ORAL at 07:58

## 2022-11-29 RX ADMIN — DICLOFENAC SODIUM 2 G: 10 GEL TOPICAL at 17:56

## 2022-11-29 RX ADMIN — NICOTINE POLACRILEX 2 MG: 2 GUM, CHEWING ORAL at 13:00

## 2022-11-29 RX ADMIN — IBUPROFEN 800 MG: 400 TABLET, FILM COATED ORAL at 17:31

## 2022-11-29 RX ADMIN — GABAPENTIN 300 MG: 300 CAPSULE ORAL at 13:01

## 2022-11-29 RX ADMIN — NICOTINE POLACRILEX 2 MG: 2 GUM, CHEWING ORAL at 07:58

## 2022-11-29 RX ADMIN — GABAPENTIN 300 MG: 300 CAPSULE ORAL at 07:58

## 2022-11-29 RX ADMIN — PRAZOSIN HYDROCHLORIDE 1 MG: 1 CAPSULE ORAL at 21:21

## 2022-11-29 RX ADMIN — LIDOCAINE PATCH 4% 2 PATCH: 40 PATCH TOPICAL at 10:49

## 2022-11-29 RX ADMIN — CIPROFLOXACIN 500 MG: 500 TABLET, FILM COATED ORAL at 07:58

## 2022-11-29 RX ADMIN — CIPROFLOXACIN 500 MG: 500 TABLET, FILM COATED ORAL at 19:03

## 2022-11-29 RX ADMIN — NICOTINE POLACRILEX 2 MG: 2 GUM, CHEWING ORAL at 19:05

## 2022-11-29 RX ADMIN — IBUPROFEN 800 MG: 400 TABLET, FILM COATED ORAL at 05:56

## 2022-11-29 RX ADMIN — NICOTINE 1 PATCH: 14 PATCH, EXTENDED RELEASE TRANSDERMAL at 07:58

## 2022-11-29 RX ADMIN — ACETAMINOPHEN 650 MG: 325 TABLET, FILM COATED ORAL at 17:31

## 2022-11-29 RX ADMIN — GABAPENTIN 300 MG: 300 CAPSULE ORAL at 19:03

## 2022-11-29 ASSESSMENT — ACTIVITIES OF DAILY LIVING (ADL)
ADLS_ACUITY_SCORE: 29
HYGIENE/GROOMING: INDEPENDENT
ADLS_ACUITY_SCORE: 29
DRESS: INDEPENDENT
ADLS_ACUITY_SCORE: 29
ORAL_HYGIENE: INDEPENDENT
ADLS_ACUITY_SCORE: 29
LAUNDRY: WITH SUPERVISION
ADLS_ACUITY_SCORE: 29
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
## 2022-11-29 NOTE — PLAN OF CARE
[facility] Occupational Therapy Group Intervention Note     11/29/22 1144   Group Therapy Session   Group Attendance attended group session   Time Session Began 1015   Time Session Ended 1100   Total Time (minutes) 45   Total # Attendees 6   Group Type task skill   Group Topic Covered cognitive activities;leisure exploration/use of leisure time;structured socialization   Group Session Detail Multi-step creative expressions craft for increased motivation, coping with symptoms, socialization, and clinical observation.    Patient Response/Contribution cooperative with task;organized   Patient Participation Detail I to initiate, make decisions, sequence and adjust to workspace demands as needed. Utilized verbal and visual instructions for direction on an origami task, however eventually abandoned project d/t complexity demonstrating difficulty with focus, planning, frustration tolerance, and problem solving. Regardless, initiated a more simplified craft which she was motivated to complete with peers; social and pleasant throughout group.        Masha Mcgill OT on 11/29/2022 at 11:45 AM

## 2022-11-29 NOTE — PLAN OF CARE
Patient out and visible on Unit most of this shift, interacting with peers appropriately, she reported feeling anxious at the start of shift due to having lots of people in the same space, PRN Atarax given once and Nicotine x 2. Eating and drinking adequately, appearance clean and well kept and is compliant with medication regimen. Patient agreeable with plan of care and was moved into room 207 bed 2. Patient participating in group activities and working on art and coloring in dinning area. Patient denies SI, SIB, HI and is barry for safety, continue plan of care.  Problem: Depression  Goal: Improved Mood  Outcome: Progressing     Problem: Anxiety  Goal: Anxiety Reduction or Resolution  Outcome: Progressing     Problem: Suicidal Behavior  Goal: Suicidal Behavior is Absent or Managed  Outcome: Progressing   Goal Outcome Evaluation:

## 2022-11-29 NOTE — PLAN OF CARE
Pt has been visible in milieu and social with her roommate. She was briefly tearful during 1:1 discussing all the loss and accompanying grief she has experienced, and looks forward to grief counseling. She was excited to learn that hospital  sets up many post discharge appointments.  She admits that she has trouble opening up and trusting others, but is help seeking. She denied SI and MH sx, and said she plans to stay in [place]. She is also open to antidepressants. She talked in general about how hard her life has been since move to [place] from [place]. She complained of knee pain and was given some education about analgesics available. L arm sutures cleaned with microklenz, then bacitracin and recovered with gauze and dressing this shift- CDI WNL. She complained of anxiety relieved somewhat by Rx. Minor HTN and some tachycardia noted. Will continue to monitor and encourage participation.    Problem: Suicidal Behavior  Goal: Suicidal Behavior is Absent or Managed  Outcome: Progressing  Flowsheets (Taken 11/29/2022 1745)  Mutually Determined Action Steps (Suicidal Behavior Absent/Managed): sets future-oriented goal     Problem: Pain Acute  Goal: Optimal Pain Control and Function  Outcome: Progressing     Problem: Psychotic Signs/Symptoms  Goal: Increased Participation and Engagement (Psychotic Signs/Symptoms)  Outcome: Progressing  Flowsheets (Taken 11/29/2022 1745)  Mutually Determined Action Steps (Increased Participation and Engagement): verbalizes personal treatment goal  Goal: Improved Mood Symptoms  Outcome: Progressing  Flowsheets (Taken 11/29/2022 1745)  Mutually Determined Action Steps (Improved Mood Symptoms):    verbalizes increased insight    acknowledges progress    identifies personal treatment goal   Goal Outcome Evaluation:

## 2022-11-29 NOTE — PLAN OF CARE
Patient was sleeping until 0445. Patient no complains of pain and discomfort so far. Patient transfer independently, ambulating without any assistive device. Patient complained of R knee pain- Ibuprofen given, knee brace intact and in place. Patient L inner arm Telfa dressing CDI. Patient denied SI,SIB,AH/VH- contracted for safety. Patient paced the hallway. Patient appears calm, no behavioral issues noted. Patient had 5.5 hours of sleep this shift. Will continue with current plan of care.    Problem: Sleep Disturbance  Goal: Adequate Sleep/Rest  Outcome: Progressing

## 2022-11-29 NOTE — PLAN OF CARE
Assessment/Intervention/Current Symtoms and Care Coordination  The patient's care was discussed with the treatment team and chart notes were reviewed.   Patient met with team.  She denies any further thoughts to harm self. States that she is feeling a little better. Patient has attended some groups- was isolative to room last night    Discharge Plan or Goal  Pending court resolution. Patient would benefit from residential CD treatment  Psychiatric f/u care in place  Case management services     Barriers to Discharge   S/P significant self injury  Polsubstance use  Homeless  Civil commitment in process     Referral Status  None today     Legal Status     Court Hold- [facility]  Preliminary Hearing 11/28/22 at 10:00am  Trial: 12/5/22 at 9:30am

## 2022-11-30 PROCEDURE — 124N000002 HC R&B MH UMMC

## 2022-11-30 PROCEDURE — 250N000013 HC RX MED GY IP 250 OP 250 PS 637: Performed by: STUDENT IN AN ORGANIZED HEALTH CARE EDUCATION/TRAINING PROGRAM

## 2022-11-30 PROCEDURE — 99232 SBSQ HOSP IP/OBS MODERATE 35: CPT | Mod: GC | Performed by: PSYCHIATRY & NEUROLOGY

## 2022-11-30 PROCEDURE — 250N000013 HC RX MED GY IP 250 OP 250 PS 637

## 2022-11-30 PROCEDURE — H2032 ACTIVITY THERAPY, PER 15 MIN: HCPCS

## 2022-11-30 PROCEDURE — G0177 OPPS/PHP; TRAIN & EDUC SERV: HCPCS

## 2022-11-30 RX ORDER — DIPHENHYDRAMINE HCL 25 MG
25 CAPSULE ORAL EVERY 6 HOURS PRN
Status: DISCONTINUED | OUTPATIENT
Start: 2022-11-30 | End: 2022-12-15 | Stop reason: HOSPADM

## 2022-11-30 RX ORDER — TRAZODONE HYDROCHLORIDE 50 MG/1
50 TABLET, FILM COATED ORAL
Status: DISCONTINUED | OUTPATIENT
Start: 2022-11-30 | End: 2022-12-15 | Stop reason: HOSPADM

## 2022-11-30 RX ORDER — POLYETHYLENE GLYCOL 3350 17 G
4 POWDER IN PACKET (EA) ORAL
Status: DISCONTINUED | OUTPATIENT
Start: 2022-12-01 | End: 2022-12-15 | Stop reason: HOSPADM

## 2022-11-30 RX ADMIN — LIDOCAINE PATCH 4% 2 PATCH: 40 PATCH TOPICAL at 08:10

## 2022-11-30 RX ADMIN — GABAPENTIN 300 MG: 300 CAPSULE ORAL at 14:23

## 2022-11-30 RX ADMIN — GABAPENTIN 300 MG: 300 CAPSULE ORAL at 08:10

## 2022-11-30 RX ADMIN — PRAZOSIN HYDROCHLORIDE 1 MG: 1 CAPSULE ORAL at 19:11

## 2022-11-30 RX ADMIN — HYDROXYZINE HYDROCHLORIDE 25 MG: 25 TABLET, FILM COATED ORAL at 16:27

## 2022-11-30 RX ADMIN — NICOTINE POLACRILEX 2 MG: 2 GUM, CHEWING ORAL at 14:23

## 2022-11-30 RX ADMIN — NICOTINE POLACRILEX 2 MG: 2 GUM, CHEWING ORAL at 08:10

## 2022-11-30 RX ADMIN — GABAPENTIN 300 MG: 300 CAPSULE ORAL at 19:11

## 2022-11-30 RX ADMIN — IBUPROFEN 800 MG: 400 TABLET, FILM COATED ORAL at 09:08

## 2022-11-30 RX ADMIN — NICOTINE 1 PATCH: 14 PATCH, EXTENDED RELEASE TRANSDERMAL at 09:15

## 2022-11-30 RX ADMIN — NICOTINE POLACRILEX 2 MG: 2 GUM, CHEWING ORAL at 16:27

## 2022-11-30 RX ADMIN — TRAZODONE HYDROCHLORIDE 50 MG: 50 TABLET ORAL at 19:11

## 2022-11-30 RX ADMIN — DIPHENHYDRAMINE HYDROCHLORIDE 25 MG: 25 CAPSULE ORAL at 22:09

## 2022-11-30 ASSESSMENT — ACTIVITIES OF DAILY LIVING (ADL)
ADLS_ACUITY_SCORE: 29
LAUNDRY: WITH SUPERVISION
ADLS_ACUITY_SCORE: 29
ORAL_HYGIENE: INDEPENDENT
ADLS_ACUITY_SCORE: 29
DRESS: INDEPENDENT
HYGIENE/GROOMING: INDEPENDENT
ADLS_ACUITY_SCORE: 29
ADLS_ACUITY_SCORE: 29
HYGIENE/GROOMING: INDEPENDENT
DRESS: INDEPENDENT
ADLS_ACUITY_SCORE: 29
ORAL_HYGIENE: INDEPENDENT
ADLS_ACUITY_SCORE: 29

## 2022-11-30 NOTE — PROGRESS NOTES
Pt participated in dance/movement therapy (D/MT) exploring themes of connection, community, belonging and inclusion. Some practice in expressing and asserting boundaries or noticing and respecting the boundaries of others was included. Pt was active in inviting peers to join and led many nonverbal group psychotherapeutic processes as well.    Expressions ranged in energy level from vitalizing to settling, to practice co-regulation with peers and pt was most engaged in higher energy engagement.  She struggled more with physically settling or calming and at these times, tended to move out and then back into the group room.       11/30/22 1015   Expressive Therapy   Therapy Type dance/movement   Minutes of Treatment 45

## 2022-11-30 NOTE — PLAN OF CARE
Pt denies SI.  Pt very social with peers.  Pt attended most of groups.  Pt eating well.  Pt c/o of rt knee pain pt utiling knee brace, wearing Lidoderm patches, and PRN Ibuprofen with relief.  Pt room is very neat.  Pt wanting lidocane patches to be placed just so.  Pt asked why pt cut her arm.  Pt replied that she was tired of everyonee around her dying.  Dressing changed on rt forearm.  Stitches intact.  No s/sx's of infection, no bleeding/drainage.  Areas cleaned and new dressing applied.  Problem: Suicidal Behavior  Goal: Suicidal Behavior is Absent or Managed  Outcome: Not Progressing     Problem: Psychotic Signs/Symptoms  Goal: Improved Behavioral Control (Psychotic Signs/Symptoms)  Outcome: Not Progressing  Goal: Optimal Cognitive Function (Psychotic Signs/Symptoms)  Outcome: Not Progressing  Goal: Increased Participation and Engagement (Psychotic Signs/Symptoms)  Outcome: Not Progressing  Goal: Improved Mood Symptoms  Outcome: Not Progressing  Goal: Improved Psychomotor Symptoms (Psychotic Signs/Symptoms)  Outcome: Not Progressing  Goal: Decreased Sensory Symptoms (Psychotic Signs/Symptoms)  Outcome: Not Progressing  Goal: Improved Sleep (Psychotic Signs/Symptoms)  Outcome: Not Progressing  Goal: Enhanced Social, Occupational or Functional Skills (Psychotic Signs/Symptoms)  Outcome: Not Progressing   Goal Outcome Evaluation:

## 2022-11-30 NOTE — PLAN OF CARE
Occupational Therapy Group Note:     11/30/22 1400   Group Therapy Session   Group Attendance attended group session   Total Time (minutes) 105   Total # Attendees 6   Group Type expressive therapy   Group Topic Covered coping skills/lifestyle management   Group Session Detail OT clinic x2   Patient Response/Contribution cooperative with task   Patient Participation Detail Pt actively participated in occupational therapy clinic to facilitate coping skill exploration, creative expression within personally meaningful activities, and clinical observation of social, cognitive, and kinesthetic performance skills. Pt response: Independent to initiate, gather materials, sequence, and adjust to workspace demands as needed. Demonstrated good focus, planning, problem solving, and attention to detail for selected creative expression task. Able to ask for assistance as needed, and occasionally socialized with peers and staff, though spent a majority of group focused on her task, appearing motivated to complete her task. Appropriately requested to work on her project at the table outside of the group room d/t glasses fogging up when wearing a mask; returned all task materials to writer at the end of group. Pleasant in interactions.

## 2022-11-30 NOTE — PLAN OF CARE
Assessment/Intervention/Current Symtoms and Care Coordination  The patient's care was discussed with the treatment team and chart notes were reviewed.   Patient met with team. She reports she is doing good. Affect has been bright. . Patient is very s engaging.  She continues to deny any thoughts to harm self.   Patient has been attending groups -very social with peers.Patient told team that she plans to stay with her 's son in [facility] every other wkend, and will stay in [place] the rest of the time with people that her BF works for. She continues to minimize events which brought her into the hospital.  VM left for [facility] to discuss court recommendations/ dispo. Awaiting call back.    Discharge Plan or Goal  Pending court resolution. Patient would benefit from residential CD treatment but declines  Psychiatric f/u care in place  Case management services     Barriers to Discharge   S/P significant self injury  Polsubstance use  Homeless  Civil commitment in process     Referral Status  None today     Legal Status     Court Hold- [facility]  Preliminary Hearing 11/28/22 at 10:00am  Trial: 12/5/22 at 9:30am
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
## 2022-11-30 NOTE — PLAN OF CARE
Patient appears sleeping during rounds. Patient no complains of pain and discomfort. Patient no behavioral or any safety concerns @ this time. Will continue to monitor the patient and provide therapeutic intervention as needed. Will continue with current plan of care. Patient had 7 hours of sleep this shift. Will continue with current plan of care.  Problem: Sleep Disturbance  Goal: Adequate Sleep/Rest  Outcome: Progressing

## 2022-12-01 LAB — SARS-COV-2 RNA RESP QL NAA+PROBE: NEGATIVE

## 2022-12-01 PROCEDURE — 250N000013 HC RX MED GY IP 250 OP 250 PS 637

## 2022-12-01 PROCEDURE — 99232 SBSQ HOSP IP/OBS MODERATE 35: CPT | Mod: GC | Performed by: PSYCHIATRY & NEUROLOGY

## 2022-12-01 PROCEDURE — 124N000002 HC R&B MH UMMC

## 2022-12-01 PROCEDURE — 90853 GROUP PSYCHOTHERAPY: CPT

## 2022-12-01 PROCEDURE — G0177 OPPS/PHP; TRAIN & EDUC SERV: HCPCS

## 2022-12-01 PROCEDURE — U0005 INFEC AGEN DETEC AMPLI PROBE: HCPCS

## 2022-12-01 PROCEDURE — 250N000013 HC RX MED GY IP 250 OP 250 PS 637: Performed by: STUDENT IN AN ORGANIZED HEALTH CARE EDUCATION/TRAINING PROGRAM

## 2022-12-01 RX ORDER — ESCITALOPRAM OXALATE 5 MG/1
5 TABLET ORAL DAILY
Status: DISCONTINUED | OUTPATIENT
Start: 2022-12-01 | End: 2022-12-05

## 2022-12-01 RX ADMIN — ALBUTEROL SULFATE 2 PUFF: 90 AEROSOL, METERED RESPIRATORY (INHALATION) at 07:52

## 2022-12-01 RX ADMIN — NICOTINE 7 MG/24 HR DAILY TRANSDERMAL PATCH 1 PATCH: at 07:55

## 2022-12-01 RX ADMIN — IBUPROFEN 800 MG: 400 TABLET, FILM COATED ORAL at 00:25

## 2022-12-01 RX ADMIN — ESCITALOPRAM 5 MG: 5 TABLET, FILM COATED ORAL at 13:35

## 2022-12-01 RX ADMIN — DIPHENHYDRAMINE HYDROCHLORIDE 25 MG: 25 CAPSULE ORAL at 12:23

## 2022-12-01 RX ADMIN — HYDROXYZINE HYDROCHLORIDE 25 MG: 25 TABLET, FILM COATED ORAL at 12:22

## 2022-12-01 RX ADMIN — MELATONIN TAB 3 MG 3 MG: 3 TAB at 00:25

## 2022-12-01 RX ADMIN — GABAPENTIN 300 MG: 300 CAPSULE ORAL at 13:35

## 2022-12-01 RX ADMIN — PRAZOSIN HYDROCHLORIDE 1 MG: 1 CAPSULE ORAL at 19:14

## 2022-12-01 RX ADMIN — HYDROXYZINE HYDROCHLORIDE 25 MG: 25 TABLET, FILM COATED ORAL at 16:52

## 2022-12-01 RX ADMIN — TRAZODONE HYDROCHLORIDE 50 MG: 50 TABLET ORAL at 19:14

## 2022-12-01 RX ADMIN — IBUPROFEN 800 MG: 400 TABLET, FILM COATED ORAL at 16:52

## 2022-12-01 RX ADMIN — GABAPENTIN 300 MG: 300 CAPSULE ORAL at 07:55

## 2022-12-01 RX ADMIN — NICOTINE POLACRILEX 4 MG: 2 LOZENGE ORAL at 12:22

## 2022-12-01 RX ADMIN — NICOTINE POLACRILEX 4 MG: 2 LOZENGE ORAL at 15:55

## 2022-12-01 RX ADMIN — GABAPENTIN 300 MG: 300 CAPSULE ORAL at 19:14

## 2022-12-01 RX ADMIN — LIDOCAINE PATCH 4% 2 PATCH: 40 PATCH TOPICAL at 07:56

## 2022-12-01 RX ADMIN — HYDROXYZINE HYDROCHLORIDE 25 MG: 25 TABLET, FILM COATED ORAL at 07:58

## 2022-12-01 RX ADMIN — HYDROXYZINE HYDROCHLORIDE 25 MG: 25 TABLET, FILM COATED ORAL at 00:25

## 2022-12-01 RX ADMIN — ALBUTEROL SULFATE 2 PUFF: 90 AEROSOL, METERED RESPIRATORY (INHALATION) at 00:27

## 2022-12-01 ASSESSMENT — ACTIVITIES OF DAILY LIVING (ADL)
ORAL_HYGIENE: INDEPENDENT
ADLS_ACUITY_SCORE: 29
ORAL_HYGIENE: INDEPENDENT
ADLS_ACUITY_SCORE: 29
LAUNDRY: WITH SUPERVISION
ADLS_ACUITY_SCORE: 29
ADLS_ACUITY_SCORE: 29
HYGIENE/GROOMING: INDEPENDENT
ADLS_ACUITY_SCORE: 29
ADLS_ACUITY_SCORE: 29
DRESS: INDEPENDENT
ADLS_ACUITY_SCORE: 29
ADLS_ACUITY_SCORE: 29
HYGIENE/GROOMING: INDEPENDENT
ADLS_ACUITY_SCORE: 29
DRESS: INDEPENDENT
ADLS_ACUITY_SCORE: 29

## 2022-12-01 NOTE — PLAN OF CARE
Melatonin given for sleep. Patient complained of lower R knee pain medicated with Ibuprofen, endorsed anxiety-atarax given. Patient had Albuterol PRN for SOB- no respiratory distress noted. Patient slept most of the shift. Incisions  to L lower arm intact, no signs and symptoms of infection noted- dressing changed @ 0700. Patient had 6.5 hours of sleep.  Patient pleasant, calm, no behavioral issues or any safety concerns at this time. Will continue to monitor the patient and provide therapeutic intervention as needed. Notify MD with any concerns. Will continue with current plan of care.

## 2022-12-01 NOTE — PLAN OF CARE
"Pt denies SI, denies psychosis.  Pt social with peers attending groups.  Pt eating well.  Pt room very clean and organized.  Pt reading a book.  Pt affect bright upon approach. Pt meeting with .  Dressing changed and cleaned on right forearm. Stitches intact.  No s/sx's of infection.  No drainage/bleeding.    Problem: Suicidal Behavior  Goal: Suicidal Behavior is Absent or Managed  Outcome: Not Progressing     Problem: Psychotic Signs/Symptoms  Goal: Improved Behavioral Control (Psychotic Signs/Symptoms)  Outcome: Not Progressing  Goal: Optimal Cognitive Function (Psychotic Signs/Symptoms)  Outcome: Not Progressing  Goal: Increased Participation and Engagement (Psychotic Signs/Symptoms)  Outcome: Not Progressing  Goal: Improved Mood Symptoms  Outcome: Not Progressing  Goal: Improved Psychomotor Symptoms (Psychotic Signs/Symptoms)  Outcome: Not Progressing  Intervention: Manage Psychomotor Movement  Recent Flowsheet Documentation  Taken 12/1/2022 1100 by Gabi Cadena RN  Activity (Behavioral Health): up ad sunitha  Goal: Decreased Sensory Symptoms (Psychotic Signs/Symptoms)  Outcome: Not Progressing  Goal: Improved Sleep (Psychotic Signs/Symptoms)  Outcome: Not Progressing  Goal: Enhanced Social, Occupational or Functional Skills (Psychotic Signs/Symptoms)  Outcome: Not Progressing     Problem: Adult Behavioral Health Plan of Care  Goal: Plan of Care Review  Outcome: Not Progressing  Goal: Patient-Specific Goal (Individualization)  Description: You can add care plan individualizations to a care plan. Examples of Individualization might be:  \"Parent requests to be called daily at 9am for status\", \"I have a hard time hearing out of my right ear\", or \"Do not touch me to wake me up as it startles me\".  Outcome: Not Progressing  Goal: Individualized Daily Interaction Plan (IDIP)  Outcome: Not Progressing  Goal: Adheres to Safety Considerations for Self and Others  Outcome: Not Progressing  Intervention: Develop " and Maintain Individualized Safety Plan  Recent Flowsheet Documentation  Taken 12/1/2022 1100 by Gabi Cadena RN  Safety Measures:   environmental rounds completed   safety rounds completed  Goal: Absence of New-Onset Illness or Injury  Outcome: Not Progressing  Intervention: Identify and Manage Fall Risk  Recent Flowsheet Documentation  Taken 12/1/2022 1100 by Gabi Cadena, RN  Safety Measures:   environmental rounds completed   safety rounds completed  Goal: Optimized Coping Skills in Response to Life Stressors  Outcome: Not Progressing  Goal: Develops/Participates in Therapeutic [place] to Support Successful Transition  Outcome: Not Progressing   Goal Outcome Evaluation:

## 2022-12-01 NOTE — PLAN OF CARE
[facility] Occupational Therapy Group Intervention Note     12/01/22 1431   Group Therapy Session   Group Attendance attended group session   Group Session Detail clinic - coping skill exploration, creative expression within personally meaningful activities, and observation of social, cognitive, and kinesthetic performance skills. Response: Pleasant and bright. 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 Hyperverbal throughout discussions.      Masha Mcgill, OT on 12/1/2022 at 2:31 PM
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
## 2022-12-01 NOTE — PROGRESS NOTES
11/30/22 2100   Group Therapy Session   Group Attendance attended group session   Time Session Began 2000   Time Session Ended 2055   Total Time (minutes) 35   Total # Attendees 6   Group Type recreation   Group Topic Covered relaxation techniques   Group Session Detail stress reduction   Patient Response/Contribution cooperative with task   Patient Participation Detail Pt actively participated in a structured Therapeutic Recreation group with a focus on leisure participation, socializing, and exercise. Pt entered group late, but followed along to the guided chair exercise routine and added to the discussion prompts throughout the routine.  Pt was encouraged to use positive imagery with the deep breathing and stretching to foster relaxation, improves focus, and reduce stress.

## 2022-12-01 NOTE — PLAN OF CARE
Problem: Depression  Goal: Improved Mood  Outcome: Progressing     Problem: Anxiety  Goal: Anxiety Reduction or Resolution  Outcome: Progressing     Problem: Suicidal Behavior  Goal: Suicidal Behavior is Absent or Managed  Outcome: Progressing  Flowsheets (Taken 11/30/2022 1841)  Mutually Determined Action Steps (Suicidal Behavior Absent/Managed): shares suicidal thoughts  Individualized Action Step (Suicidal Behavior Absent/Managed): Denied SI     Problem: Pain Acute  Goal: Optimal Pain Control and Function  Outcome: Progressing     Problem: Psychotic Signs/Symptoms  Goal: Increased Participation and Engagement (Psychotic Signs/Symptoms)  Intervention: Facilitate Participation and Engagement  Recent Flowsheet Documentation  Taken 11/30/2022 1700 by Herminio Mclean, RN  Diversional Activity:    music    art work   Goal Outcome Evaluation:    Plan of Care Reviewed With: patient        Hydroxyzine given at 1630 for endorsed anxiety rated at 5, medication effective. Patient denied depression, she denied SI/HI/SIB, contracted for safety. Hyperactive, pressured speech, and disorganized. Pleasant, cooperative and compliant with medication. Patient was visible in milieu, able to interact with peers and staff members. Denied pain or discomfort.

## 2022-12-02 PROCEDURE — 250N000013 HC RX MED GY IP 250 OP 250 PS 637

## 2022-12-02 PROCEDURE — 99232 SBSQ HOSP IP/OBS MODERATE 35: CPT | Mod: GC | Performed by: PSYCHIATRY & NEUROLOGY

## 2022-12-02 PROCEDURE — 124N000002 HC R&B MH UMMC

## 2022-12-02 PROCEDURE — H0001 ALCOHOL AND/OR DRUG ASSESS: HCPCS | Performed by: COUNSELOR

## 2022-12-02 PROCEDURE — H2032 ACTIVITY THERAPY, PER 15 MIN: HCPCS

## 2022-12-02 PROCEDURE — 250N000013 HC RX MED GY IP 250 OP 250 PS 637: Performed by: STUDENT IN AN ORGANIZED HEALTH CARE EDUCATION/TRAINING PROGRAM

## 2022-12-02 RX ADMIN — GABAPENTIN 300 MG: 300 CAPSULE ORAL at 08:08

## 2022-12-02 RX ADMIN — HYDROXYZINE HYDROCHLORIDE 25 MG: 25 TABLET, FILM COATED ORAL at 08:21

## 2022-12-02 RX ADMIN — ESCITALOPRAM 5 MG: 5 TABLET, FILM COATED ORAL at 08:08

## 2022-12-02 RX ADMIN — PRAZOSIN HYDROCHLORIDE 1 MG: 1 CAPSULE ORAL at 19:15

## 2022-12-02 RX ADMIN — HYDROXYZINE HYDROCHLORIDE 25 MG: 25 TABLET, FILM COATED ORAL at 16:13

## 2022-12-02 RX ADMIN — NICOTINE POLACRILEX 4 MG: 2 LOZENGE ORAL at 14:30

## 2022-12-02 RX ADMIN — GABAPENTIN 300 MG: 300 CAPSULE ORAL at 13:55

## 2022-12-02 RX ADMIN — NICOTINE POLACRILEX 4 MG: 2 LOZENGE ORAL at 20:17

## 2022-12-02 RX ADMIN — LIDOCAINE PATCH 4% 2 PATCH: 40 PATCH TOPICAL at 08:08

## 2022-12-02 RX ADMIN — IBUPROFEN 800 MG: 400 TABLET, FILM COATED ORAL at 12:12

## 2022-12-02 RX ADMIN — GABAPENTIN 300 MG: 300 CAPSULE ORAL at 19:15

## 2022-12-02 RX ADMIN — NICOTINE 7 MG/24 HR DAILY TRANSDERMAL PATCH 1 PATCH: at 08:13

## 2022-12-02 RX ADMIN — DIPHENHYDRAMINE HYDROCHLORIDE 25 MG: 25 CAPSULE ORAL at 19:36

## 2022-12-02 RX ADMIN — HYDROXYZINE HYDROCHLORIDE 25 MG: 25 TABLET, FILM COATED ORAL at 20:15

## 2022-12-02 RX ADMIN — NICOTINE POLACRILEX 4 MG: 2 LOZENGE ORAL at 16:13

## 2022-12-02 ASSESSMENT — ACTIVITIES OF DAILY LIVING (ADL)
ADLS_ACUITY_SCORE: 29
DRESS: INDEPENDENT
HYGIENE/GROOMING: INDEPENDENT
ADLS_ACUITY_SCORE: 29
ORAL_HYGIENE: INDEPENDENT

## 2022-12-02 NOTE — PLAN OF CARE
Assessment/Intervention/Current Symtoms and Care Coordination  The patient's care was discussed with the treatment team and chart notes were reviewed.   Patient met with team. She reports she is doing good. Affect has been bright. . Patient is very s engaging.  She continues to deny any thoughts to harm self.     - writer received a call from Marissa with [facility]. Marissa reports that court appointed examiner's recommendation is that patient needs to attend residential CD tx. Marissa reports that she will discuss the recommendation with patient. Lastly, patient is still scheduled to appear in court on 12/5 at 9:30 AM.     - Patient completed CD evaluation today. She continues to minimize use and continues to endorse preference for IOP instead of residential.      Discharge Plan or Goal  Pending court resolution. Patient would benefit from residential CD treatment but declines  Psychiatric f/u care in place  Case management services     Barriers to Discharge   S/P significant self injury  Polsubstance use  Homeless  Civil commitment in process     Referral Status  None today     Legal Status     Court Hold- [facility]  Preliminary Hearing 11/28/22 at 10:00am  Trial: 12/5/22 at 9:30am
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
## 2022-12-02 NOTE — PLAN OF CARE
Problem: Sleep Disturbance  Goal: Adequate Sleep/Rest  Outcome: Progressing   Goal Outcome Evaluation:  Patient appears to have slept for 7 hours. Patient endorsed no new concerns for the overnight. No requests for prn's. Court hearing scheduled today; initial exam at [id number] and hearing at [id number].

## 2022-12-02 NOTE — PLAN OF CARE
12/01/22 2102   Group Therapy Session   Group Attendance attended group session   Time Session Began 2000   Time Session Ended 2100   Total Time (minutes) 60   Total # Attendees 6   Group Type psychotherapeutic   Group Topic Covered coping skills/lifestyle management   Group Session Detail Group participated in a structured therapeutic group with a focus on insight, positive change, choices and problem solving via questions and verbal interactions.   Patient Response/Contribution cooperative with task;discussed personal experience with topic   Patient Participation Detail PT was active in the group, shared personal information. Insight somewhat impaired at times

## 2022-12-02 NOTE — PLAN OF CARE
"  Problem: Sleep Disturbance  Goal: Adequate Sleep/Rest  Outcome: Progressing     Problem: Depression  Goal: Improved Mood  Outcome: Progressing     Problem: Suicidal Behavior  Goal: Suicidal Behavior is Absent or Managed  Outcome: Progressing     Pt is visible in the milieu. Attended all groups this shift.  Pt presented with a bright mood. Nutrition and hydration is adequate. Pt endorsed a high level of anxiety because  of court interview , and wondered \"what's melida na happen next\".   PRN Hydroxyzine requested and given.  Pt denied depression, SI/SIB/hallucinations. Pt said \" I wanna see my man\" when asked about SI. Pt appears unkempt but she promised to take a shower this shift.  Pt complained of knee pain at 7/10 PRN Ibuprofen requested. Pt reported it was helpful. Before shift change, pt was happy about news from her  and  requested for shower.     PRNs this shift: Tylenol, Ibuprofen, Nicorette gums.    Problem: Psychotic Signs/Symptoms  Goal: Increased Participation and Engagement (Psychotic Signs/Symptoms)  Outcome: Progressing     Problem: Adult Behavioral Health Plan of Care  Goal: Patient-Specific Goal (Individualization)  Description: You can add care plan individualizations to a care plan. Examples of Individualization might be:  \"Parent requests to be called daily at 9am for status\", \"I have a hard time hearing out of my right ear\", or \"Do not touch me to wake me up as it startles me\".  Outcome: Progressing   Goal Outcome Evaluation:                        "

## 2022-12-02 NOTE — CONSULTS
12/2/2022  Pt completed her MAREK CA today. Pt did not meet any criteria for an alcohol use disorder diagnosis today. She meet one criteria out of 11 for meth use disorder. Pt would like to attend an IOP MAREK treatment program, but I am concerned about her lack of supportive housing and support people in her life.    [facility] Assessment ID: [id number]    Recommendations:   1)  Complete a residential based or similar treatment program.   2)  Abstain from all mood-altering chemicals unless prescribed by a licensed provider.   3)  Attend, at minimum, 2 weekly support group meetings, such as 12 step based (AA/NA), SMART Recovery, Health Realizations, and/or Refuge Recovery meetings.     4)  Actively work with a female mentor/sponsor on a weekly basis.   5)  Follow all the recommendations of your treatment/medical providers.  6)  Remain law abiding and follow all recommendations of the Courts.    Clinical Substantiation:    Pt is willing to attend a MAREK treatment program. She has never attended a MAREK treatment program before. She is willing to attend an IOP MAREK treatment program, but does not have a safe place to stay. Pt appears to be using substances as a way to cope with her mental health.    Referrals/ Alternatives:  [place]: F [phone]  [place]:  P  F   [facility]: P: [phone] ?F: [phone]     MAREK consult completed by: PETE De Leon.  [facility] Evaluation Department  [address] [place] [address]     *Due to regulation of Title 42 of the Code of Federal Regulations (CFR) Part 2: Confidentiality laws apply to this note and the information wherein.  Thus, this note cannot be copy and pasted into any other health care staff's note nor can it be included in general medical records sent to ANY outside agency without the patient's written consent.

## 2022-12-02 NOTE — PLAN OF CARE
Problem: Depression  Goal: Improved Mood  Outcome: Progressing     Problem: Anxiety  Goal: Anxiety Reduction or Resolution  Outcome: Progressing     Problem: Suicidal Behavior  Goal: Suicidal Behavior is Absent or Managed  Outcome: Progressing  Flowsheets (Taken 12/1/2022 1830)  Mutually Determined Action Steps (Suicidal Behavior Absent/Managed): sets future-oriented goal     Problem: Pain Acute  Goal: Optimal Pain Control and Function  Outcome: Progressing  Intervention: Develop Pain Management Plan  Recent Flowsheet Documentation  Taken 12/1/2022 1652 by Herminio Mclean RN  Pain Management Interventions: medication (see MAR)     Problem: Psychotic Signs/Symptoms  Goal: Optimal Cognitive Function (Psychotic Signs/Symptoms)  Intervention: Support and Promote Cognitive Ability  Recent Flowsheet Documentation  Taken 12/1/2022 1700 by Herminio Mclean RN  Trust Relationship/Rapport:    choices provided    care explained    emotional support provided    empathic listening provided    questions answered    questions encouraged    reassurance provided    thoughts/feelings acknowledged  Goal: Increased Participation and Engagement (Psychotic Signs/Symptoms)  Intervention: Facilitate Participation and Engagement  Recent Flowsheet Documentation  Taken 12/1/2022 1700 by Herminio Mclean RN  Diversional Activity:    television    music  Goal: Improved Mood Symptoms  Intervention: Optimize Emotion and Mood  Recent Flowsheet Documentation  Taken 12/1/2022 1700 by Herminio Mclean RN  Diversional Activity:    television    music  Goal: Improved Psychomotor Symptoms (Psychotic Signs/Symptoms)  Intervention: Manage Psychomotor Movement  Recent Flowsheet Documentation  Taken 12/1/2022 1700 by Herminio Mclean RN  Diversional Activity:    television    music  Activity (Behavioral Health): up ad sunitha  Goal: Enhanced Social, Occupational or Functional Skills (Psychotic Signs/Symptoms)  Intervention: Promote Social,  Occupational and Functional Ability  Recent Flowsheet Documentation  Taken 12/1/2022 1700 by Herminio Mclean RN  Trust Relationship/Rapport:    choices provided    care explained    emotional support provided    empathic listening provided    questions answered    questions encouraged    reassurance provided    thoughts/feelings acknowledged   Goal Outcome Evaluation:    Plan of Care Reviewed With: patient        Patient continues to be somewhat disorganized, pressure speech and intermittently hyperactive. Visible in milieu, social, interactive with peers and staff members. Reported anxiety at 5, Hydroxyzine PRN given and was effective. Denied any depression, no SI/HI/SIB, contracted for safety. PRN Ibuprofen given for right knee pain 7/10, knee brace in place, medication effective. Pleasant, cooperative and compliant with medications.
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
## 2022-12-02 NOTE — PROGRESS NOTES
12/02/22 1400   Group Therapy Session   Group Attendance attended group session   Time Session Began 1015   Time Session Ended 1315   Total Time (minutes) 120   Total # Attendees 6   Group Type expressive therapy   Group Topic Covered emotions/expression   Patient Response/Contribution cooperative with task     Art Therapy group was an open studio, non-directive group this morning. Pt self-initiated a few projects today. Pt was somewhat disorganized with materials and scattered materials around her table. Pt appeared enthusiastic about the creative process and was engaged in conversations about art with other pts. Pt has not yet processed her art with author or group.   Pts mood was calm.

## 2022-12-03 PROCEDURE — 250N000013 HC RX MED GY IP 250 OP 250 PS 637

## 2022-12-03 PROCEDURE — 124N000002 HC R&B MH UMMC

## 2022-12-03 PROCEDURE — G0177 OPPS/PHP; TRAIN & EDUC SERV: HCPCS

## 2022-12-03 PROCEDURE — 250N000013 HC RX MED GY IP 250 OP 250 PS 637: Performed by: STUDENT IN AN ORGANIZED HEALTH CARE EDUCATION/TRAINING PROGRAM

## 2022-12-03 RX ADMIN — DIPHENHYDRAMINE HYDROCHLORIDE 25 MG: 25 CAPSULE ORAL at 09:17

## 2022-12-03 RX ADMIN — HYDROXYZINE HYDROCHLORIDE 25 MG: 25 TABLET, FILM COATED ORAL at 08:29

## 2022-12-03 RX ADMIN — NICOTINE 7 MG/24 HR DAILY TRANSDERMAL PATCH 1 PATCH: at 08:27

## 2022-12-03 RX ADMIN — IBUPROFEN 800 MG: 400 TABLET, FILM COATED ORAL at 08:30

## 2022-12-03 RX ADMIN — GABAPENTIN 300 MG: 300 CAPSULE ORAL at 14:27

## 2022-12-03 RX ADMIN — PRAZOSIN HYDROCHLORIDE 1 MG: 1 CAPSULE ORAL at 19:24

## 2022-12-03 RX ADMIN — HYDROXYZINE HYDROCHLORIDE 25 MG: 25 TABLET, FILM COATED ORAL at 17:11

## 2022-12-03 RX ADMIN — NICOTINE POLACRILEX 4 MG: 2 LOZENGE ORAL at 19:28

## 2022-12-03 RX ADMIN — ESCITALOPRAM 5 MG: 5 TABLET, FILM COATED ORAL at 08:27

## 2022-12-03 RX ADMIN — GABAPENTIN 300 MG: 300 CAPSULE ORAL at 19:24

## 2022-12-03 RX ADMIN — LIDOCAINE PATCH 4% 2 PATCH: 40 PATCH TOPICAL at 08:27

## 2022-12-03 RX ADMIN — GABAPENTIN 300 MG: 300 CAPSULE ORAL at 08:27

## 2022-12-03 ASSESSMENT — ACTIVITIES OF DAILY LIVING (ADL)
ADLS_ACUITY_SCORE: 29

## 2022-12-03 NOTE — PLAN OF CARE
Problem: Sleep Disturbance  Goal: Adequate Sleep/Rest  12/3/2022 0414 by Alexus Capone, RN  Outcome: Progressing                   Pt appeared to have slept for 7 hours. No complain of pain or discomfort, and no PRN medications were given. 15 minutes safety checks were in place during shift. Staff will continue to offer support to pt.

## 2022-12-03 NOTE — PLAN OF CARE
Occupational Therapy Group Note:       12/03/22 1717   Group Therapy Session   Group Attendance attended group session   Time Session Began 1315   Time Session Ended 1410   Total Time (minutes) 55   Total # Attendees 6   Group Type expressive therapy;recreation   Group Topic Covered coping skills/lifestyle management;emotions/expression;leisure exploration/use of leisure time;structured socialization   Group Session Detail OT clinic   Patient Response/Contribution cooperative with task;offered helpful suggestions to peers   Patient Participation Detail Pt actively participated in occupational therapy clinic to facilitate coping skill exploration, creative expression within personally meaningful activities, and clinical observation of social, cognitive, and kinesthetic performance skills. Pt response: independent to initiate, gather materials, sequence, and adjust to workspace demands as needed. Demonstrated fair focus. Patient began by working on cards for family members; however, half-way through group, decided to assist a peer with beading task. Patient reports she has knowledge and background in beading activities and wanted to offer help to peer. Patient appeared semi-disorganized with work-space and had multiple projects/supplies on the table. Patient was appropriate with peers and staff this date.

## 2022-12-03 NOTE — PLAN OF CARE
Problem: Depression  Goal: Improved Mood  Outcome: Progressing     Problem: Anxiety  Goal: Anxiety Reduction or Resolution  Outcome: Progressing     Problem: Suicidal Behavior  Goal: Suicidal Behavior is Absent or Managed  Outcome: Progressing  Flowsheets (Taken 12/2/2022 1824)  Mutually Determined Action Steps (Suicidal Behavior Absent/Managed): sets future-oriented goal     Problem: Pain Acute  Goal: Optimal Pain Control and Function  Outcome: Progressing     Problem: Psychotic Signs/Symptoms  Goal: Optimal Cognitive Function (Psychotic Signs/Symptoms)  Intervention: Support and Promote Cognitive Ability  Recent Flowsheet Documentation  Taken 12/2/2022 1730 by Herminio Mclean RN  Trust Relationship/Rapport:    care explained    choices provided    emotional support provided    empathic listening provided    questions answered    questions encouraged    reassurance provided    thoughts/feelings acknowledged  Goal: Improved Psychomotor Symptoms (Psychotic Signs/Symptoms)  Intervention: Manage Psychomotor Movement  Recent Flowsheet Documentation  Taken 12/2/2022 1730 by Herminio Mclean RN  Activity (Behavioral Health): up ad sunitha  Goal: Enhanced Social, Occupational or Functional Skills (Psychotic Signs/Symptoms)  Intervention: Promote Social, Occupational and Functional Ability  Recent Flowsheet Documentation  Taken 12/2/2022 1730 by Herminio Mclean RN  Trust Relationship/Rapport:    care explained    choices provided    emotional support provided    empathic listening provided    questions answered    questions encouraged    reassurance provided    thoughts/feelings acknowledged   Goal Outcome Evaluation:    Plan of Care Reviewed With: patient        Alert and oriented x4, able to communicate needs and verbalize feelings. Hyper active, hyper verbal, somewhat disorganized. Visible in milieu, loud at time but redirectable. Pleasant, cooperative and compliant with medications. Social and interactive with  peers. Anxiety rated at 6, Hydroxyzine PRN given x2. Reported no depression, denied SI/HI/SIB, contracted for safety. Lt forearm laceration sites OA, no drainage, no signs of infection.

## 2022-12-03 NOTE — PLAN OF CARE
"Problem: Suicidal Behavior  Goal: Suicidal Behavior is Absent or Managed  Outcome: Progressing    Patient is alert and oriented x3, calm and cooperative. Patient visible in the milieu for the most part of the shift appearing interactive and  social selectively with her roommate. After her roommate discharged, patient became isolative in her room and engaged in cleaning activities \"to keep me busy\". Patient presents with flat affect, anxious/depressed mood and some insight into her condition. Patient endorsed high anxiety and depression rating 9/10 for both. Patient requested and accepted prn hydroxyzine 25 mg with some relief reported. Patient denies auditory hallucinations, thoughts of paranoia, and thoughts of harming self/others. Patient is able to contract for safety. No acute behavioral concerns thi shift.     Patient is eating, drinking and eating adequately. Patient is able to voice her needs appropriately. Patient is medication compliant. No medication side effects noted/reported. Patient endorses a generalized pain 7/10 and requested prn Ibuprofen 800 mg with some relief reported. Patient reported c/o of generalized mild rush that to have appeared since yesterday after visit with therapy dog in the unit. She accepted 25 mg benadryl with some relief reported. Writer observed several small dots on patient's arms and legs. Patient denies any other physical discomfort. Wound laceration on left forearm looks clean and intact with no observable drainage. No s/s of infection. VSS /78 (BP Location: Left arm, Patient Position: Sitting, Cuff Size: Adult Regular)   Pulse 93   Temp 97.5  F (36.4  C) (Oral)   Resp 16   Wt 64.4 kg (142 lb)   LMP  (LMP Unknown)   SpO2 98%       "

## 2022-12-04 PROCEDURE — 250N000013 HC RX MED GY IP 250 OP 250 PS 637

## 2022-12-04 PROCEDURE — 250N000013 HC RX MED GY IP 250 OP 250 PS 637: Performed by: STUDENT IN AN ORGANIZED HEALTH CARE EDUCATION/TRAINING PROGRAM

## 2022-12-04 PROCEDURE — 124N000002 HC R&B MH UMMC

## 2022-12-04 RX ADMIN — LIDOCAINE PATCH 4% 2 PATCH: 40 PATCH TOPICAL at 08:09

## 2022-12-04 RX ADMIN — PRAZOSIN HYDROCHLORIDE 1 MG: 1 CAPSULE ORAL at 19:03

## 2022-12-04 RX ADMIN — GABAPENTIN 300 MG: 300 CAPSULE ORAL at 19:03

## 2022-12-04 RX ADMIN — HYDROXYZINE HYDROCHLORIDE 25 MG: 25 TABLET, FILM COATED ORAL at 19:04

## 2022-12-04 RX ADMIN — NICOTINE 7 MG/24 HR DAILY TRANSDERMAL PATCH 1 PATCH: at 08:09

## 2022-12-04 RX ADMIN — HYDROXYZINE HYDROCHLORIDE 25 MG: 25 TABLET, FILM COATED ORAL at 08:12

## 2022-12-04 RX ADMIN — NICOTINE POLACRILEX 4 MG: 2 LOZENGE ORAL at 17:22

## 2022-12-04 RX ADMIN — GABAPENTIN 300 MG: 300 CAPSULE ORAL at 08:10

## 2022-12-04 RX ADMIN — HYDROXYZINE HYDROCHLORIDE 25 MG: 25 TABLET, FILM COATED ORAL at 12:58

## 2022-12-04 RX ADMIN — IBUPROFEN 800 MG: 400 TABLET, FILM COATED ORAL at 09:41

## 2022-12-04 RX ADMIN — ESCITALOPRAM 5 MG: 5 TABLET, FILM COATED ORAL at 08:10

## 2022-12-04 RX ADMIN — GABAPENTIN 300 MG: 300 CAPSULE ORAL at 14:39

## 2022-12-04 RX ADMIN — ALBUTEROL SULFATE 2 PUFF: 90 AEROSOL, METERED RESPIRATORY (INHALATION) at 17:19

## 2022-12-04 ASSESSMENT — ACTIVITIES OF DAILY LIVING (ADL)
ADLS_ACUITY_SCORE: 29

## 2022-12-04 NOTE — PLAN OF CARE
Problem: Depression  Goal: Improved Mood  Outcome: Progressing    Problem: Suicidal Behavior  Goal: Suicidal Behavior is Absent or Managed  Outcome: Progressing    Patient is alert and oriented x3, calm and cooperative. Patient visible in the milieu  appearing interactive and  Social. At beginning of the shift, patient expressed her anxiety over having a new roommate, but later in the evening she verbalized that she is happy and she liked her new roommate.  Patient presents with brighter affect, anxious/depressed mood and some insight into her condition. Patient endorsed high anxiety and depression rating 7/10 for both. Patient denies auditory hallucinations, thoughts of paranoia, and thoughts of harming self/others. Patient is able to contract for safety. No acute behavioral concerns thi shift.     Patient is eating, drinking and eating adequately. Patient is able to voice her needs appropriately. Patient is medication compliant. No medication side effects noted/reported. Patient denies pain or any other physical discomfort. Wound laceration on left forearm looks clean and intact with no observable drainage. No s/s of infection. VSS /88   Pulse 99   Temp 97.8  F (36.6  C) (Temporal)   Resp 16   Wt 64.4 kg (142 lb)   LMP  (LMP Unknown)   SpO2 99%

## 2022-12-04 NOTE — PROVIDER NOTIFICATION
12/04/22 1600   Wound Arm Laceration   Date First Assessed/Time First Assessed: 11/24/22 1000   Orientation: Anterior  Location: Arm  Wound Type: Laceration  Wound Description (Comments): self inflicted lacerations x 4, all 4 with sutures  Pre-existing: Yes   Wound Bed Dry scab;Pink;Scabbing   Clara-wound Assessment Other (Comment)  (skin intact)   Drainage Amount None   Drainage Color/Characteristics Other (Comment)  (None)   Wound Care/Cleansing Other (Comment)  (21 sutures removed and bacitracin applied)   Dressing Open to air   Dressing Status Other (Comment)  (Open to Air)   Wound Description (WOC) Other (comment )  (21 sutures removed laceration closed)     Patient had 21 sutures put into her left lower forearm on 11/20/22.  Sutures removed today.  Lacerations closed and pink.  No drainage or signs of infection.  Patient denies pain.  Bacitracin applied to areas.

## 2022-12-04 NOTE — PLAN OF CARE
Problem: Sleep Disturbance  Goal: Adequate Sleep/Rest  Outcome: Progressing     Problem: Depression  Goal: Improved Mood  Outcome: Progressing     Pt presented with a bright affect. Medication compliant. Her appetite was good. Her hydration and nutrition is adequate.  Asks for frozen yogurt or pudding.  Pt is intermittently visible in the milieu, she had been doing art work in her room or by the dining area.  Pt stresses about her court hearing tomorrow, and asked for PRNs.  She also stated that she has had bad dreams and this also made her anxious.  Pt denied depression, SI/SIB/ hallucinations.  Pt complained of pain that is relieved by scheduled medications.  Pt was noted to be wiping the dining tables, saying cleaning or cooking helps her anxiety.      Problem: Anxiety  Goal: Anxiety Reduction or Resolution  Outcome: Progressing     Problem: Suicidal Behavior  Goal: Suicidal Behavior is Absent or Managed  Outcome: Progressing     Problem: Psychotic Signs/Symptoms  Goal: Improved Behavioral Control (Psychotic Signs/Symptoms)  Outcome: Progressing   Goal Outcome Evaluation:    Plan of Care Reviewed With: patient

## 2022-12-04 NOTE — PLAN OF CARE
Problem: Sleep Disturbance  Goal: Adequate Sleep/Rest  Outcome: Progressing           Pt appeared to have slept for 7  hours. No complain of pain or discomfort, and no PRN medications were given. 15 minutes safety checks were in place during shift. Staff will continue to offer support to pt.

## 2022-12-05 PROCEDURE — 250N000013 HC RX MED GY IP 250 OP 250 PS 637

## 2022-12-05 PROCEDURE — 99232 SBSQ HOSP IP/OBS MODERATE 35: CPT | Mod: GC | Performed by: PSYCHIATRY & NEUROLOGY

## 2022-12-05 PROCEDURE — 250N000013 HC RX MED GY IP 250 OP 250 PS 637: Performed by: STUDENT IN AN ORGANIZED HEALTH CARE EDUCATION/TRAINING PROGRAM

## 2022-12-05 PROCEDURE — G0177 OPPS/PHP; TRAIN & EDUC SERV: HCPCS

## 2022-12-05 PROCEDURE — 124N000002 HC R&B MH UMMC

## 2022-12-05 RX ORDER — ESCITALOPRAM OXALATE 10 MG/1
10 TABLET ORAL DAILY
Status: DISCONTINUED | OUTPATIENT
Start: 2022-12-06 | End: 2022-12-06

## 2022-12-05 RX ORDER — ESCITALOPRAM OXALATE 5 MG/1
5 TABLET ORAL ONCE
Status: COMPLETED | OUTPATIENT
Start: 2022-12-05 | End: 2022-12-05

## 2022-12-05 RX ORDER — QUETIAPINE FUMARATE 25 MG/1
25-50 TABLET, FILM COATED ORAL 3 TIMES DAILY PRN
Status: DISCONTINUED | OUTPATIENT
Start: 2022-12-05 | End: 2022-12-06

## 2022-12-05 RX ADMIN — IBUPROFEN 800 MG: 400 TABLET, FILM COATED ORAL at 20:29

## 2022-12-05 RX ADMIN — MELATONIN TAB 3 MG 3 MG: 3 TAB at 20:27

## 2022-12-05 RX ADMIN — IBUPROFEN 800 MG: 400 TABLET, FILM COATED ORAL at 13:42

## 2022-12-05 RX ADMIN — TRAZODONE HYDROCHLORIDE 50 MG: 50 TABLET ORAL at 20:27

## 2022-12-05 RX ADMIN — HYDROXYZINE HYDROCHLORIDE 25 MG: 25 TABLET, FILM COATED ORAL at 20:27

## 2022-12-05 RX ADMIN — ACETAMINOPHEN 650 MG: 325 TABLET, FILM COATED ORAL at 17:36

## 2022-12-05 RX ADMIN — NICOTINE POLACRILEX 4 MG: 2 LOZENGE ORAL at 13:45

## 2022-12-05 RX ADMIN — LIDOCAINE PATCH 4% 2 PATCH: 40 PATCH TOPICAL at 08:07

## 2022-12-05 RX ADMIN — NICOTINE 7 MG/24 HR DAILY TRANSDERMAL PATCH 1 PATCH: at 08:13

## 2022-12-05 RX ADMIN — HYDROXYZINE HYDROCHLORIDE 25 MG: 25 TABLET, FILM COATED ORAL at 08:07

## 2022-12-05 RX ADMIN — GABAPENTIN 300 MG: 300 CAPSULE ORAL at 08:07

## 2022-12-05 RX ADMIN — PRAZOSIN HYDROCHLORIDE 1 MG: 1 CAPSULE ORAL at 20:27

## 2022-12-05 RX ADMIN — QUETIAPINE FUMARATE 25 MG: 25 TABLET ORAL at 13:56

## 2022-12-05 RX ADMIN — HYDROXYZINE HYDROCHLORIDE 25 MG: 25 TABLET, FILM COATED ORAL at 16:18

## 2022-12-05 RX ADMIN — GABAPENTIN 300 MG: 300 CAPSULE ORAL at 20:27

## 2022-12-05 RX ADMIN — GABAPENTIN 300 MG: 300 CAPSULE ORAL at 13:42

## 2022-12-05 RX ADMIN — ESCITALOPRAM 5 MG: 5 TABLET, FILM COATED ORAL at 13:42

## 2022-12-05 RX ADMIN — ALBUTEROL SULFATE 2 PUFF: 90 AEROSOL, METERED RESPIRATORY (INHALATION) at 16:12

## 2022-12-05 RX ADMIN — ESCITALOPRAM 5 MG: 5 TABLET, FILM COATED ORAL at 08:07

## 2022-12-05 ASSESSMENT — ACTIVITIES OF DAILY LIVING (ADL)
ADLS_ACUITY_SCORE: 29

## 2022-12-05 NOTE — PLAN OF CARE
Assessment/Intervention/Current Symtoms and Care Coordination  The patient's care was discussed with the treatment team and chart notes were reviewed.   Patient met with team.     Patient had apparently told PPS/CM that she was willing to go to residential CD treatment however,today in court, she refused.  As a result, patient was fully committed.  Team met with patient.  She was confused that she had to go to treatment for 6 mths.  Patient was explained that CD treatment is usually 28 days- and that the court will follow her for 6 mths.  Patient was then agreeable to go to treatment.  CTC will have CD assessment sent to [place] ([facility]) and [facility].     Discharge Plan or Goal  Residential CD treatment  Psychiatric f/u care in place  Case management services     Barriers to Discharge   Civil commitment  S/P significant self injury  Polsubstance use  Homeless     Referral Status  Referrals will be made to [place] and [place] today     Legal Status     Civil commitment through [facility]

## 2022-12-05 NOTE — PLAN OF CARE
Pt presented with a bright affect. Pleasant upon approach. Medication compliant. Hydration and nutrition adequate. Pt  Rated her anxiety at 9/10 because she had court hearing this morning.  PRN Hydroxyzine and Seroquel given.  Pt complained of pain at 8/10 , PRN Ibuprofen administered that was reported to be effective. She denied SI/SIB/hallucinations. Pt was restless but manages to distract self with  artwork.       Problem: Sleep Disturbance   Goal: Adequate Sleep/Rest  Outcome: Progressing     Problem: Depression  Goal: Improved Mood  Outcome: Progressing       Problem: Anxiety  Goal: Anxiety Reduction or Resolution  Outcome: Progressing     Problem: Suicidal Behavior  Goal: Suicidal Behavior is Absent or Managed  Outcome: Progressing     Problem: Psychotic Signs/Symptoms  Goal: Increased Participation and Engagement (Psychotic Signs/Symptoms)  Outcome: Progressing   Goal Outcome Evaluation:    Plan of Care Reviewed With: patient

## 2022-12-05 NOTE — PROGRESS NOTES
12/5/2022  I faxed pt's MAREK CA to [place] for [facility] and to [facility] for IP. On the cover sheet, I instructed staff to contact pt's CTC for more information and to coordinate care.    Aditi Phelan MA [facility]  [phone]
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
## 2022-12-05 NOTE — PLAN OF CARE
Problem: Depression  Goal: Improved Mood  Outcome: Progressing   Patient was visible appearing overtly anxious and reporting her anxiety at 10/10 due to the court hearing she has tomorrow morning, reassurance was proved and emotional support given, patient requested hydroxyzine prn 25 mg with minimal effect, affect was bright and it was congruent with mood, she denied SI/SIB or hallucinations, she is anticipating a possible discharge if the court hearing goes well, her vitals were mildly elevated due to her anxiety, hygiene and nutritional intake were adequate, no other concerns this evening will continue to monitor condition.   Problem: Anxiety  Goal: Anxiety Reduction or Resolution  Outcome: Progressing     Problem: Psychotic Signs/Symptoms  Goal: Improved Mood Symptoms  Outcome: Progressing  Flowsheets (Taken 12/4/2022 2059)  Mutually Determined Action Steps (Improved Mood Symptoms):   acknowledges progress   verbalizes increased insight   Goal Outcome Evaluation:    Plan of Care Reviewed With: patient

## 2022-12-06 LAB
FLUAV RNA SPEC QL NAA+PROBE: POSITIVE
FLUBV RNA RESP QL NAA+PROBE: NEGATIVE
RSV RNA SPEC NAA+PROBE: NEGATIVE
SARS-COV-2 RNA RESP QL NAA+PROBE: NEGATIVE

## 2022-12-06 PROCEDURE — 250N000013 HC RX MED GY IP 250 OP 250 PS 637

## 2022-12-06 PROCEDURE — 250N000013 HC RX MED GY IP 250 OP 250 PS 637: Performed by: STUDENT IN AN ORGANIZED HEALTH CARE EDUCATION/TRAINING PROGRAM

## 2022-12-06 PROCEDURE — 99232 SBSQ HOSP IP/OBS MODERATE 35: CPT | Mod: GC | Performed by: PSYCHIATRY & NEUROLOGY

## 2022-12-06 PROCEDURE — 124N000002 HC R&B MH UMMC

## 2022-12-06 PROCEDURE — 87637 SARSCOV2&INF A&B&RSV AMP PRB: CPT | Performed by: PSYCHIATRY & NEUROLOGY

## 2022-12-06 RX ORDER — PSEUDOEPHEDRINE HCL 60 MG
60 TABLET ORAL EVERY 6 HOURS PRN
Status: DISCONTINUED | OUTPATIENT
Start: 2022-12-06 | End: 2022-12-12

## 2022-12-06 RX ORDER — ESCITALOPRAM OXALATE 5 MG/1
5 TABLET ORAL DAILY
Status: DISCONTINUED | OUTPATIENT
Start: 2022-12-07 | End: 2022-12-15 | Stop reason: HOSPADM

## 2022-12-06 RX ADMIN — OLANZAPINE 10 MG: 10 TABLET, FILM COATED ORAL at 16:55

## 2022-12-06 RX ADMIN — PRAZOSIN HYDROCHLORIDE 1 MG: 1 CAPSULE ORAL at 19:40

## 2022-12-06 RX ADMIN — LIDOCAINE PATCH 4% 2 PATCH: 40 PATCH TOPICAL at 08:43

## 2022-12-06 RX ADMIN — IBUPROFEN 800 MG: 400 TABLET, FILM COATED ORAL at 16:45

## 2022-12-06 RX ADMIN — ESCITALOPRAM OXALATE 10 MG: 10 TABLET ORAL at 08:43

## 2022-12-06 RX ADMIN — Medication 1 LOZENGE: at 13:21

## 2022-12-06 RX ADMIN — Medication 1 LOZENGE: at 16:39

## 2022-12-06 RX ADMIN — GABAPENTIN 300 MG: 300 CAPSULE ORAL at 19:40

## 2022-12-06 RX ADMIN — NICOTINE 7 MG/24 HR DAILY TRANSDERMAL PATCH 1 PATCH: at 08:57

## 2022-12-06 RX ADMIN — GABAPENTIN 300 MG: 300 CAPSULE ORAL at 14:25

## 2022-12-06 RX ADMIN — ACETAMINOPHEN 650 MG: 325 TABLET, FILM COATED ORAL at 19:50

## 2022-12-06 RX ADMIN — Medication 1 LOZENGE: at 14:25

## 2022-12-06 RX ADMIN — IBUPROFEN 800 MG: 400 TABLET, FILM COATED ORAL at 08:42

## 2022-12-06 RX ADMIN — GABAPENTIN 300 MG: 300 CAPSULE ORAL at 08:43

## 2022-12-06 RX ADMIN — ACETAMINOPHEN 650 MG: 325 TABLET, FILM COATED ORAL at 14:32

## 2022-12-06 ASSESSMENT — ACTIVITIES OF DAILY LIVING (ADL)
ADLS_ACUITY_SCORE: 29
HYGIENE/GROOMING: INDEPENDENT
ADLS_ACUITY_SCORE: 29
DRESS: INDEPENDENT
ADLS_ACUITY_SCORE: 29
ORAL_HYGIENE: INDEPENDENT

## 2022-12-06 NOTE — PLAN OF CARE
Problem: Psychotic Signs/Symptoms  Goal: Improved Behavioral Control (Psychotic Signs/Symptoms)  Outcome: Progressing   Goal Outcome Evaluation:    Patient reported feeling anxious rating her anxiety 9/10 and reported that she was coming down with cold stating that she was coughing green secretions and her head and throat was hurting. Patient reported that she was getting sick because her roommate was coughing, she started to asked about her Covid result and requesting for medication for sore throat. When writer asked how long patient has been experiencing these symptoms? She responded two hours ago, PRN Tylenol was given for pain in throat she rated 5/10 she denied depression, HI, SI and SIB, was out intermittently in [place] with limited interactions with selected peers. Patient verbalized feeling anxious that she was getting a visitor, at 7:30 her significant other visited and brought clothes and shoes which was placed in patient locker. Patient requested for PRN Atarax, Ibuprofen, Melatonin and Trazodone stating that she wanted to be knock out with sleep for the night. Note left for provider to address cold symptoms, continue to monitor.
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
## 2022-12-06 NOTE — PLAN OF CARE
Pt complained of body aches, coughing up green phlegm, sore throat and feeling dizzy. Updated team, and pt was ordered medications and COVID and Influenza test. Pt tested positive for flu and was transferred to private room. Pt was cooperative and compliant with medications. Endorsed high anxiety and denied depression, SI/SIB and  Hallucinations. Her BP went low on standing. Advised pt to pause when she gets up.  Appetite is poor this shift. She was encouraged to hydrate well. Pt now on droplet precautions.     PRNs given: Ibuprofen,Tylenol and throat lozenges.     Problem: Sleep Disturbance  Goal: Adequate Sleep/Rest  Outcome: Progressing     Problem: Depression  Goal: Improved Mood  Outcome: Progressing     Problem: Suicidal Behavior  Goal: Suicidal Behavior is Absent or Managed  Outcome: Progressing   Goal Outcome Evaluation:    Plan of Care Reviewed With: patient

## 2022-12-06 NOTE — PLAN OF CARE
Assessment/Intervention/Current Symtoms and Care Coordination  The patient's care was discussed with the treatment team and chart notes were reviewed.   Patient met with team.   She is feeling under the weather today- flu-like symptoms.    CD assessment was  sent to [facility] ([facility] and [facility].  CTC will follow up.     Discharge Plan or Goal  Residential MI/CD treatment  Psychiatric f/u care in place  Case management services     Barriers to Discharge   Civil commitment  S/P significant self injury  Polsubstance use  Homeless     Referral Status  Referrals  made to [facility] [facility] and [facility] today     Legal Status     Civil commitment through [facility]

## 2022-12-07 PROCEDURE — 250N000013 HC RX MED GY IP 250 OP 250 PS 637: Performed by: STUDENT IN AN ORGANIZED HEALTH CARE EDUCATION/TRAINING PROGRAM

## 2022-12-07 PROCEDURE — 250N000013 HC RX MED GY IP 250 OP 250 PS 637

## 2022-12-07 PROCEDURE — 124N000002 HC R&B MH UMMC

## 2022-12-07 PROCEDURE — 99232 SBSQ HOSP IP/OBS MODERATE 35: CPT | Mod: GC | Performed by: PSYCHIATRY & NEUROLOGY

## 2022-12-07 RX ORDER — NAPROXEN 250 MG/1
250 TABLET ORAL 3 TIMES DAILY PRN
Status: DISCONTINUED | OUTPATIENT
Start: 2022-12-07 | End: 2022-12-15 | Stop reason: HOSPADM

## 2022-12-07 RX ADMIN — ESCITALOPRAM 5 MG: 5 TABLET, FILM COATED ORAL at 09:14

## 2022-12-07 RX ADMIN — GABAPENTIN 300 MG: 300 CAPSULE ORAL at 13:54

## 2022-12-07 RX ADMIN — IBUPROFEN 800 MG: 400 TABLET, FILM COATED ORAL at 13:55

## 2022-12-07 RX ADMIN — Medication 1 LOZENGE: at 13:11

## 2022-12-07 RX ADMIN — NICOTINE POLACRILEX 4 MG: 2 LOZENGE ORAL at 18:01

## 2022-12-07 RX ADMIN — LIDOCAINE PATCH 4% 2 PATCH: 40 PATCH TOPICAL at 09:16

## 2022-12-07 RX ADMIN — ALBUTEROL SULFATE 2 PUFF: 90 AEROSOL, METERED RESPIRATORY (INHALATION) at 11:07

## 2022-12-07 RX ADMIN — PRAZOSIN HYDROCHLORIDE 1 MG: 1 CAPSULE ORAL at 20:00

## 2022-12-07 RX ADMIN — Medication 1 LOZENGE: at 16:13

## 2022-12-07 RX ADMIN — GABAPENTIN 300 MG: 300 CAPSULE ORAL at 20:00

## 2022-12-07 RX ADMIN — GABAPENTIN 300 MG: 300 CAPSULE ORAL at 09:14

## 2022-12-07 RX ADMIN — NICOTINE 7 MG/24 HR DAILY TRANSDERMAL PATCH 1 PATCH: at 09:16

## 2022-12-07 RX ADMIN — ACETAMINOPHEN 650 MG: 325 TABLET, FILM COATED ORAL at 20:12

## 2022-12-07 RX ADMIN — NAPROXEN 250 MG: 250 TABLET ORAL at 19:05

## 2022-12-07 RX ADMIN — Medication 1 LOZENGE: at 05:08

## 2022-12-07 RX ADMIN — ACETAMINOPHEN 650 MG: 325 TABLET, FILM COATED ORAL at 05:08

## 2022-12-07 ASSESSMENT — ACTIVITIES OF DAILY LIVING (ADL)
ADLS_ACUITY_SCORE: 29
DRESS: INDEPENDENT
ADLS_ACUITY_SCORE: 29
ADLS_ACUITY_SCORE: 29
HYGIENE/GROOMING: HANDWASHING;SHOWER;INDEPENDENT
ADLS_ACUITY_SCORE: 29
ORAL_HYGIENE: INDEPENDENT
ADLS_ACUITY_SCORE: 29
ADLS_ACUITY_SCORE: 29
LAUNDRY: WITH SUPERVISION

## 2022-12-07 NOTE — PLAN OF CARE
"Goal Outcome Evaluation:    Plan of Care Reviewed With: patient            Problem: Suicidal Behavior  Goal: Suicidal Behavior is Absent or Managed  12/7/2022 1729 by Evelia Davis RN  Outcome: Progressing  12/7/2022 1132 by Evelia Davis RN  Outcome: Progressing     Problem: Pain Acute  Goal: Optimal Pain Control and Function  12/7/2022 1729 by Evelia Davis RN  Outcome: Progressing  12/7/2022 1132 by Evelia Davis RN  Outcome: Progressing  Intervention: Develop Pain Management Plan  Recent Flowsheet Documentation  Taken 12/7/2022 0916 by Evelia Davis RN  Pain Management Interventions: medication (see MAR)  Intervention: Prevent or Manage Pain  Recent Flowsheet Documentation  Taken 12/7/2022 0916 by Evelia Davis RN  Medication Review/Management: medications reviewed     Problem: Psychotic Signs/Symptoms  Goal: Improved Behavioral Control (Psychotic Signs/Symptoms)  12/7/2022 1729 by Evelia Davis RN  Outcome: Progressing  12/7/2022 1132 by Evelia Davis RN  Outcome: Progressing        Behavioral  Pt continue on isolation (droplets) pre caution due to influenza A. Pt was sleeping intermittently.  Pt eating and hydrating adequately (in her room). Compliant with medications. Appears unkempt but declined shower; encouraged to shower this evening but pt stated, \"maybe tomorrow\" no behavioral escalation or safety concerns noted this shift. Pleasant and cooperative upon approach; speech fluent and appropriate; Pt denied SI, SIB, HI, and hallucinations; Pt endorsed generalized body ache rated at 10/10. Described her anxiety and depression as \"mild\" this evening. Pt is contract for safety.      Medical     Pt complaints of physical pain/discomfort. Vital signs stable. Blood pressure 124/68, pulse 95, temperature 99.7  F (37.6  C), temperature source Oral, resp. rate 16, weight 64.2 kg (141 lb 8 oz), SpO2 96 %.       PRNS:  Naproxyn  Tylenol   Lozenge (benzocaine-menthol)  Nicotine " lozenge        Plan  Possible discharge to CD treatment. Awaiting for placement at [facility] ([facility]).

## 2022-12-07 NOTE — PLAN OF CARE
Problem: Sleep Disturbance  Goal: Adequate Sleep/Rest  Outcome: Progressing     Pt appeared to have slept for 6 hours. At 0500, PRN tylenol 650 mg was given for generalized body pain rated 6/10, and Lozenges for sore throat.  PRN medications administration were effective as pt was able to sleep  After administration and no further complain of pain or sore throat. Pt is  on droplet precautions, due to  testing positive for flu. Denies anxiety/ depression, SI/SIB and  Hallucinations.15 minutes safety checks were in place during shift. Staff will continue to offer support to pt.

## 2022-12-07 NOTE — PLAN OF CARE
Assessment/Intervention/Current Symtoms and Care Coordination  The patient's care was discussed with the treatment team and chart notes were reviewed.   Patient met with team.   Patient tested positive for influenza A- has been isolated to room     [facility]  Awaiting review     Discharge Plan or Goal  Residential MI/CD treatment  Psychiatric f/u care in place  Case management services     Barriers to Discharge   Civil commitment  S/P significant self injury PTA  Polsubstance use  Homeless     Referral Status  Referrals made to [facility] for residential MI/CD treatment     Legal Status     Civil commitment through [facility]

## 2022-12-07 NOTE — PLAN OF CARE
"Behavioral  Pt continue on isolation (droplets) pre caution due to influenza A. Pt was sleeping intermittently.  Pt eating and hydrating adequately. Compliant with medications. Appears unkempt but declined shower; no behavioral escalation or safety concerns noted this shift. Pleasant and cooperative upon approach; speech fluent and appropriate; Pt denied SI, SIB, HI, and hallucinations; Pt endorsed generalized body ache rated at 7/10. Described her anxiety as \"high\" denies depression.    Medical    Pt complaints of physical pain/discomfort. Vital signs stable. Blood pressure 110/72, pulse 105, temperature 97.6  F (36.4  C), resp. rate 16, weight 64.2 kg (141 lb 8 oz), SpO2 96 %.       PRNS:  Albuterol INH,   Ibuprofen  Lozenge (benzocaine-menthol)      Plan  Possible discharge to  treatment. Awaiting for placement at [facility]).    Goal Outcome Evaluation:    Plan of Care Reviewed With: patient            Problem: Anxiety  Goal: Anxiety Reduction or Resolution  Outcome: Progressing  Intervention: Promote Anxiety Reduction  Recent Flowsheet Documentation  Taken 12/7/2022 0916 by Evelia Davis RN  Family/Support System Care: self-care encouraged     Problem: Suicidal Behavior  Goal: Suicidal Behavior is Absent or Managed  Outcome: Progressing     Problem: Pain Acute  Goal: Optimal Pain Control and Function  Outcome: Progressing  Intervention: Develop Pain Management Plan  Recent Flowsheet Documentation  Taken 12/7/2022 0916 by Evelia Davis, RN  Pain Management Interventions: medication (see MAR)  Intervention: Prevent or Manage Pain  Recent Flowsheet Documentation  Taken 12/7/2022 0916 by Evelia Davis RN  Medication Review/Management: medications reviewed            "
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
## 2022-12-07 NOTE — PLAN OF CARE
Problem: Depression  Goal: Improved Mood  Outcome: Progressing     Problem: Anxiety  Goal: Anxiety Reduction or Resolution  Outcome: Progressing     Problem: Suicidal Behavior  Goal: Suicidal Behavior is Absent or Managed  Outcome: Progressing  Flowsheets (Taken 12/6/2022 1804)  Mutually Determined Action Steps (Suicidal Behavior Absent/Managed): sets future-oriented goal     Problem: Pain Acute  Goal: Optimal Pain Control and Function  Intervention: Develop Pain Management Plan  Recent Flowsheet Documentation  Taken 12/6/2022 1645 by Herminio Mclean, RN  Pain Management Interventions: medication (see MAR)     Problem: Adult Behavioral Health Plan of Care  Goal: Plan of Care Review  Recent Flowsheet Documentation  Taken 12/6/2022 1645 by Herminio Mclean, RN  Patient Agreement with Plan of Care: agrees   Goal Outcome Evaluation:    Plan of Care Reviewed With: patient        Spent most of the shift in her room. Fluids encouraged, 100% PO intake at supper time. Infection control precaution encouraged. Cooperative and compliant with scheduled medications. Declined PRN Hydroxyzine to managed anxiety, Zyprexa given as she rated anxiety very high, denied depression, no SI/HI, contracted for safety. General pain managed with PRN Ibuprofen and Tylenol. Declined Sudafed for congestion management, occasional productive cough.       /73 (BP Location: Right arm, Patient Position: Sitting, Cuff Size: Adult Regular)   Pulse 93   Temp 98.1  F (36.7  C) (Oral)   Resp 16   Wt 64.2 kg (141 lb 8 oz)   LMP  (LMP Unknown)   SpO2 95%

## 2022-12-07 NOTE — PLAN OF CARE
[facility] Occupational Therapy Brief Assessment Note       12/07/22 1103   General Information   Special Considerations Leta has attended ~7 OT groups throughout current admission   Clinical Impression   Affect Appropriate to situation  (excitable)   Orientation Oriented to person, place and time   Appearance and ADLs General cleanliness observed in most areas   Attention to Internal Stimuli No observed signs   Interaction Skills Intrusive;Initiates appropriately with staff  (impulsive)   Ability to Communicate Needs Independent   Verbal Content Hyperverbal   Ability to Maintain Boundaries Maintains appropriate physical boundaries;Needs occasional cues   Participation Initiates participation   Concentration Concentrates 20-30 minutes   Ability to Concentrate With refocus;Easily distracted;With structure   Follows and Comprehends Directions Independently follows multi-step directions   Memory Needs further assessment   Organization Independently organizes simple tasks  (often initiates a organization-focused task, however unable to attend for >20minutes.)   Decision Making Independent   Planning and Problem Solving Independently plans ahead;Impulsive   Ability to Apply and Learn Concepts Applies within group structure   Self Esteem Can identify positives     Masha Mcgill, OT on 12/7/2022 at 11:12 AM

## 2022-12-08 PROCEDURE — 124N000002 HC R&B MH UMMC

## 2022-12-08 PROCEDURE — 250N000013 HC RX MED GY IP 250 OP 250 PS 637

## 2022-12-08 PROCEDURE — 99232 SBSQ HOSP IP/OBS MODERATE 35: CPT | Mod: GC | Performed by: PSYCHIATRY & NEUROLOGY

## 2022-12-08 RX ADMIN — GABAPENTIN 300 MG: 300 CAPSULE ORAL at 08:03

## 2022-12-08 RX ADMIN — ACETAMINOPHEN 650 MG: 325 TABLET, FILM COATED ORAL at 13:58

## 2022-12-08 RX ADMIN — ALBUTEROL SULFATE 2 PUFF: 90 AEROSOL, METERED RESPIRATORY (INHALATION) at 17:06

## 2022-12-08 RX ADMIN — LIDOCAINE PATCH 4% 2 PATCH: 40 PATCH TOPICAL at 08:13

## 2022-12-08 RX ADMIN — PRAZOSIN HYDROCHLORIDE 1 MG: 1 CAPSULE ORAL at 20:08

## 2022-12-08 RX ADMIN — NICOTINE 7 MG/24 HR DAILY TRANSDERMAL PATCH 1 PATCH: at 08:03

## 2022-12-08 RX ADMIN — ALBUTEROL SULFATE 2 PUFF: 90 AEROSOL, METERED RESPIRATORY (INHALATION) at 07:10

## 2022-12-08 RX ADMIN — ACETAMINOPHEN 650 MG: 325 TABLET, FILM COATED ORAL at 08:16

## 2022-12-08 RX ADMIN — Medication 1 LOZENGE: at 08:13

## 2022-12-08 RX ADMIN — GABAPENTIN 300 MG: 300 CAPSULE ORAL at 13:58

## 2022-12-08 RX ADMIN — ALBUTEROL SULFATE 2 PUFF: 90 AEROSOL, METERED RESPIRATORY (INHALATION) at 21:50

## 2022-12-08 RX ADMIN — Medication 1 LOZENGE: at 21:52

## 2022-12-08 RX ADMIN — ACETAMINOPHEN 650 MG: 325 TABLET, FILM COATED ORAL at 18:02

## 2022-12-08 RX ADMIN — GABAPENTIN 300 MG: 300 CAPSULE ORAL at 20:07

## 2022-12-08 RX ADMIN — ESCITALOPRAM 5 MG: 5 TABLET, FILM COATED ORAL at 08:03

## 2022-12-08 RX ADMIN — NAPROXEN 250 MG: 250 TABLET ORAL at 08:03

## 2022-12-08 RX ADMIN — NAPROXEN 250 MG: 250 TABLET ORAL at 18:03

## 2022-12-08 ASSESSMENT — ACTIVITIES OF DAILY LIVING (ADL)
ADLS_ACUITY_SCORE: 29
ADLS_ACUITY_SCORE: 29
DRESS: INDEPENDENT
ADLS_ACUITY_SCORE: 29
ADLS_ACUITY_SCORE: 29
LAUNDRY: WITH SUPERVISION
ADLS_ACUITY_SCORE: 29
DRESS: INDEPENDENT
LAUNDRY: WITH SUPERVISION
HYGIENE/GROOMING: HANDWASHING;SHOWER;INDEPENDENT
ADLS_ACUITY_SCORE: 29
ORAL_HYGIENE: INDEPENDENT
HYGIENE/GROOMING: INDEPENDENT
ORAL_HYGIENE: INDEPENDENT

## 2022-12-08 NOTE — PLAN OF CARE
Problem: Sleep Disturbance  Goal: Adequate Sleep/Rest  Outcome: Progressing     Pt appeared to have slept for 7 hours.  At 0710, PRN albuterol inhaler  was given for SOB, which was effective. Pt is  on droplet precautions for influenza A. Denies anxiety/ depression, SI/SIB and  Hallucinations.15 minutes safety checks were in place during shift. Staff will continue to offer support to pt.
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
## 2022-12-08 NOTE — PLAN OF CARE
Assessment/Intervention/Current Symtoms and Care Coordination  The patient's care was discussed with the treatment team and chart notes were reviewed.   Patient met with team.   Patient tested positive for influenza A- has been isolated to room.  Reports feeling better today but still has body aches.  [facility] provided patient with art supplies as she is      CD assessment was  sent to [facility]) and [facility]. Apparently assessment was not received by [facility]- [facility] left with  to resend.     Discharge Plan or Goal  Residential MI/CD treatment  Psychiatric f/u care in place  Case management services     Barriers to Discharge   Civil commitment  S/P significant self injury PTA  Polsubstance use  Homeless     Referral Status  Referrals made to [facility] and [facility] for residential MI/CD treatment     Legal Status     Civil commitment through [facility]

## 2022-12-08 NOTE — PLAN OF CARE
"Goal Outcome Evaluation:    Plan of Care Reviewed With: patient          Problem: Depression  Goal: Improved Mood  Outcome: Progressing  Intervention: Monitor and Manage Depressive Symptoms  Recent Flowsheet Documentation  Taken 12/8/2022 1000 by Evelia Davis, RN  Family/Support System Care: self-care encouraged     Problem: Anxiety  Goal: Anxiety Reduction or Resolution  Outcome: Progressing  Intervention: Promote Anxiety Reduction  Recent Flowsheet Documentation  Taken 12/8/2022 1000 by Evelia Davis RN  Family/Support System Care: self-care encouraged     Problem: Suicidal Behavior  Goal: Suicidal Behavior is Absent or Managed  Outcome: Progressing     Problem: Pain Acute  Goal: Optimal Pain Control and Function  Outcome: Progressing  Intervention: Prevent or Manage Pain  Recent Flowsheet Documentation  Taken 12/8/2022 1000 by Evelia Davis, RN  Medication Review/Management: medications reviewed        Behavioral  Pt slept 7 hours overnight; Pt appears to be eating and hydrating adequately in her room. Compliant with medications. Hygiene is appropriately; Pt took a shower this afternoon with lots of encouragement. No behavioral escalation or safety concerns noted this shift. Pt is pleasant and cooperative upon approach; Pt speech fluent and appropriate; denied SI, SIB, HI, and hallucinations; Endorsed \"some\" anxiety and depression. Continue to endorse generalized body aches 8/10. PRN pain medicine was utilized. Pt affect is flat; mood is calm but frustrated at times;    Medical    Isolation precautions (droplets) due to Influenza A. Vital signs stable. Blood pressure 121/78, pulse 91, temperature 99  F (37.2  C), resp. rate 16, weight 64.2 kg (141 lb 8 oz), SpO2 97 %.        PRNS:  Naproxyn  Tylenol  Cepacol       Discharge Plan or Goal  Residential MI/CD treatment  Psychiatric f/u care in place  Case management services     Barriers to Discharge   Civil commitment  S/P significant self " injury PTA  Polsubstance use  Homeless     Referral Status  Referrals made to [facility] and [facility] for residential MI/CD treatment     Legal Status     Civil commitment through [facility]

## 2022-12-09 PROCEDURE — 250N000013 HC RX MED GY IP 250 OP 250 PS 637

## 2022-12-09 PROCEDURE — 99232 SBSQ HOSP IP/OBS MODERATE 35: CPT | Mod: GC | Performed by: PSYCHIATRY & NEUROLOGY

## 2022-12-09 PROCEDURE — 124N000002 HC R&B MH UMMC

## 2022-12-09 RX ORDER — VALACYCLOVIR HYDROCHLORIDE 500 MG/1
500 TABLET, FILM COATED ORAL EVERY 12 HOURS SCHEDULED
Status: COMPLETED | OUTPATIENT
Start: 2022-12-09 | End: 2022-12-12

## 2022-12-09 RX ADMIN — GABAPENTIN 300 MG: 300 CAPSULE ORAL at 19:50

## 2022-12-09 RX ADMIN — ACETAMINOPHEN 650 MG: 325 TABLET, FILM COATED ORAL at 16:25

## 2022-12-09 RX ADMIN — Medication 1 LOZENGE: at 16:45

## 2022-12-09 RX ADMIN — NICOTINE 7 MG/24 HR DAILY TRANSDERMAL PATCH 1 PATCH: at 08:24

## 2022-12-09 RX ADMIN — NAPROXEN 250 MG: 250 TABLET ORAL at 08:41

## 2022-12-09 RX ADMIN — ALBUTEROL SULFATE 2 PUFF: 90 AEROSOL, METERED RESPIRATORY (INHALATION) at 16:45

## 2022-12-09 RX ADMIN — Medication 1 LOZENGE: at 19:50

## 2022-12-09 RX ADMIN — GABAPENTIN 300 MG: 300 CAPSULE ORAL at 08:24

## 2022-12-09 RX ADMIN — NAPROXEN 250 MG: 250 TABLET ORAL at 16:45

## 2022-12-09 RX ADMIN — ESCITALOPRAM 5 MG: 5 TABLET, FILM COATED ORAL at 08:24

## 2022-12-09 RX ADMIN — ACETAMINOPHEN 650 MG: 325 TABLET, FILM COATED ORAL at 08:37

## 2022-12-09 RX ADMIN — VALACYCLOVIR HYDROCHLORIDE 500 MG: 500 TABLET, FILM COATED ORAL at 19:50

## 2022-12-09 RX ADMIN — LIDOCAINE PATCH 4% 2 PATCH: 40 PATCH TOPICAL at 08:24

## 2022-12-09 RX ADMIN — GABAPENTIN 300 MG: 300 CAPSULE ORAL at 15:07

## 2022-12-09 RX ADMIN — ALBUTEROL SULFATE 2 PUFF: 90 AEROSOL, METERED RESPIRATORY (INHALATION) at 07:23

## 2022-12-09 ASSESSMENT — ACTIVITIES OF DAILY LIVING (ADL)
HYGIENE/GROOMING: INDEPENDENT
ADLS_ACUITY_SCORE: 29
DRESS: SCRUBS (BEHAVIORAL HEALTH);INDEPENDENT
ADLS_ACUITY_SCORE: 29
ORAL_HYGIENE: INDEPENDENT
ORAL_HYGIENE: INDEPENDENT
ADLS_ACUITY_SCORE: 29
HYGIENE/GROOMING: INDEPENDENT
ADLS_ACUITY_SCORE: 29
LAUNDRY: WITH SUPERVISION
ADLS_ACUITY_SCORE: 29
DRESS: INDEPENDENT
ADLS_ACUITY_SCORE: 29

## 2022-12-09 NOTE — PLAN OF CARE
Assessment/Intervention/Current Symtoms and Care Coordination  The patient's care was discussed with the treatment team and chart notes were reviewed.   Patient met with team.   Patient reports that she is feeling better re: flu- but is very bored/restless being isolated to her room.    CD assessment was sent to [facility]  [facility] is currently reviewing patient's records.     Discharge Plan or Goal  Residential MI/CD treatment  Psychiatric f/u care in place  Case management services     Barriers to Discharge   Civil commitment  S/P significant self injury PTA  Polsubstance use  Homeless     Referral Status  Referrals made to [facility] and [facility] for residential MI/CD treatment     Legal Status     Civil commitment through [facility]  Will be provisionally discharged when stable
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
## 2022-12-09 NOTE — PROGRESS NOTES
12/08/22 1000   Group Therapy Session   Group Attendance other (see comments)   Author gave pt a small cardboard chalk pastel box with several chalk pastels and charcoal for art use while pt is in her room due to illness. Pts nurse was informed of this at the time pastels were given to pt. Pt can keep pastels if she does not use them all up and could go with pt at discharge.

## 2022-12-09 NOTE — PLAN OF CARE
Pt spent most of the evening in her room during art, as she continues to be on iso precautions for Influenza A. She complained of overall aches and pain, which was decreased greatly with Tylenol and Naprosyn. Knee pain minimal. VSS. She reported having a good visit with boyfriend and otherwise denies all mental health concerns, and denies SI. She did a lot of art on the walls of her bedroom which she claims can be easily removed. She presented with a full range of affect. She did not talk about plans for CD tx. No sx or complaints of lac areas on forearm. Will continue to monitor.    Problem: Pain Acute  Goal: Optimal Pain Control and Function  Outcome: Progressing     Problem: Psychotic Signs/Symptoms  Goal: Improved Behavioral Control (Psychotic Signs/Symptoms)  Outcome: Progressing   Goal Outcome Evaluation:

## 2022-12-09 NOTE — PLAN OF CARE
Problem: Sleep Disturbance  Goal: Adequate Sleep/Rest  Outcome: Progressing   Goal Outcome Evaluation:  Patient appears to have slept for 6.5 hours. Endorsed no new concerns during brief encounters tonight. Intermittent and infrequent cough. No complaints of pain or requests for prn's tonight. Will continue to monitor and offer support.

## 2022-12-09 NOTE — PLAN OF CARE
Pt has been isolative to her room d/t influenza status. She endorses feeling bored and restless; she has been doing artwork to occupy her time. Pt denies all mental health symptoms upon assessment. She is med compliant without issue, received PRN tylenol and naproxen. No other concerns at this time.     Problem: Depression  Goal: Improved Mood  Outcome: Progressing   Goal Outcome Evaluation:    Plan of Care Reviewed With: patient

## 2022-12-10 PROCEDURE — 250N000013 HC RX MED GY IP 250 OP 250 PS 637

## 2022-12-10 PROCEDURE — 124N000002 HC R&B MH UMMC

## 2022-12-10 RX ADMIN — Medication 1 LOZENGE: at 06:56

## 2022-12-10 RX ADMIN — GABAPENTIN 300 MG: 300 CAPSULE ORAL at 13:42

## 2022-12-10 RX ADMIN — ALBUTEROL SULFATE 2 PUFF: 90 AEROSOL, METERED RESPIRATORY (INHALATION) at 13:42

## 2022-12-10 RX ADMIN — VALACYCLOVIR HYDROCHLORIDE 500 MG: 500 TABLET, FILM COATED ORAL at 19:17

## 2022-12-10 RX ADMIN — ESCITALOPRAM 5 MG: 5 TABLET, FILM COATED ORAL at 08:24

## 2022-12-10 RX ADMIN — Medication 1 LOZENGE: at 16:23

## 2022-12-10 RX ADMIN — NAPROXEN 250 MG: 250 TABLET ORAL at 06:55

## 2022-12-10 RX ADMIN — LIDOCAINE PATCH 4% 2 PATCH: 40 PATCH TOPICAL at 08:24

## 2022-12-10 RX ADMIN — NICOTINE 7 MG/24 HR DAILY TRANSDERMAL PATCH 1 PATCH: at 08:25

## 2022-12-10 RX ADMIN — DIPHENHYDRAMINE HYDROCHLORIDE 25 MG: 25 CAPSULE ORAL at 19:12

## 2022-12-10 RX ADMIN — VALACYCLOVIR HYDROCHLORIDE 500 MG: 500 TABLET, FILM COATED ORAL at 08:24

## 2022-12-10 RX ADMIN — ACETAMINOPHEN 650 MG: 325 TABLET, FILM COATED ORAL at 06:55

## 2022-12-10 RX ADMIN — GABAPENTIN 300 MG: 300 CAPSULE ORAL at 19:17

## 2022-12-10 RX ADMIN — ALBUTEROL SULFATE 2 PUFF: 90 AEROSOL, METERED RESPIRATORY (INHALATION) at 06:36

## 2022-12-10 RX ADMIN — GABAPENTIN 300 MG: 300 CAPSULE ORAL at 08:24

## 2022-12-10 RX ADMIN — NAPROXEN 250 MG: 250 TABLET ORAL at 13:42

## 2022-12-10 RX ADMIN — Medication 1 LOZENGE: at 13:42

## 2022-12-10 ASSESSMENT — ACTIVITIES OF DAILY LIVING (ADL)
ADLS_ACUITY_SCORE: 29
ADLS_ACUITY_SCORE: 29
ORAL_HYGIENE: INDEPENDENT
ADLS_ACUITY_SCORE: 29
DRESS: INDEPENDENT
ADLS_ACUITY_SCORE: 29
HYGIENE/GROOMING: INDEPENDENT
ADLS_ACUITY_SCORE: 29
HYGIENE/GROOMING: INDEPENDENT
LAUNDRY: WITH SUPERVISION
LAUNDRY: WITH SUPERVISION
ADLS_ACUITY_SCORE: 29
ORAL_HYGIENE: INDEPENDENT
DRESS: INDEPENDENT
ADLS_ACUITY_SCORE: 29

## 2022-12-10 NOTE — PLAN OF CARE
Problem: Sleep Disturbance  Goal: Adequate Sleep/Rest  Outcome: Progressing   Goal Outcome Evaluation:  Patient appears to have slept for 6.75 hours. Reported no new issues during the shift. Requested and received prn Tylenol and Naproxen for knee pain this morning. Patient also had prn Menthol Lozenge and Albuterol inhaler, patient endorsed intermittent cough. Patient on droplet precautions due to influenza A, patient remained in the room throughout the noc shift.  Continue with current POC.

## 2022-12-10 NOTE — PLAN OF CARE
Problem: Adult Behavioral Health Plan of Care  Goal: Adheres to Safety Considerations for Self and Others  Outcome: Progressing  Intervention: Develop and Maintain Individualized Safety Plan  Recent Flowsheet Documentation  Taken 12/9/2022 1645 by Katarina Brady RN  Safety Measures:   safety rounds completed   suicide check-in completed   suicide assessment completed  Goal: Develops/Participates in Therapeutic [place] to Support Successful Transition  Outcome: Progressing  Intervention: Foster Therapeutic [place]  Recent Flowsheet Documentation  Taken 12/9/2022 1645 by Katarina Brady RN  Trust Relationship/Rapport:   care explained   choices provided   empathic listening provided   questions answered   questions encouraged   thoughts/feelings acknowledged   Goal Outcome Evaluation:    Plan of Care Reviewed With: patient        Pt presents with a flat affect, tearful, expressed frustration over being stuck in the room. Endorsed anxiety 10/10, depression 10/10, denies SI, SIB, HI, AH, VH, reports generalized body pain at 8/10, PRN Tylenol and Naproxen given with some effects. The resident clarified that as much as we encourage the pt to stay in their room, she is allowed to be out in the milieu, as long as she has a mask on. Pt has been compliant with wearing a mask even though she didn't stay in the milieu. Pt was only out to make needs requests and back to the room. Pt remains on Droplet precaution for influenza A. Pt had an adequate intake for food and fluid, was medication compliant, insight is apprpriate. Contracts for safety.

## 2022-12-10 NOTE — PLAN OF CARE
Pt had a better shift today compared to yesterday, appears to be less lethargic. She continues to endorse generalized body aches, she has been afebrile and has not been heard coughing. She endorses low levels of anxiety and depression, denies SI/SIB and AH/VH. Pt has been spending a majority of her day working on art in her room. She is med compliant without issue, she received PRN naproxen, benzocaine lozenge, and albuterol inhaler. She took a shower today and her appetite has been adequate.     Problem: Anxiety  Goal: Anxiety Reduction or Resolution  Outcome: Progressing   Goal Outcome Evaluation:    Plan of Care Reviewed With: patient

## 2022-12-11 PROCEDURE — 250N000013 HC RX MED GY IP 250 OP 250 PS 637

## 2022-12-11 PROCEDURE — 124N000002 HC R&B MH UMMC

## 2022-12-11 RX ADMIN — NAPROXEN 250 MG: 250 TABLET ORAL at 16:21

## 2022-12-11 RX ADMIN — LIDOCAINE PATCH 4% 2 PATCH: 40 PATCH TOPICAL at 08:26

## 2022-12-11 RX ADMIN — ALBUTEROL SULFATE 2 PUFF: 90 AEROSOL, METERED RESPIRATORY (INHALATION) at 08:42

## 2022-12-11 RX ADMIN — GABAPENTIN 300 MG: 300 CAPSULE ORAL at 08:26

## 2022-12-11 RX ADMIN — Medication 1 LOZENGE: at 08:42

## 2022-12-11 RX ADMIN — ALBUTEROL SULFATE 2 PUFF: 90 AEROSOL, METERED RESPIRATORY (INHALATION) at 06:03

## 2022-12-11 RX ADMIN — Medication 1 LOZENGE: at 06:12

## 2022-12-11 RX ADMIN — ALBUTEROL SULFATE 2 PUFF: 90 AEROSOL, METERED RESPIRATORY (INHALATION) at 17:48

## 2022-12-11 RX ADMIN — ACETAMINOPHEN 650 MG: 325 TABLET, FILM COATED ORAL at 16:21

## 2022-12-11 RX ADMIN — VALACYCLOVIR HYDROCHLORIDE 500 MG: 500 TABLET, FILM COATED ORAL at 08:26

## 2022-12-11 RX ADMIN — GABAPENTIN 300 MG: 300 CAPSULE ORAL at 19:21

## 2022-12-11 RX ADMIN — ESCITALOPRAM 5 MG: 5 TABLET, FILM COATED ORAL at 08:26

## 2022-12-11 RX ADMIN — Medication 1 LOZENGE: at 17:48

## 2022-12-11 RX ADMIN — VALACYCLOVIR HYDROCHLORIDE 500 MG: 500 TABLET, FILM COATED ORAL at 19:21

## 2022-12-11 RX ADMIN — Medication 1 LOZENGE: at 13:12

## 2022-12-11 RX ADMIN — NICOTINE 7 MG/24 HR DAILY TRANSDERMAL PATCH 1 PATCH: at 08:26

## 2022-12-11 RX ADMIN — GABAPENTIN 300 MG: 300 CAPSULE ORAL at 13:11

## 2022-12-11 RX ADMIN — ALBUTEROL SULFATE 2 PUFF: 90 AEROSOL, METERED RESPIRATORY (INHALATION) at 13:12

## 2022-12-11 RX ADMIN — DIPHENHYDRAMINE HYDROCHLORIDE 25 MG: 25 CAPSULE ORAL at 13:11

## 2022-12-11 ASSESSMENT — ACTIVITIES OF DAILY LIVING (ADL)
ADLS_ACUITY_SCORE: 29
ORAL_HYGIENE: INDEPENDENT
ADLS_ACUITY_SCORE: 29
HYGIENE/GROOMING: INDEPENDENT
DRESS: INDEPENDENT
ADLS_ACUITY_SCORE: 29
LAUNDRY: WITH SUPERVISION
ADLS_ACUITY_SCORE: 29
ORAL_HYGIENE: INDEPENDENT
ADLS_ACUITY_SCORE: 29
HYGIENE/GROOMING: INDEPENDENT
ADLS_ACUITY_SCORE: 29
DRESS: INDEPENDENT
LAUNDRY: WITH SUPERVISION

## 2022-12-11 NOTE — PLAN OF CARE
Problem: Suicidal Behavior  Goal: Suicidal Behavior is Absent or Managed  Outcome: Progressing     Problem: Psychotic Signs/Symptoms  Goal: Optimal Cognitive Function (Psychotic Signs/Symptoms)  Intervention: Support and Promote Cognitive Ability  Recent Flowsheet Documentation  Taken 12/10/2022 1637 by Verónica Hartman, RN  Trust Relationship/Rapport:    choices provided    care explained   Goal Outcome Evaluation:    Plan of Care Reviewed With: patient      Patient was mostly in her room doing art work, remain on isolation precaution due to influenza.  Affect was flat and mood calm,was cooperative with care and was excited  to show art work to writer. Patient requested for Prn Benzocaine 6-10 mg 1 lozenge for sore throat. VS done /91 T 97.9. Rechecked Bp drop.   /82 (BP Location: Right arm, Patient Position: Sitting, Cuff Size: Adult Regular)   Pulse 92   Temp 98  F (36.7  C) (Temporal)   Resp 16   Wt 64.2 kg (141 lb 8 oz)   LMP  (LMP Unknown)   SpO2 96%    Patient denies any pain,  anxiety and depression,all mental health symptoms; ( SI, SIB, HI, and  Hallucination} Ate 75% of her dinner with adequate fluid. Patient was complaint with meds and contracted for safety at this time.

## 2022-12-11 NOTE — PLAN OF CARE
Problem: Sleep Disturbance  Goal: Adequate Sleep/Rest  Outcome: Progressing   Goal Outcome Evaluation:  Patient appears to have slept for 6.75 hours. Endorsed  no new concerns during the night. Requested and received prn Albuterol x1 this morning at 0600 AM along with prn Menthol Lozenge. Patient compliant with wearing a mask when out of the room. Continue current POC.

## 2022-12-11 NOTE — PLAN OF CARE
"Pt was initially visible in the milieu working on art and was social with peers; she spent the rest of the evening isolated to her room d/t still wanting to catch up on sleep. She is understanding that she still needs to wear a mask in the milieu until 12/13. She continues to have minor body aches, reported feeling \"hot and cold,\" temp was 99.7. Pt is med compliant without issue, she received PRN naproxen, tylenol, benzocaine lozenge and albuterol inhaler.     Problem: Anxiety  Goal: Anxiety Reduction or Resolution  Outcome: Progressing   Goal Outcome Evaluation:    Plan of Care Reviewed With: patient                   "

## 2022-12-11 NOTE — PLAN OF CARE
Pt has been isolative to her room today, states she is catching up on sleep. She presents with a full range affect and continues to work on art while awake. She denies all mental health symptoms upon assessment, received PRN albuterol inhaler x2, benzocaine lozenges x2, and benadryl.  No other concerns at this time.    Problem: Suicidal Behavior  Goal: Suicidal Behavior is Absent or Managed  Outcome: Progressing   Goal Outcome Evaluation:    Plan of Care Reviewed With: patient

## 2022-12-12 ENCOUNTER — MEDICAL CORRESPONDENCE (OUTPATIENT)
Dept: HEALTH INFORMATION MANAGEMENT | Facility: CLINIC | Age: 57
End: 2022-12-12

## 2022-12-12 PROCEDURE — 250N000013 HC RX MED GY IP 250 OP 250 PS 637

## 2022-12-12 PROCEDURE — 124N000002 HC R&B MH UMMC

## 2022-12-12 PROCEDURE — 99232 SBSQ HOSP IP/OBS MODERATE 35: CPT | Mod: GC | Performed by: PSYCHIATRY & NEUROLOGY

## 2022-12-12 RX ADMIN — DIPHENHYDRAMINE HYDROCHLORIDE 25 MG: 25 CAPSULE ORAL at 07:11

## 2022-12-12 RX ADMIN — Medication 1 LOZENGE: at 05:00

## 2022-12-12 RX ADMIN — Medication 1 LOZENGE: at 07:17

## 2022-12-12 RX ADMIN — Medication 1 LOZENGE: at 10:01

## 2022-12-12 RX ADMIN — ALBUTEROL SULFATE 2 PUFF: 90 AEROSOL, METERED RESPIRATORY (INHALATION) at 05:00

## 2022-12-12 RX ADMIN — ALBUTEROL SULFATE 2 PUFF: 90 AEROSOL, METERED RESPIRATORY (INHALATION) at 18:02

## 2022-12-12 RX ADMIN — ACETAMINOPHEN 650 MG: 325 TABLET, FILM COATED ORAL at 20:24

## 2022-12-12 RX ADMIN — SALINE NASAL SPRAY 1 SPRAY: 1.5 SOLUTION NASAL at 23:50

## 2022-12-12 RX ADMIN — Medication 1 LOZENGE: at 08:29

## 2022-12-12 RX ADMIN — VALACYCLOVIR HYDROCHLORIDE 500 MG: 500 TABLET, FILM COATED ORAL at 08:29

## 2022-12-12 RX ADMIN — ALBUTEROL SULFATE 2 PUFF: 90 AEROSOL, METERED RESPIRATORY (INHALATION) at 23:54

## 2022-12-12 RX ADMIN — NICOTINE 7 MG/24 HR DAILY TRANSDERMAL PATCH 1 PATCH: at 08:29

## 2022-12-12 RX ADMIN — NAPROXEN 250 MG: 250 TABLET ORAL at 17:57

## 2022-12-12 RX ADMIN — GABAPENTIN 300 MG: 300 CAPSULE ORAL at 08:29

## 2022-12-12 RX ADMIN — Medication 1 LOZENGE: at 16:05

## 2022-12-12 RX ADMIN — GABAPENTIN 300 MG: 300 CAPSULE ORAL at 20:12

## 2022-12-12 RX ADMIN — GABAPENTIN 300 MG: 300 CAPSULE ORAL at 13:57

## 2022-12-12 RX ADMIN — Medication 1 LOZENGE: at 13:59

## 2022-12-12 RX ADMIN — NICOTINE POLACRILEX 4 MG: 2 LOZENGE ORAL at 16:04

## 2022-12-12 RX ADMIN — ESCITALOPRAM 5 MG: 5 TABLET, FILM COATED ORAL at 08:29

## 2022-12-12 RX ADMIN — ACETAMINOPHEN 650 MG: 325 TABLET, FILM COATED ORAL at 10:00

## 2022-12-12 RX ADMIN — Medication 1 LOZENGE: at 17:04

## 2022-12-12 RX ADMIN — LIDOCAINE PATCH 4% 2 PATCH: 40 PATCH TOPICAL at 08:29

## 2022-12-12 RX ADMIN — Medication 1 LOZENGE: at 23:50

## 2022-12-12 ASSESSMENT — ACTIVITIES OF DAILY LIVING (ADL)
ADLS_ACUITY_SCORE: 29
LAUNDRY: WITH SUPERVISION
ADLS_ACUITY_SCORE: 29
ADLS_ACUITY_SCORE: 29
HYGIENE/GROOMING: INDEPENDENT
ADLS_ACUITY_SCORE: 29
ADLS_ACUITY_SCORE: 29
DRESS: INDEPENDENT
ADLS_ACUITY_SCORE: 29
ADLS_ACUITY_SCORE: 29
ORAL_HYGIENE: INDEPENDENT
ADLS_ACUITY_SCORE: 29

## 2022-12-12 NOTE — PLAN OF CARE
Pt has been more visible in the milieu today, she spent time playing cards with peers in the lounge. She still endorses mild body aches but states she has been feeling better. Pt is med compliant without issue, she has received PRN benzocaine lozenges x3 and PRN tylenol. She endorses moderate anxiety and denies other mental health symptoms, though states she continues to feel agitated with one specific patient.     Problem: Anxiety  Goal: Anxiety Reduction or Resolution  Outcome: Progressing   Goal Outcome Evaluation:    Plan of Care Reviewed With: patient

## 2022-12-12 NOTE — PLAN OF CARE
Problem: Sleep Disturbance  Goal: Adequate Sleep/Rest  Outcome: Progressing   Goal Outcome Evaluation:  Patient had an uneventful night. Appeared to be sleeping well during routine rounds. Patient slept for approximately 6.75 hours. Requested and received prn Menthol lozenge and the Albuterol inhaler, endorsed cough upon waking up. Continue with current POC.
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
## 2022-12-12 NOTE — PLAN OF CARE
Assessment/Intervention/Current Symtoms and Care Coordination  The patient's care was discussed with the treatment team and chart notes were reviewed.   Patient met with team.   Patient remained on isolation through with flu.  Mood has been fine-     Awaiting word from Devi re: admission     Discharge Plan or Goal  Residential MI/CD treatment  Psychiatric f/u care in place  Case management services     Barriers to Discharge   PLacement  Civil commitment  Polysubstance use  Homeless     Referral Status  Referrals made to [facility] for residential MI/CD treatment     Legal Status     Civil commitment through [facility]  Patient will need a PD

## 2022-12-13 PROCEDURE — 250N000013 HC RX MED GY IP 250 OP 250 PS 637

## 2022-12-13 PROCEDURE — G0177 OPPS/PHP; TRAIN & EDUC SERV: HCPCS

## 2022-12-13 PROCEDURE — 124N000002 HC R&B MH UMMC

## 2022-12-13 PROCEDURE — 99232 SBSQ HOSP IP/OBS MODERATE 35: CPT | Mod: GC | Performed by: PSYCHIATRY & NEUROLOGY

## 2022-12-13 RX ADMIN — LIDOCAINE PATCH 4% 2 PATCH: 40 PATCH TOPICAL at 08:14

## 2022-12-13 RX ADMIN — ALBUTEROL SULFATE 2 PUFF: 90 AEROSOL, METERED RESPIRATORY (INHALATION) at 20:54

## 2022-12-13 RX ADMIN — Medication 1 LOZENGE: at 20:56

## 2022-12-13 RX ADMIN — Medication 1 LOZENGE: at 16:51

## 2022-12-13 RX ADMIN — ALBUTEROL SULFATE 2 PUFF: 90 AEROSOL, METERED RESPIRATORY (INHALATION) at 14:06

## 2022-12-13 RX ADMIN — GABAPENTIN 300 MG: 300 CAPSULE ORAL at 14:06

## 2022-12-13 RX ADMIN — GABAPENTIN 300 MG: 300 CAPSULE ORAL at 08:14

## 2022-12-13 RX ADMIN — GABAPENTIN 300 MG: 300 CAPSULE ORAL at 20:54

## 2022-12-13 RX ADMIN — ALBUTEROL SULFATE 2 PUFF: 90 AEROSOL, METERED RESPIRATORY (INHALATION) at 06:25

## 2022-12-13 RX ADMIN — NAPROXEN 250 MG: 250 TABLET ORAL at 14:08

## 2022-12-13 RX ADMIN — Medication 1 LOZENGE: at 14:07

## 2022-12-13 RX ADMIN — NAPROXEN 250 MG: 250 TABLET ORAL at 06:25

## 2022-12-13 RX ADMIN — ACETAMINOPHEN 650 MG: 325 TABLET, FILM COATED ORAL at 16:51

## 2022-12-13 RX ADMIN — NAPROXEN 250 MG: 250 TABLET ORAL at 20:54

## 2022-12-13 RX ADMIN — SALINE NASAL SPRAY 1 SPRAY: 1.5 SOLUTION NASAL at 20:56

## 2022-12-13 RX ADMIN — NICOTINE 7 MG/24 HR DAILY TRANSDERMAL PATCH 1 PATCH: at 08:21

## 2022-12-13 RX ADMIN — ESCITALOPRAM 5 MG: 5 TABLET, FILM COATED ORAL at 08:14

## 2022-12-13 RX ADMIN — ACETAMINOPHEN 650 MG: 325 TABLET, FILM COATED ORAL at 06:25

## 2022-12-13 RX ADMIN — Medication 1 LOZENGE: at 06:25

## 2022-12-13 ASSESSMENT — ACTIVITIES OF DAILY LIVING (ADL)
DRESS: INDEPENDENT
ORAL_HYGIENE: INDEPENDENT
ORAL_HYGIENE: INDEPENDENT
DRESS: INDEPENDENT
HYGIENE/GROOMING: INDEPENDENT
ADLS_ACUITY_SCORE: 29
ADLS_ACUITY_SCORE: 29
HYGIENE/GROOMING: INDEPENDENT
ADLS_ACUITY_SCORE: 29
LAUNDRY: WITH SUPERVISION
ADLS_ACUITY_SCORE: 29
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
## 2022-12-13 NOTE — PLAN OF CARE
Problem: Sleep Disturbance  Goal: Adequate Sleep/Rest  Outcome: Progressing   Goal Outcome Evaluation:    Patient appears to have slept for 6.25 hours.  Requested and received prn Albuterol inhaler x2 along with prn Menthol Lozenge at the beginning of the shift, endorses infrequent cough and mild wheezing. Patient also requested and received prn Tylenol along with Naproxen for knee pain.  Patient compliant with wearing a mask when out of the room. Continue current POC.

## 2022-12-13 NOTE — PLAN OF CARE
Has been out in the milieu most of the day. Social with peers , watching movies.  Denies suicidal thoughts and thoughts of self harm. Affect is full range and bright.   Has attended groups.  Given PRN naproxen and albuterol inhaler x 1, for general body aches and mild shortness of breath.  Reports having a good day.    Problem: Sleep Disturbance  Goal: Adequate Sleep/Rest  Outcome: Progressing     Problem: Depression  Goal: Improved Mood  Outcome: Progressing     Problem: Anxiety  Goal: Anxiety Reduction or Resolution  Outcome: Progressing     Problem: Suicidal Behavior  Goal: Suicidal Behavior is Absent or Managed  Outcome: Progressing     Problem: Pain Acute  Goal: Optimal Pain Control and Function  Outcome: Progressing     Problem: Psychotic Signs/Symptoms  Goal: Improved Behavioral Control (Psychotic Signs/Symptoms)  Outcome: Progressing  Goal: Optimal Cognitive Function (Psychotic Signs/Symptoms)  Outcome: Progressing  Intervention: Support and Promote Cognitive Ability  Recent Flowsheet Documentation  Taken 12/13/2022 1211 by Maria Luisa Norris RN  Trust Relationship/Rapport: questions answered  Goal: Increased Participation and Engagement (Psychotic Signs/Symptoms)  Outcome: Progressing  Goal: Improved Mood Symptoms  Outcome: Progressing  Goal: Improved Psychomotor Symptoms (Psychotic Signs/Symptoms)  Outcome: Progressing  Goal: Decreased Sensory Symptoms (Psychotic Signs/Symptoms)  Outcome: Progressing  Goal: Improved Sleep (Psychotic Signs/Symptoms)  Outcome: Progressing  Goal: Enhanced Social, Occupational or Functional Skills (Psychotic Signs/Symptoms)  Outcome: Progressing  Intervention: Promote Social, Occupational and Functional Ability  Recent Flowsheet Documentation  Taken 12/13/2022 1211 by Maria Luisa Norris RN  Trust Relationship/Rapport: questions answered     Problem: Adult Behavioral Health Plan of Care  Goal: Plan of Care Review  Outcome: Progressing  Flowsheets (Taken 12/13/2022  "1211)  Patient Agreement with Plan of Care: agrees  Goal: Patient-Specific Goal (Individualization)  Description: You can add care plan individualizations to a care plan. Examples of Individualization might be:  \"Parent requests to be called daily at 9am for status\", \"I have a hard time hearing out of my right ear\", or \"Do not touch me to wake me up as it startles me\".  Outcome: Progressing  Goal: Individualized Daily Interaction Plan (IDIP)  Outcome: Progressing  Goal: Adheres to Safety Considerations for Self and Others  Outcome: Progressing  Goal: Absence of New-Onset Illness or Injury  Outcome: Progressing  Goal: Optimized Coping Skills in Response to Life Stressors  Outcome: Progressing  Goal: Develops/Participates in Therapeutic [place] to Support Successful Transition  Outcome: Progressing  Intervention: Foster Therapeutic [place]  Recent Flowsheet Documentation  Taken 12/13/2022 1211 by Maria Luisa Norris RN  Trust Relationship/Rapport: questions answered   Goal Outcome Evaluation:    Plan of Care Reviewed With: patient                   "

## 2022-12-13 NOTE — PLAN OF CARE
Patient was intermittently visible appearing brighter and socially appropriate, she endorsed generalized pain level of 8/10 that is intermittent, her vitals were stable, she was afebrile, requested Tylenol 650 mg prn with minimal effect, patient continues to endorse baseline passive depressive symptoms and moderate chronic anxiety, she napped few times during shift ans requested throat lozenge few time, she was encouraged to hydrate self and have adequate intake to which she was receptive, she denies SI/SIB or hallucinations, she is medication compliant and adherent to treatment plan no other concerns noted.   Problem: Depression  Goal: Improved Mood  Outcome: Progressing  Intervention: Monitor and Manage Depressive Symptoms  Recent Flowsheet Documentation  Taken 12/13/2022 1713 by Samaria Garcia RN  Family/Support System Care: self-care encouraged     Problem: Anxiety  Goal: Anxiety Reduction or Resolution  Outcome: Progressing  Intervention: Promote Anxiety Reduction  Recent Flowsheet Documentation  Taken 12/13/2022 1713 by Samaria Garcia RN  Family/Support System Care: self-care encouraged     Problem: Psychotic Signs/Symptoms  Goal: Improved Behavioral Control (Psychotic Signs/Symptoms)  Outcome: Progressing  Flowsheets (Taken 12/13/2022 1719)  Mutually Determined Action Steps (Improved Behavioral Control): verbalizes gratifying activity  Individualized Action Step (Improved Behavioral Control): Patient likes drawing art.  Goal: Enhanced Social, Occupational or Functional Skills (Psychotic Signs/Symptoms)  Outcome: Progressing  Intervention: Promote Social, Occupational and Functional Ability  Recent Flowsheet Documentation  Taken 12/13/2022 1713 by Samaria Garcia RN  Trust Relationship/Rapport:   choices provided   reassurance provided   thoughts/feelings acknowledged   Goal Outcome Evaluation:    Plan of Care Reviewed With: patient

## 2022-12-13 NOTE — PLAN OF CARE
Patient was in her room isolative due to recovering from Influenza A, patient reported moderate generalized body aches and pains, on assessment patient was febrile her temperature was 99.8 tylenol 650 mg prn was administered, she presented with upper respiratory congestion and on auscultation there was mild inspiratory and expiratory wheezes noted which is consistent with the Asthma diagnosis, she declined when offered sudafed 60 mg prn stating   it makes her tremorous and anxious, patient requested Flonase but Dr. Ybarra prescribed nasal saline and suggested to offer benadryl, patient denied SI/SIB or hallucination, continues to endorse chronic anxiety managed with medications, patient reported overall improved mood.   Problem: Depression  Goal: Improved Mood  Outcome: Progressing     Problem: Anxiety  Goal: Anxiety Reduction or Resolution  Outcome: Progressing     Problem: Suicidal Behavior  Goal: Suicidal Behavior is Absent or Managed  Outcome: Progressing     Problem: Pain Acute  Goal: Optimal Pain Control and Function  Outcome: Progressing  Intervention: Develop Pain Management Plan  Recent Flowsheet Documentation  Taken 12/12/2022 2038 by Samaria Garcia, RN  Pain Management Interventions: medication (see MAR)   Goal Outcome Evaluation:    Plan of Care Reviewed With: patient

## 2022-12-13 NOTE — PLAN OF CARE
Assessment/Intervention/Current Symtoms and Care Coordination  The patient's care was discussed with the treatment team and chart notes were reviewed.   Patient met with team. Affect bright.      Patient is now off isolation precautions per infection control. States she is feeling better.  [facility] notified of patient status this morning - awaiting admission date.     Discharge Plan or Goal  Residential MI/CD treatment- [facility] f/u care in place  Case management services     Barriers to Discharge   PLacement  Civil commitment  Polysubstance use  Homeless     Referral Status  Referrals made to [facility] [place] for residential MI/CD treatment     Legal Status     Civil commitment through [facility]  Patient will need a PD

## 2022-12-13 NOTE — PLAN OF CARE
[facility] Occupational Therapy Group Intervention Note     12/13/22 1417   Group Therapy Session   Group Attendance attended group sessions x3   Group Type task skill;psychoeducation;recreation   Group Topic Covered coping skills/lifestyle management;relapse prevention;leisure exploration/use of leisure time;structured socialization   Group Session Detail 1 - Mental health management group focused on goal setting for hopefulness and self-development related to meaningful occupations. Education was provided on SMART goals for increased follow-through and success.     2 - Clinic - coping skill exploration, creative expression within personally meaningful activities, and observation of social, cognitive, and kinesthetic performance skills    3 - OT: Leisure exploration offered for increased intrinsic motivation to engage in social non-obligatory occupations, challenge new learning, sequencing, problem solving, and retention via a cognitive group game.    Patient Response/Contribution cooperative with task;organized;offered helpful suggestions to peers;listened actively   Patient Participation Detail 1 - Fully engaged. Demonstrated increased openness and insight as she connected her current stressors r/t grief and trauma to a smaller, specific goal to find an individual therapist.     2 - Congruent affect. IND to gather materials, organize work space, sequence task, and reach task completion. Demonstrated fair social engagement with nearby peers.     3 - Able to learn and follow novel game instructions. Fully engaged in discussion. Reportedly enjoyed the cognitive challenge of this game.      Masha Mcgill, OT on 12/13/2022 at 2:18 PM

## 2022-12-14 PROCEDURE — 250N000013 HC RX MED GY IP 250 OP 250 PS 637

## 2022-12-14 PROCEDURE — 124N000002 HC R&B MH UMMC

## 2022-12-14 PROCEDURE — G0177 OPPS/PHP; TRAIN & EDUC SERV: HCPCS

## 2022-12-14 PROCEDURE — 99232 SBSQ HOSP IP/OBS MODERATE 35: CPT | Mod: GC | Performed by: PSYCHIATRY & NEUROLOGY

## 2022-12-14 RX ORDER — ESCITALOPRAM OXALATE 5 MG/1
5 TABLET ORAL DAILY
Qty: 30 TABLET | Refills: 0 | Status: SHIPPED | OUTPATIENT
Start: 2022-12-15 | End: 2023-04-05

## 2022-12-14 RX ORDER — GABAPENTIN 300 MG/1
300 CAPSULE ORAL 3 TIMES DAILY
Qty: 30 CAPSULE | Refills: 0 | Status: SHIPPED | OUTPATIENT
Start: 2022-12-14

## 2022-12-14 RX ADMIN — ACETAMINOPHEN 650 MG: 325 TABLET, FILM COATED ORAL at 11:36

## 2022-12-14 RX ADMIN — ALBUTEROL SULFATE 2 PUFF: 90 AEROSOL, METERED RESPIRATORY (INHALATION) at 06:19

## 2022-12-14 RX ADMIN — GABAPENTIN 300 MG: 300 CAPSULE ORAL at 19:40

## 2022-12-14 RX ADMIN — NAPROXEN 250 MG: 250 TABLET ORAL at 06:19

## 2022-12-14 RX ADMIN — Medication 1 LOZENGE: at 06:19

## 2022-12-14 RX ADMIN — ESCITALOPRAM 5 MG: 5 TABLET, FILM COATED ORAL at 09:34

## 2022-12-14 RX ADMIN — GABAPENTIN 300 MG: 300 CAPSULE ORAL at 09:34

## 2022-12-14 RX ADMIN — NICOTINE 7 MG/24 HR DAILY TRANSDERMAL PATCH 1 PATCH: at 09:34

## 2022-12-14 RX ADMIN — LIDOCAINE PATCH 4% 2 PATCH: 40 PATCH TOPICAL at 09:35

## 2022-12-14 RX ADMIN — GABAPENTIN 300 MG: 300 CAPSULE ORAL at 14:00

## 2022-12-14 RX ADMIN — ACETAMINOPHEN 650 MG: 325 TABLET, FILM COATED ORAL at 17:07

## 2022-12-14 RX ADMIN — Medication 1 LOZENGE: at 19:41

## 2022-12-14 RX ADMIN — Medication 1 LOZENGE: at 15:03

## 2022-12-14 ASSESSMENT — ACTIVITIES OF DAILY LIVING (ADL)
ADLS_ACUITY_SCORE: 29
ORAL_HYGIENE: INDEPENDENT
ADLS_ACUITY_SCORE: 29
ORAL_HYGIENE: INDEPENDENT
ADLS_ACUITY_SCORE: 29
HYGIENE/GROOMING: HANDWASHING;SHOWER;INDEPENDENT
ADLS_ACUITY_SCORE: 29
HYGIENE/GROOMING: INDEPENDENT
LAUNDRY: WITH SUPERVISION
ADLS_ACUITY_SCORE: 29
ADLS_ACUITY_SCORE: 29
LAUNDRY: WITH SUPERVISION
DRESS: SCRUBS (BEHAVIORAL HEALTH);INDEPENDENT
DRESS: INDEPENDENT

## 2022-12-14 NOTE — PLAN OF CARE
"Goal Outcome Evaluation:    Plan of Care Reviewed With: patient      Problem: Pain Acute  Goal: Optimal Pain Control and Function  Outcome: Progressing     Problem: Psychotic Signs/Symptoms  Goal: Improved Behavioral Control (Psychotic Signs/Symptoms)  Outcome: Progressing   Pt presents with a bright affect, calm mood. Endorsed headache 7/10, PRN Tylenol, helpful, reduced pain to 3/10. Endorsed HI toward a peer who interrupts others with sarcastic comments in group, but the pt do a \"favor\" to herself and left the group and returned to her room to calm down. Pt stated \" just to be nice\". Denied all other mental health symptoms, and contracted for safety. Compliant with all medications. Pt was observed later in group although the other peer was there to. Pt is preparing to go to a treatment center tomorrow, not excited but knows that is the right thing to do. Pt has being dealing with a lot of grief, breaks down with tears talking about the loses, especially her daughter. Writer encouraged pt to find ways of venting to release the anger. Pt uses art to cope, has beautiful art work. No assault behaviors observed, continues on precautions.               "

## 2022-12-14 NOTE — PLAN OF CARE
Patients sleeping most of the shift. Patient complained  of pain Naproxen given. Lozenge given for sore throat and albuterol inhaler for SOB. No behavioral issues or any safety concerns at this time. Patient continues with q 15 minutes safety checks. Will continue to monitor the patient and provide therapeutic intervention as needed. Will continue with current plan of care. Notify MD with any concerns. The patient had 6.75 total hours of sleep this shift.   Problem: Pain Acute  Goal: Optimal Pain Control and Function  Outcome: Progressing     Problem: Sleep Disturbance  Goal: Adequate Sleep/Rest  Outcome: Progressing

## 2022-12-14 NOTE — PLAN OF CARE
Assessment/Intervention/Current Symtoms and Care Coordination  The patient's care was discussed with the treatment team and chart notes were reviewed.   Patient met with team. She continues to report mood is good.  C/O body aches still  Patient has been accepted to [facility].  They are waiting for her MA to be activated before they will schedule an admission date.  Per business office, application has been uploaded at [facility]- not activated yet but request for expedited processing has been made.     Discharge Plan or Goal  Residential MI/CD treatment- [facility]  Psychiatric f/u care in place  Case management services     Barriers to Discharge   PLacement- awaiting MA activation for admission to tx.  Civil commitment  Polysubstance use  Homeless     Referral Status  None today     Legal Status     Civil commitment through [facility]  Patient will need a PD    ADDENDUM:  Patient's MA activated and she is scheduled for admission to [facility] tomorrow at 10:00am  PD completed and signed by patient, faxed to Alexus AMOR MD notified to order 30 day supply of meds.
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
## 2022-12-14 NOTE — PLAN OF CARE
[facility] Occupational Therapy Group Intervention Note     12/14/22 7511   Group Therapy Session   Group Attendance attended group session   Group Type task skill;life skill   Group Topic Covered coping skills/lifestyle management;relapse prevention;structured socialization   Group Session Detail Clinic - coping skill exploration, creative expression within personally meaningful activities, and observation of social, cognitive, and kinesthetic performance skills     Patient Response/Contribution cooperative with task;organized   Patient Participation Detail Congruent affect. Irritable in the context of peer KH. IND to gather materials, organize work space, sequence task, and reach task completion. Demonstrated fair social engagement.     Masha Mcgill OT on 12/14/2022 at 2:39 PM

## 2022-12-15 VITALS
HEART RATE: 83 BPM | OXYGEN SATURATION: 97 % | DIASTOLIC BLOOD PRESSURE: 90 MMHG | WEIGHT: 135.4 LBS | SYSTOLIC BLOOD PRESSURE: 145 MMHG | RESPIRATION RATE: 18 BRPM | TEMPERATURE: 98.9 F

## 2022-12-15 PROCEDURE — 99238 HOSP IP/OBS DSCHRG MGMT 30/<: CPT | Mod: GC | Performed by: PSYCHIATRY & NEUROLOGY

## 2022-12-15 PROCEDURE — 250N000013 HC RX MED GY IP 250 OP 250 PS 637

## 2022-12-15 RX ADMIN — NICOTINE 7 MG/24 HR DAILY TRANSDERMAL PATCH 1 PATCH: at 08:11

## 2022-12-15 RX ADMIN — Medication 1 LOZENGE: at 04:22

## 2022-12-15 RX ADMIN — LIDOCAINE PATCH 4% 2 PATCH: 40 PATCH TOPICAL at 08:19

## 2022-12-15 RX ADMIN — GABAPENTIN 300 MG: 300 CAPSULE ORAL at 08:11

## 2022-12-15 RX ADMIN — ALBUTEROL SULFATE 2 PUFF: 90 AEROSOL, METERED RESPIRATORY (INHALATION) at 04:22

## 2022-12-15 RX ADMIN — ESCITALOPRAM 5 MG: 5 TABLET, FILM COATED ORAL at 08:11

## 2022-12-15 RX ADMIN — NAPROXEN 250 MG: 250 TABLET ORAL at 05:11

## 2022-12-15 RX ADMIN — ACETAMINOPHEN 650 MG: 325 TABLET, FILM COATED ORAL at 05:11

## 2022-12-15 ASSESSMENT — ACTIVITIES OF DAILY LIVING (ADL)
ADLS_ACUITY_SCORE: 29
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
## 2022-12-15 NOTE — PLAN OF CARE
Patient alert and oriented x 4. Patient had albuterol inhaler for SOB-no respiratory distress noted. Patient complained of sore throat-Cepacol lozenge given. Tylenol given for headache and Naproxen for R knee pain. Patient sleeping most of the shift. Denies SI,SIB,HI or AV/VH. Patient appears calm, no behavioral issues noted. Will continue to monitor the patient, provide therapeutic interventions as needed. Will continue with current plan of care. Patient slept  5 hours this shift.  Problem: Sleep Disturbance  Goal: Adequate Sleep/Rest  Outcome: Progressing     Problem: Pain Acute  Goal: Optimal Pain Control and Function  Intervention: Develop Pain Management Plan  Recent Flowsheet Documentation  Taken 12/15/2022 0512 by Martha De La Rosa, RN  Pain Management Interventions: medication (see MAR)

## 2022-12-15 NOTE — PLAN OF CARE
Goal Outcome Evaluation:    Plan of Care Reviewed With: patient          Pt stable for discharged with Provider's approval. Pt was given copy of their discharge instructions and medication administration instructions. All discharge plans and labs were discussed with patient. Pt reports no questions at this time regarding discharge plans. Pt denies any suicidal ideation, plans or intent at this time. Patient accompanied by the unit staff to [facility] Patient is discharged at this time of 1015.

## 2022-12-15 NOTE — PLAN OF CARE
Assessment/Intervention/Current Symtoms and Care Coordination  The patient's care was discussed with the treatment team and chart notes were reviewed.   Patients' insurance activated and patient is transferring to [facility] this morning.  Patient met with team. States she is doing well- s/w nervous about going to a new place, but overall doing well- stable. Remains superficial re: her situation- lacks insight.  Care coordinated with Alexus APARICIO.  Patient signed provisional discharge and it was faxed to Alexus APARICIO to sign, send to court.      Discharge Plan or Goal  Residential MI/CD treatment- [facility]  Psychiatric f/u care in place  Case management services     Barriers to Discharge   None- discharge today     Referral Status  None today     Legal Status     Civil commitment through [facility]  Patient provisionally discharged to [facility]

## 2022-12-15 NOTE — DISCHARGE SUMMARY
Pt stable for discharged with Provider's approval. Pt was given copy of their discharge instructions and medication administration instructions. All discharge plans and labs were discussed with patient. Pt reports no questions at this time regarding discharge plans. Pt denies any suicidal ideation, plans or intent at this time. Patient accompanied by the unit staff to the [facility] where pt discharge via Private ride to the [facility]. Patient is discharged at this time of 1015.

## 2023-04-05 ENCOUNTER — HOSPITAL ENCOUNTER (EMERGENCY)
Facility: CLINIC | Age: 58
Discharge: PSYCHIATRIC OR SUBSTANCE USE FACILITY | End: 2023-04-06
Attending: EMERGENCY MEDICINE | Admitting: EMERGENCY MEDICINE
Payer: COMMERCIAL

## 2023-04-05 DIAGNOSIS — F33.9 EPISODE OF RECURRENT MAJOR DEPRESSIVE DISORDER, UNSPECIFIED DEPRESSION EPISODE SEVERITY (H): ICD-10-CM

## 2023-04-05 DIAGNOSIS — F41.1 GAD (GENERALIZED ANXIETY DISORDER): ICD-10-CM

## 2023-04-05 DIAGNOSIS — G89.29 CHRONIC PAIN OF RIGHT KNEE: ICD-10-CM

## 2023-04-05 DIAGNOSIS — M25.561 CHRONIC PAIN OF RIGHT KNEE: ICD-10-CM

## 2023-04-05 LAB
ALCOHOL BREATH TEST: NORMAL (ref 0–0.01)
AMPHETAMINES UR QL SCN: NORMAL
BARBITURATES UR QL SCN: NORMAL
BENZODIAZ UR QL SCN: NORMAL
BZE UR QL SCN: NORMAL
CANNABINOIDS UR QL SCN: NORMAL
HCG UR QL: NEGATIVE
OPIATES UR QL SCN: NORMAL

## 2023-04-05 PROCEDURE — 99285 EMERGENCY DEPT VISIT HI MDM: CPT | Performed by: FAMILY MEDICINE

## 2023-04-05 PROCEDURE — 81025 URINE PREGNANCY TEST: CPT | Performed by: EMERGENCY MEDICINE

## 2023-04-05 PROCEDURE — 99285 EMERGENCY DEPT VISIT HI MDM: CPT | Mod: 25 | Performed by: FAMILY MEDICINE

## 2023-04-05 PROCEDURE — 90791 PSYCH DIAGNOSTIC EVALUATION: CPT

## 2023-04-05 PROCEDURE — 80307 DRUG TEST PRSMV CHEM ANLYZR: CPT | Performed by: EMERGENCY MEDICINE

## 2023-04-05 PROCEDURE — 250N000013 HC RX MED GY IP 250 OP 250 PS 637: Performed by: FAMILY MEDICINE

## 2023-04-05 RX ORDER — OLANZAPINE 5 MG/1
5 TABLET ORAL DAILY PRN
COMMUNITY

## 2023-04-05 RX ORDER — PAROXETINE 10 MG/1
10 TABLET, FILM COATED ORAL DAILY
Status: DISCONTINUED | OUTPATIENT
Start: 2023-04-06 | End: 2023-04-06 | Stop reason: HOSPADM

## 2023-04-05 RX ORDER — LANOLIN ALCOHOL/MO/W.PET/CERES
3 CREAM (GRAM) TOPICAL
COMMUNITY

## 2023-04-05 RX ORDER — LIDOCAINE 40 MG/G
CREAM TOPICAL 4 TIMES DAILY PRN
COMMUNITY

## 2023-04-05 RX ORDER — EPINEPHRINE 0.3 MG/.3ML
0.3 INJECTION SUBCUTANEOUS PRN
COMMUNITY
Start: 2023-03-27

## 2023-04-05 RX ORDER — IBUPROFEN 600 MG/1
600 TABLET, FILM COATED ORAL EVERY 6 HOURS PRN
COMMUNITY
Start: 2023-03-27

## 2023-04-05 RX ORDER — OLANZAPINE 15 MG/1
15 TABLET ORAL AT BEDTIME
Status: DISCONTINUED | OUTPATIENT
Start: 2023-04-05 | End: 2023-04-06 | Stop reason: HOSPADM

## 2023-04-05 RX ORDER — ALBUTEROL SULFATE 90 UG/1
1-2 AEROSOL, METERED RESPIRATORY (INHALATION) EVERY 6 HOURS PRN
Status: DISCONTINUED | OUTPATIENT
Start: 2023-04-05 | End: 2023-04-06 | Stop reason: HOSPADM

## 2023-04-05 RX ORDER — OLANZAPINE 15 MG/1
15 TABLET ORAL AT BEDTIME
COMMUNITY

## 2023-04-05 RX ORDER — LIDOCAINE 4 G/G
PATCH TOPICAL
COMMUNITY
Start: 2023-03-27

## 2023-04-05 RX ORDER — ESCITALOPRAM OXALATE 5 MG/1
5 TABLET ORAL DAILY
Status: DISCONTINUED | OUTPATIENT
Start: 2023-04-06 | End: 2023-04-05

## 2023-04-05 RX ORDER — LANOLIN ALCOHOL/MO/W.PET/CERES
3 CREAM (GRAM) TOPICAL AT BEDTIME
Status: DISCONTINUED | OUTPATIENT
Start: 2023-04-05 | End: 2023-04-06 | Stop reason: HOSPADM

## 2023-04-05 RX ORDER — IBUPROFEN 800 MG/1
800 TABLET, FILM COATED ORAL 3 TIMES DAILY PRN
Status: DISCONTINUED | OUTPATIENT
Start: 2023-04-05 | End: 2023-04-06 | Stop reason: HOSPADM

## 2023-04-05 RX ORDER — HYDROXYZINE HYDROCHLORIDE 50 MG/1
50 TABLET, FILM COATED ORAL EVERY 6 HOURS PRN
Status: DISCONTINUED | OUTPATIENT
Start: 2023-04-05 | End: 2023-04-06 | Stop reason: HOSPADM

## 2023-04-05 RX ORDER — LANOLIN ALCOHOL/MO/W.PET/CERES
100 CREAM (GRAM) TOPICAL DAILY
COMMUNITY

## 2023-04-05 RX ORDER — HYDROXYZINE HYDROCHLORIDE 50 MG/1
50 TABLET, FILM COATED ORAL 2 TIMES DAILY
COMMUNITY

## 2023-04-05 RX ORDER — GABAPENTIN 300 MG/1
1 CAPSULE ORAL 3 TIMES DAILY
COMMUNITY
Start: 2023-03-27 | End: 2023-04-05

## 2023-04-05 RX ORDER — PAROXETINE 10 MG/1
10 TABLET, FILM COATED ORAL DAILY
COMMUNITY

## 2023-04-05 RX ORDER — HYDROXYZINE HYDROCHLORIDE 50 MG/1
50 TABLET, FILM COATED ORAL 2 TIMES DAILY
Status: DISCONTINUED | OUTPATIENT
Start: 2023-04-05 | End: 2023-04-06 | Stop reason: HOSPADM

## 2023-04-05 RX ORDER — POLYETHYLENE GLYCOL 3350 17 G
2 POWDER IN PACKET (EA) ORAL
Status: DISCONTINUED | OUTPATIENT
Start: 2023-04-05 | End: 2023-04-06 | Stop reason: HOSPADM

## 2023-04-05 RX ORDER — ALBUTEROL SULFATE 90 UG/1
2 AEROSOL, METERED RESPIRATORY (INHALATION) EVERY 4 HOURS PRN
COMMUNITY
Start: 2023-03-27

## 2023-04-05 RX ORDER — GABAPENTIN 300 MG/1
300 CAPSULE ORAL 3 TIMES DAILY
Status: DISCONTINUED | OUTPATIENT
Start: 2023-04-05 | End: 2023-04-06 | Stop reason: HOSPADM

## 2023-04-05 RX ORDER — HYDROXYZINE HYDROCHLORIDE 50 MG/1
50 TABLET, FILM COATED ORAL EVERY 8 HOURS PRN
COMMUNITY

## 2023-04-05 RX ADMIN — Medication 3 MG: at 21:37

## 2023-04-05 RX ADMIN — OLANZAPINE 15 MG: 15 TABLET, FILM COATED ORAL at 22:18

## 2023-04-05 RX ADMIN — GABAPENTIN 300 MG: 300 CAPSULE ORAL at 21:37

## 2023-04-05 RX ADMIN — HYDROXYZINE HYDROCHLORIDE 50 MG: 50 TABLET, FILM COATED ORAL at 21:37

## 2023-04-05 RX ADMIN — NICOTINE POLACRILEX 2 MG: 2 GUM, CHEWING ORAL at 18:56

## 2023-04-05 RX ADMIN — IBUPROFEN 800 MG: 800 TABLET, FILM COATED ORAL at 21:37

## 2023-04-05 RX ADMIN — NICOTINE POLACRILEX 2 MG: 2 LOZENGE ORAL at 19:00

## 2023-04-05 ASSESSMENT — ACTIVITIES OF DAILY LIVING (ADL)
ADLS_ACUITY_SCORE: 35

## 2023-04-05 ASSESSMENT — COLUMBIA-SUICIDE SEVERITY RATING SCALE - C-SSRS
ATTEMPT SINCE LAST CONTACT: NO
TOTAL  NUMBER OF INTERRUPTED ATTEMPTS SINCE LAST CONTACT: NO
2. HAVE YOU ACTUALLY HAD ANY THOUGHTS OF KILLING YOURSELF?: YES
5. HAVE YOU STARTED TO WORK OUT OR WORKED OUT THE DETAILS OF HOW TO KILL YOURSELF? DO YOU INTEND TO CARRY OUT THIS PLAN?: NO
TOTAL  NUMBER OF ABORTED OR SELF INTERRUPTED ATTEMPTS SINCE LAST CONTACT: NO
1. SINCE LAST CONTACT, HAVE YOU WISHED YOU WERE DEAD OR WISHED YOU COULD GO TO SLEEP AND NOT WAKE UP?: YES
6. HAVE YOU EVER DONE ANYTHING, STARTED TO DO ANYTHING, OR PREPARED TO DO ANYTHING TO END YOUR LIFE?: NO
LETHALITY/MEDICAL DAMAGE CODE MOST LETHAL ACTUAL ATTEMPT: MINOR PHYSICAL DAMAGE
REASONS FOR IDEATION SINCE LAST CONTACT: DOES NOT APPLY
SUICIDE, SINCE LAST CONTACT: NO
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
## 2023-04-05 NOTE — ED TRIAGE NOTES
Sad about daughter's death today that happened 10 years ago, birthday coming up Monday. She  of cancer. Got in argument with house member from [employer] Phone number [phone] or [phone]. [facility] brought her here.     Triage Assessment     Row Name 23 1610       Triage Assessment (Adult)    Airway WDL WDL       Respiratory WDL    Respiratory WDL WDL       Skin Circulation/Temperature WDL    Skin Circulation/Temperature WDL WDL       Cardiac WDL    Cardiac WDL WDL       Peripheral/Neurovascular WDL    Peripheral Neurovascular WDL WDL       Cognitive/Neuro/Behavioral WDL    Cognitive/Neuro/Behavioral WDL WDL

## 2023-04-05 NOTE — ED NOTES
Bed: [facility]-  Expected date: 4/5/23  Expected time: 4:15 PM  Means of arrival: Ambulance  Comments:   20, 58yo female, mental health eval
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
## 2023-04-06 VITALS
OXYGEN SATURATION: 97 % | DIASTOLIC BLOOD PRESSURE: 91 MMHG | TEMPERATURE: 97.7 F | RESPIRATION RATE: 18 BRPM | SYSTOLIC BLOOD PRESSURE: 144 MMHG | HEART RATE: 81 BPM

## 2023-04-06 LAB — GLUCOSE BLDC GLUCOMTR-MCNC: 117 MG/DL (ref 70–99)

## 2023-04-06 PROCEDURE — 250N000013 HC RX MED GY IP 250 OP 250 PS 637: Performed by: FAMILY MEDICINE

## 2023-04-06 PROCEDURE — 82962 GLUCOSE BLOOD TEST: CPT

## 2023-04-06 RX ORDER — LIDOCAINE 4 G/G
4 PATCH TOPICAL ONCE
Status: DISCONTINUED | OUTPATIENT
Start: 2023-04-06 | End: 2023-04-06 | Stop reason: HOSPADM

## 2023-04-06 RX ADMIN — GABAPENTIN 300 MG: 300 CAPSULE ORAL at 09:30

## 2023-04-06 RX ADMIN — LIDOCAINE PATCH 4% 1 PATCH: 40 PATCH TOPICAL at 12:38

## 2023-04-06 RX ADMIN — IBUPROFEN 800 MG: 800 TABLET, FILM COATED ORAL at 09:47

## 2023-04-06 RX ADMIN — HYDROXYZINE HYDROCHLORIDE 50 MG: 50 TABLET, FILM COATED ORAL at 09:30

## 2023-04-06 RX ADMIN — PAROXETINE 10 MG: 10 TABLET, FILM COATED ORAL at 10:51

## 2023-04-06 ASSESSMENT — ACTIVITIES OF DAILY LIVING (ADL)
ADLS_ACUITY_SCORE: 35

## 2023-04-06 ASSESSMENT — COLUMBIA-SUICIDE SEVERITY RATING SCALE - C-SSRS
1. SINCE LAST CONTACT, HAVE YOU WISHED YOU WERE DEAD OR WISHED YOU COULD GO TO SLEEP AND NOT WAKE UP?: NO
5. HAVE YOU STARTED TO WORK OUT OR WORKED OUT THE DETAILS OF HOW TO KILL YOURSELF? DO YOU INTEND TO CARRY OUT THIS PLAN?: NO
2. HAVE YOU ACTUALLY HAD ANY THOUGHTS OF KILLING YOURSELF?: NO
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
## 2023-04-06 NOTE — PHARMACY-ADMISSION MEDICATION HISTORY
Admission Medication History Completed by Pharmacy    See [facility] Admission Navigator for allergy information, preferred outpatient pharmacy, prior to admission medications and immunization status.     Medication History Sources:   Dispense report, [facility] MAR    Changes made to PTA medication list (reason):    Added:   Paroxetine 10mg tablet take 10mg PO every day     Deleted:   Escitalopram 5mg tablet take 5mg PO every day (not on facility MAR)    Changed:   Hydroxyzine 50mg PO Q8H PRN ---> 50mg PO BID (updated per MAR)    Additional Information:  List from facility contained medications but did not show timing of doses - unable to document time of last dose      Prior to Admission medications    Medication Sig Last Dose Taking? Auth Provider Long Term End Date   albuterol (PROAIR HFA/PROVENTIL HFA/VENTOLIN HFA) 108 (90 Base) MCG/ACT inhaler Inhale 2 puffs into the lungs every 4 hours as needed Unknown Yes Reported, Patient     EPINEPHrine (ANY BX GENERIC EQUIV) 0.3 MG/0.3ML injection 2-pack Inject 0.3 mg into the muscle as needed Unknown Yes Reported, Patient     gabapentin (NEURONTIN) 300 MG capsule Take 1 capsule (300 mg) by mouth 3 times daily Unknown Yes Oumou, Bull, DO Yes    hydrOXYzine (ATARAX) 50 MG tablet Take 50 mg by mouth 2 times daily Unknown Yes Reported, Patient     hydrOXYzine (ATARAX) 50 MG tablet Take 50 mg by mouth every 8 hours as needed for anxiety  Yes Unknown, Entered By History     ibuprofen (ADVIL/MOTRIN) 600 MG tablet Take 600 mg by mouth every 6 hours as needed Unknown Yes Reported, Patient     Lidocaine (LIDOCARE) 4 % Patch Lidocaine Pain Relief 4 % topical patch, Apply 1 patch every day by topical route. On for 12 hours and remove for 12 hours Unknown Yes Reported, Patient     lidocaine (LMX4) 4 % external cream Apply topically 4 times daily as needed for mild pain  Yes Unknown, Entered By History     melatonin 3 MG tablet Take 3 mg by mouth nightly as needed for sleep Unknown Yes  Reported, Patient     OLANZapine (ZYPREXA) 15 MG tablet Take 15 mg by mouth At Bedtime Unknown Yes Reported, Patient     OLANZapine (ZYPREXA) 5 MG tablet Take 5 mg by mouth daily as needed (agitation) Unknown Yes Reported, Patient     PARoxetine (PAXIL) 10 MG tablet Take 10 mg by mouth daily Unknown Yes Unknown, Entered By History Yes    thiamine (B-1) 100 MG tablet Take 100 mg by mouth daily Unknown Yes Reported, Patient       Date completed: 04/05/23    Medication history completed by: Casper Ellis, Pharmacy Intern

## 2023-04-06 NOTE — DISCHARGE INSTRUCTIONS
"You are comfortably discharged  Recommendations below for ongoing mental health support etc. return if any safety issues.  A Lidoderm patch was placed on your knee leave this on for 12 hours then off for 12 hours then place a new one as needed for pain control in a similar fashion.  Follow-up with your physician regarding knee issues which you have an appointment the state tomorrow.  Return if any concerns of infection etc.        Return to [facility]  [address]  [place], [place] [id number] [phone]      Aftercare Plan  If I am feeling unsafe or I am in a crisis, I will:   Contact my established care providers   Call the National Suicide Prevention Lifeline: 988  Go to the nearest emergency room   Call 911     Warning signs that I or other people might notice when a crisis is developing for me: Becoming quiet, being rude, aggression, isolating, swearing, using substances.     Things I am able to do to cope or help me feel better: Talk to staff, hug my bear, take a break, listen to music, deep breaths, watch tv.     Changes I can make to support my mental health and wellness: Attend program, abstain from substances/alcohol.     People in my life that I can ask for help: Program staff.     Your [facility] has a mental health crisis team you can call 24/7: [facility] Mobile Crisis  [phone] (adults)  [phone] (children)      Crisis Lines  Crisis Text Line  Text 371820  You will be connected with a trained live crisis counselor to provide support.    Por espanol, texto  DORIS a 782021 o texto a 442-AYUDAME en WhatsApp    [employer] (LGBTQ Youth Crisis Line)  7.391.206.3336  text START to 492-542      Community Resources  Fast Tracker  Linking people to mental health and substance use disorder resources  fasttrackermn.org     [place] Mental Health Warm Line  Peer to peer support  Monday thru Saturday, 12 pm to 10 pm  106.907.6687 or 1.702.405.3805  Text \"Support\" to 61280    National " [place] on Mental Illness (GERALDINE)  995.595.9545 or 1.888.GERALDINE.HELPS      Mental Health Apps  My3  https://myPACE Aerospace Engineering and Information Technologypp.org/    VirtualHopeBox  https://DÃ³nde/apps/virtual-hope-box/      Additional Information  Today you were seen by a licensed mental health professional through Triage and Transition services, [facility] (P)  for a crisis assessment in the Emergency Department at [facility].  It is recommended that you follow up with your established providers (psychiatrist, mental health therapist, and/or primary care doctor - as relevant) as soon as possible. Coordinators from [facility] will be calling you in the next 24-48 hours to ensure that you have the resources you need.  You can also contact [facility] coordinators directly at [phone]. You may have been scheduled for or offered an appointment with a mental health provider. [facility] maintains an extensive network of licensed behavioral health providers to connect patients with the services they need.  We do not charge providers a fee to participate in our referral network.  We match patients with providers based on a patient's specific needs, insurance coverage, and location.  Our first effort will be to refer you to a provider within your care system, and will utilize providers outside your care system as needed.
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
## 2025-01-14 ENCOUNTER — TELEPHONE (OUTPATIENT)
Dept: BEHAVIORAL HEALTH | Facility: CLINIC | Age: 60
End: 2025-01-14
Payer: COMMERCIAL

## 2025-01-14 ENCOUNTER — HOSPITAL ENCOUNTER (EMERGENCY)
Facility: HOSPITAL | Age: 60
End: 2025-01-14
Attending: STUDENT IN AN ORGANIZED HEALTH CARE EDUCATION/TRAINING PROGRAM
Payer: COMMERCIAL

## 2025-01-14 DIAGNOSIS — R45.851 SUICIDAL IDEATION: ICD-10-CM

## 2025-01-14 PROBLEM — F33.1 MAJOR DEPRESSIVE DISORDER, RECURRENT EPISODE, MODERATE (H): Status: ACTIVE | Noted: 2025-01-14

## 2025-01-14 PROBLEM — F19.10 POLYSUBSTANCE ABUSE (H): Status: ACTIVE | Noted: 2025-01-14

## 2025-01-14 PROCEDURE — 99285 EMERGENCY DEPT VISIT HI MDM: CPT

## 2025-01-14 RX ORDER — OLANZAPINE 5 MG/1
5 TABLET, ORALLY DISINTEGRATING ORAL 2 TIMES DAILY PRN
Status: DISCONTINUED | OUTPATIENT
Start: 2025-01-14 | End: 2025-01-17 | Stop reason: HOSPADM

## 2025-01-14 RX ORDER — HYDROXYZINE HYDROCHLORIDE 25 MG/1
25 TABLET, FILM COATED ORAL
Status: DISCONTINUED | OUTPATIENT
Start: 2025-01-14 | End: 2025-01-17 | Stop reason: HOSPADM

## 2025-01-14 RX ORDER — OLANZAPINE 10 MG/1
10 TABLET, ORALLY DISINTEGRATING ORAL 2 TIMES DAILY PRN
Status: DISCONTINUED | OUTPATIENT
Start: 2025-01-14 | End: 2025-01-15

## 2025-01-14 RX ORDER — HYDROXYZINE HYDROCHLORIDE 25 MG/1
25 TABLET, FILM COATED ORAL EVERY 4 HOURS PRN
Status: DISCONTINUED | OUTPATIENT
Start: 2025-01-14 | End: 2025-01-17 | Stop reason: HOSPADM

## 2025-01-14 RX ORDER — LORAZEPAM 2 MG/ML
2 INJECTION INTRAMUSCULAR ONCE
Status: COMPLETED | OUTPATIENT
Start: 2025-01-14 | End: 2025-01-14

## 2025-01-14 RX ORDER — HALOPERIDOL 5 MG/ML
5 INJECTION INTRAMUSCULAR ONCE
Status: COMPLETED | OUTPATIENT
Start: 2025-01-14 | End: 2025-01-14

## 2025-01-14 ASSESSMENT — ACTIVITIES OF DAILY LIVING (ADL)
ADLS_ACUITY_SCORE: 54

## 2025-01-14 ASSESSMENT — COLUMBIA-SUICIDE SEVERITY RATING SCALE - C-SSRS
6. HAVE YOU EVER DONE ANYTHING, STARTED TO DO ANYTHING, OR PREPARED TO DO ANYTHING TO END YOUR LIFE?: YES
4. HAVE YOU HAD THESE THOUGHTS AND HAD SOME INTENTION OF ACTING ON THEM?: NO
3. HAVE YOU BEEN THINKING ABOUT HOW YOU MIGHT KILL YOURSELF?: YES
2. HAVE YOU ACTUALLY HAD ANY THOUGHTS OF KILLING YOURSELF IN THE PAST MONTH?: YES
5. HAVE YOU STARTED TO WORK OUT OR WORKED OUT THE DETAILS OF HOW TO KILL YOURSELF? DO YOU INTEND TO CARRY OUT THIS PLAN?: YES
1. IN THE PAST MONTH, HAVE YOU WISHED YOU WERE DEAD OR WISHED YOU COULD GO TO SLEEP AND NOT WAKE UP?: YES

## 2025-01-14 NOTE — ED PROVIDER NOTES
EMERGENCY DEPARTMENT ENCOUNTER      NAME: Jody Barrow  AGE: 59 year old female  YOB: 1965  MRN: [id number]  EVALUATION DATE & TIME: 1/14/2025 12:46 PM    PCP: No Ref-Primary, Physician    ED PROVIDER: Sam Aguilar MD              FINAL IMPRESSION:  No diagnosis found.      ED COURSE & MEDICAL DECISION MAKING:    Pertinent Labs & Imaging studies reviewed. (See chart for details)  59 year old female presents to the Emergency Department for evaluation of  agitation, homicidal ideation    ED Course as of 01/14/25 1443   Tue Jan 14, 2025   1257 Patient is a 59-year-old female experiencing homelessness who presents to the emergency department for evaluation of a mental health crisis.  She was reportedly at [place] and police and paramedics were called she was agitated.  She endorsed to them that she is having feelings of hurting other people and has been for several weeks.  Initially denies any suicidal ideation but notes that she wants to harm herself after she is done harming everyone else.  Denies any specific medical complaints at this point in time he denies any chest pain or shortness of breath.  No abdominal pain nausea or vomiting.  Denies any fevers cough sore throat.    On exam here patient is a somewhat withdrawn with poor eye contact.  Borderline tachycardic but lungs are clear without any wheezes or rales.  Abdomen is soft and nontender.    Patient has no specific medical complaints at this point in time but does endorse a plan to harm other people by assaulting them with a bat.  Denies specific medical complaints at the gets reasonable to hold off on extensive medical testing.  Will have her evaluated by DEC to determine if she would benefit from inpatient psychiatric stabilization.   1441 Patient agitated when she was informed that we need to search her physicians and hold them while she is being evaluated in the emergency department.  Eventually we were able to verbally de-escalate and  patient allowed us to search her belongings.    She is declining initial screening lab evaluation without any specific medical complaints and this is reasonable.      Final disposition at this point in time is pending evaluation by DEC team.  Patient is signed out to a colleague of juventino to update documentation and treatment plan as needed.     12:49 PM I met and evaluated the patient. She is declining screening labs at this time.   1:10 PM Behavorial emergency response code called.       Medical Decision Making  Obtained supplemental history:Supplemental history obtained?: No  Reviewed external records: External records reviewed?: No  Care impacted by chronic illness:Documented in Chart  Care significantly affected by social determinants of health:Housing Insecurity  Did you consider but not order tests?: Work up considered but not performed and documented in chart, if applicable  Did you interpret images independently?: Independent interpretation of ECG and images noted in documentation, when applicable.  Consultation discussion with other provider:Did you involve another provider (consultant, , pharmacy, etc.)?: No  Admission considered. Patient was signed out to the oncoming physician, disposition pending.  Not Applicable          At the conclusion of the encounter I discussed the results of all of the tests and the disposition. The questions were answered. The patient or family acknowledged understanding and was agreeable with the care plan.     0 minutes of critical care time     MEDICATIONS GIVEN IN THE EMERGENCY:  Medications   haloperidol lactate (HALDOL) injection 5 mg (has no administration in time range)   LORazepam (ATIVAN) injection 2 mg (has no administration in time range)       NEW PRESCRIPTIONS STARTED AT TODAY'S ER VISIT  New Prescriptions    No medications on file          =================================================================    HPI    Patient information was obtained from: the  "patient    Use of : N/A         Jodymasood Barrow is a 59 year old female with no known pertinent history who presents to this ED by walking for evaluation of homicidal ideation.     Patient comes from [place] after she wanted them to call emergency services for her. She has been feeling severally stressed and wants to hurt everyone by beating them with a bat. Notably moved here a couple years ago due to spouse but patient is unhappy with living here. Patient just \"wants to go back to the hospital and be by myself\". Endorses history of half a pack of cigarettes a day as well as some alcohol use (2 shots) today due to the cold. Known pet hair allergy so patient carries an epi-pen with her. She confirms feeling suicidal secondary to her homicidal ideations. Notes history of asthma without an inhaler. Denies use of drugs.     She denies shortness of breath, cough, nausea and vomiting.    REVIEW OF SYSTEMS   Refer to the HPI    PAST MEDICAL HISTORY:  No past medical history on file.    PAST SURGICAL HISTORY:  No past surgical history on file.        CURRENT MEDICATIONS:    albuterol (PROAIR HFA/PROVENTIL HFA/VENTOLIN HFA) 108 (90 Base) MCG/ACT inhaler  EPINEPHrine (ANY BX GENERIC EQUIV) 0.3 MG/0.3ML injection 2-pack  gabapentin (NEURONTIN) 300 MG capsule  hydrOXYzine (ATARAX) 50 MG tablet  hydrOXYzine (ATARAX) 50 MG tablet  ibuprofen (ADVIL/MOTRIN) 600 MG tablet  Lidocaine (LIDOCARE) 4 % Patch  lidocaine (LMX4) 4 % external cream  melatonin 3 MG tablet  OLANZapine (ZYPREXA) 15 MG tablet  OLANZapine (ZYPREXA) 5 MG tablet  PARoxetine (PAXIL) 10 MG tablet  thiamine (B-1) 100 MG tablet        ALLERGIES:  Allergies   Allergen Reactions    Codeine Anaphylaxis    Sunflower Oil Swelling and Rash    Cat Dander Hives     All animal hair       FAMILY HISTORY:  No family history on file.    SOCIAL HISTORY:   Social History     Socioeconomic History    Marital status: Single       VITALS:  LMP  (LMP Unknown)     PHYSICAL " EXAM    Constitutional: Well developed, Well nourished, NAD,  HENT: Normocephalic, Atraumatic,  mucous membranes moist,   Neck- trachea midline, No stridor.   Eyes:EOMI, Conjunctiva normal, No discharge.  Respiratory: Normal breath sounds, No respiratory distress, No wheezing or rales. Normal work of breathing.    Cardiovascular: Borderline tachycardia, Regular rhythm, No murmurs.   Abdominal: Soft, No tenderness, No rebound or guarding.     Musculoskeletal: no deformity or malalignment   Integument: Warm, Dry, No erythema,    Neurologic: Alert & oriented x 3   Psychiatric: Poor eye contact. Thoughts of harming others by beating with a bat.  Does not seem to be responding to internal stimuli     LAB:  All pertinent labs reviewed and interpreted.       RADIOLOGY:  Reviewed all pertinent imaging. Please see official radiology report.  No orders to display       EKG:      PROCEDURES:         [facility] Documentation:   CMS Diagnoses:              I Regino Trungterrance, am serving as a scribe to document services personally performed by Sam Aguilar MD based on my observation and the provider's statements to me. I, Sam Aguilar MD, attest that Regino Carter is acting in a scribe capacity, has observed my performance of the services and has documented them in accordance with my direction.    Sam Aguilar MD  [facility] EMERGENCY DEPARTMENT  [facility] [address]  [phone]      Sam Aguilar MD  01/14/25 4423

## 2025-01-14 NOTE — CONSULTS
"Diagnostic Evaluation Consultation  Crisis Assessment    Patient Name: Jody Barrow  Age:  59 year old  Legal Sex: female  Gender Identity: female  Pronouns: she/her/hers  Race: White  Ethnicity: Not  or   Language: English      Patient was assessed: Virtual: [employer]   Crisis Assessment Start Date: 01/14/25  Crisis Assessment Start Time: 1527  Crisis Assessment Stop Time: 1544  Patient location: [facility] EMERGENCY DEPARTMENT                             JNED-07    Referral Data and Chief Complaint  Jody Barrow presents to the ED via EMS. Patient is presenting to the ED for the following concerns: Suicidal ideation, Substance use, Worsening psychosocial stress. Factors that make the mental health crisis life threatening or complex are: Pt presents to ED via EMS for concerns of altered mental status, agitation, and depressive and anxious symptoms. Pt is homeless, and per triage note, police were called to a [place] after bystanders became concerned about pt's behavior. She reported to police and paramedics that she was having thoughts of hurting other people and has been for several weeks. Upon assessment, pt is oriented to person and day of the week, but not to place or date. Pt was sitting on the floor of her room in the corner crying and asked  \"what am I doing here?\".  Pt spoke rapidly and mumbled, making it difficult to gather history. Pt also reported that she did not know the answers to several of writer's questions, including if she had any current services. She also reported she did not know what happened at [place] prior to coming in. Pt reports that for several weeks she has felt increasingly anxious, hopeless, and confused. She reports \"everything is overwhelming and nothing is going right\", but was unable to give writer specific examples. She endorses trouble falling asleep, racing thoughts, and decreased appetite. She endorses passive SI, " "specifically stating \"I sometimes wish I wasn't here, but I wouldn't do anything about it\". Pt does have a prior suicide attempt in 2022 and endorses a history of SIB. Pt reports that she is homeless and took two shots of alcohol earlier today to try and stay warm. She drinks daily. She endorses \"occasional\" meth use, with last reported use being 2-3 weeks ago. Pt repeatedly stated \"please help me shut  my brain off, I can't get it to stop\" and then would start crying. Pt denies HI, but endorses that she wants to hurt other people so they can know how much she is hurting. She denies AH/VH..      Informed Consent and Assessment Methods  Explained the crisis assessment process, including applicable information disclosures and limits to confidentiality, assessed understanding of the process, and obtained consent to proceed with the assessment.  Assessment methods included conducting a formal interview with patient, review of medical records, collaboration with medical staff, and obtaining relevant collateral information from family and community providers when available.  : done     History of the Crisis   Pt has diagnosis history of  generalized anxiety disorder, major depressive disorder ,alcohol abuse, methamphetamine. Pt has history of IP  admissions, most recent was in November 2022 after significant suicide attempt. Per [facility] pt was on MI/CD Commitment through [facility] until 11/23/22. Pt does not currently have any services.    Brief Psychosocial History  Family:  Single, Children no  Support System:  Limited to (pt denies any support system. Her family is in [place])  Employment Status:  unemployed  Source of Income:  none  Financial Environmental Concerns:  unable to afford rent/mortgage, unemployed  Current Hobbies:  arts/crafts, poetry reading/writing  Barriers in Personal Life:  behavioral concerns, mental health concerns    Significant Clinical History  Current Anxiety Symptoms:  racing thoughts, " anxious (trouble falling asleep)  Current Depression/Trauma:  difficulty concentrating, crying or feels like crying, irritable, hopelessness, helplessness  Current Somatic Symptoms:  racing thoughts, excessive worry, anxious  Current Psychosis/Thought Disturbance:  inattentive  Current Eating Symptoms:  loss of appetite  Chemical Use History:  Alcohol: Daily  Last Use:: 01/14/25  Benzodiazepines: None  Opiates: None  Cocaine: None  Marijuana: None  Other Use: Methamphetamines  Last Use:: 12/31/24   Past diagnosis:  Anxiety Disorder, Depression, Substance Use Disorder, Suicide attempt(s)  Family history:  No known history of mental health or chemical health concerns  Past treatment:  Case management, Civil Commitment, Psychiatric Medication Management, Inpatient Hospitalization  Details of most recent treatment:  After pt was hospitalized in 2022, she did not follow through with any services.  Other relevant history:  Pt is not being treated for any medical conditions. Pt denies  history. Pt endorses trauma history. No legal history per [facility], however pt has only been in [place] since 2022. She grew up in [place].    Have there been any medication changes in the past two weeks:  no       Is the patient compliant with medications:  no  Per chart review, pt is prescribed medicaitons. However, she reports that she is not and is not currently taking any     Collateral Information  Is there collateral information: No (pt reported that she has no contacts here in [place], and her family in [place] no longer speaks to her)        Risk Assessment  [place] Suicide Severity Rating Scale Full Clinical Version:  Suicidal Ideation  Q1 Wish to be Dead (Lifetime): Yes  Q2 Non-Specific Active Suicidal Thoughts (Lifetime): Yes  3. Active Suicidal Ideation with any Methods (Not Plan) Without Intent to Act (Lifetime): No  Q4 Active Suicidal Ideation with Some Intent to Act, Without Specific Plan (Lifetime): No  Q5 Active  Suicidal Ideation with Specific Plan and Intent (Lifetime): Yes  Q6 Suicide Behavior (Lifetime): yes  Intensity of Ideation (Lifetime)  Most Severe Ideation Rating (Lifetime): 5  Frequency (Lifetime): Daily or almost daily  Duration (Lifetime): 1-4 hours/a lot of time  Controllability (Lifetime): Can control thoughts with some difficulty  Deterrents (Lifetime): Uncertain that deterrents stopped you  Reasons for Ideation (Lifetime): Mostly to end or stop the pain (You couldn't go on living with the pain or how you were feeling)  Suicidal Behavior (Lifetime)  Actual Attempt (Lifetime): Yes  Total Number of Actual Attempts (Lifetime): 1  Has subject engaged in non-suicidal self-injurious behavior? (Lifetime): Yes  Interrupted Attempts (Lifetime): No  Aborted or Self-Interrupted Attempt (Lifetime): No  Preparatory Acts or Behavior (Lifetime): No    [place] Suicide Severity Rating Scale Recent:   Suicidal Ideation (Recent)  Q1 Wished to be Dead (Past Month): yes  Q2 Suicidal Thoughts (Past Month): yes  Q3 Suicidal Thought Method: yes  Q4 Suicidal Intent without Specific Plan: no  Q5 Suicide Intent with Specific Plan: yes  If yes to Q6, within past 3 months?: no  Level of Risk per Screen: high risk  Intensity of Ideation (Recent)  Most Severe Ideation Rating (Past 1 Month): 3  Frequency (Past 1 Month): Daily or almost daily  Duration (Past 1 Month): Less than 1 hour/some of the time  Controllability (Past 1 Month): Can control thoughts with some difficulty  Deterrents (Past 1 Month): Uncertain that deterrents stopped you  Reasons for Ideation (Past 1 Month): Completely to end or stop the pain (You couldn't go on living with the pain or how you were feeling)  Suicidal Behavior (Recent)  Actual Attempt (Past 3 Months): No  Has subject engaged in non-suicidal self-injurious behavior? (Past 3 Months): No  Interrupted Attempts (Past 3 Months): No  Aborted or Self-Interrupted Attempt (Past 3 Months): No  Preparatory Acts or  "Behavior (Past 3 Months): No    Environmental or Psychosocial Events: unstable housing, homelessness, geographic isolation from supports, loss of a relationship due to divorce/separation, unemployment/underemployment, ongoing abuse of substances, helplessness/hopelessness, social isolation (recently seperated from her signifigant other)  Protective Factors: Protective Factors: help seeking, able to access care without barriers    Does the patient have thoughts of harming others? Feels Like Hurting Others: yes  Previous Attempt to Hurt Others: no  Violence Threats in Past 6 Months: none  Current Violence Plan or Thoughts: \"I want to hurt people so they know how I feel\". Pt denies plan or intent, and denies wanting to hurt a specific person. This has been communicated to ED staff  Is the patient engaging in sexually inappropriate behavior?: no  Duty to warn initiated: no  Does Patient have a known history of aggressive behavior: No  Has aggression occurred as a result of MH concerns/diagnosis: uncertain  Does patient have history of aggression in hospital: pt denies, and none per chart review    Is the patient engaging in sexually inappropriate behavior?  no        Mental Status Exam   Affect: Dramatic  Appearance: Disheveled  Attention Span/Concentration: Attentive  Eye Contact: Variable    Fund of Knowledge: Delayed   Language /Speech Content: Fluent  Language /Speech Volume: Soft  Language /Speech Rate/Productions: Pressured  Recent Memory: Poor  Remote Memory: Poor  Mood: Anxious, Sad  Orientation to Person: Yes   Orientation to Place: No  Orientation to Time of Day: No  Orientation to Date: No     Situation (Do they understand why they are here?): No  Psychomotor Behavior: Agitated  Thought Content: Clear  Thought Form:  (blocking)     Mini-Cog Assessment  Number of Words Recalled:    Clock-Drawing Test:     Three Item Recall:    Mini-Cog Total Score:       Medication  Psychotropic medications:   Medication " Orders - Psychiatric (From admission, onward)      Start     Dose/Rate Route Frequency Ordered Stop    01/14/25 1551  OLANZapine zydis (zyPREXA) ODT tab 5 mg         5 mg Oral 2 TIMES DAILY PRN 01/14/25 1551               Current Care Team  Patient Care Team:  No Ref-Primary, Physician as PCP - General    Diagnosis  Patient Active Problem List   Diagnosis Code    Homeless single person Z59.00    Injury of right knee S89.91XA    Ulcer of esophagus with bleeding K22.11    Major depressive disorder, recurrent episode, moderate (H) F33.1    Polysubstance abuse (H) F19.10       Primary Problem This Admission  Active Hospital Problems    *Major depressive disorder, recurrent episode, moderate (H)      Polysubstance abuse (H)        Clinical Summary and Substantiation of Recommendations   Clinical Substantiation:  It is the recommendation of this clinician that pt admit to [facility] for safety and stabilization. Pt presents to ED via EMS for concerns of altered mental status, agitation, and depressive and anxious symptoms. Pt endorses following risk factors that make inpatient level of care appropriate: not oriented to place, date, situation and cannot remember why she was brought to ED, lack of support system, no outpatient services in place, generalized thoughts of harm to others,  and was unable to safety plan. Pt is in agreement with plan. Pt has been placed on IP  waitlist, and psychiatry consult will be placed to begin active treatment while pt boards in ED.    Goals for crisis stabilization:  medication management, resource coordination    Next steps for Care Team:  psych consult placed    Treatment Objectives Addressed:  rapport building, orienting the patient to therapy, assessing safety, exploring obstacles to safety in the community    Therapeutic Interventions:       Has a specific means been identified for suicidal/homicide actions: No    Patient coping skills attempted to reduce the crisis:  asked for  prn    Disposition  Recommended referrals:  (case management)        Reviewed case and recommendations with attending provider. Attending Name: Dr. Reddy       Attending concurs with disposition: yes       Patient and/or validated legal guardian concurs with disposition:   yes       Final disposition:  inpatient mental health            Legal status: Voluntary/Patient has signed consent for treatment                                                                                                                                 Reviewed court records: yes       Assessment Details   Total duration spent with the patient: 17 min     CPT code(s) utilized: 64639 - Psychotherapy (with patient) - 30 (16-37*) min    LIO Pastor, Psychotherapist  DEC - Triage & Transition Services  Callback: [phone]    LIO Pastor on 1/14/2025 at 4:44 PM

## 2025-01-14 NOTE — PLAN OF CARE
Jody Barrow  January 14, 2025  Plan of Care Hand-off Note     Patient Recommended Care Path: inpatient mental health    Clinical Substantiation:  It is the recommendation of this clinician that pt admit to [facility] for safety and stabilization. Pt presents to ED via EMS for concerns of altered mental status, agitation, and depressive and anxious symptoms. Pt endorses following risk factors that make inpatient level of care appropriate: not oriented to place, date, situation and cannot remember why she was brought to ED, lack of support system, no outpatient services in place, generalized thoughts of harm to others,  and was unable to safety plan. Pt is in agreement with plan. Pt has been placed on [facility] waitlist, and psychiatry consult will be placed to begin active treatment while pt boards in ED.    Goals for crisis stabilization:  medication management, resource coordination    Next steps for Care Team:  psych consult placed    Treatment Objectives Addressed:  rapport building, orienting the patient to therapy, assessing safety, exploring obstacles to safety in the community    Therapeutic Interventions:       Has a specific means been identified for suicidal.homicide actions: No  If yes, describe:    Explain action steps toward mitigation:    Document completion of mitigation action:    The follow up action still needed prior to discharge:      Patient coping skills attempted to reduce the crisis:  asked for prn               Collateral contact information:   none    Legal Status: Voluntary/Patient has signed consent for treatment                                                                                                                                 Reviewed court records: yes     Psychiatry Consult: Patient has Psychiatry Consult Order    LIO Pastor LICSW on 1/14/2025 at 4:46 PM

## 2025-01-14 NOTE — ED NOTES
Bed: JNED-07  Expected date: 1/14/25  Expected time: 12:39 PM  Means of arrival:   Comments:  WBL 58 yo F  crisis

## 2025-01-14 NOTE — ED NOTES
IP MH Referral Acuity Rating Score (RARS)    LMHP complete at referral to IP MH, with DEC; and, daily while awaiting IP MH placement. Call score to PPS.  CRITERIA SCORING   New 74 HH and Involuntary for IP MH (not adolescent) 0/1   Boarding over 24 hours 0/1   Vulnerable adult at least 55+ with multiple co morbidities; or, Patient age 11 or under 0/1   Suicide ideation without relief of precipitating factors 0/1   Current plan for suicide 0/1   Current plan for homicide 0/1   Imminent risk or actual attempt to seriously harm another without relief of factors precipitating the attempt 0/1   Severe dysfunction in daily living (ex: complete neglect for self care, extreme disruption in vegetative function, extreme deterioration in social interactions) 1/1   Recent (last 2 weeks) or current physical aggression in the ED 0/1   Restraints or seclusion episode in ED 0/1   Verbal aggression, agitation, yelling, etc., while in the ED 0/1   Active psychosis with psychomotor agitation or catatonia 0/1   Need for constant or near constant redirection (from leaving, from others, etc).  0/1   Intrusive or disruptive behaviors 1/1   TOTAL Acuity Total Score: 2    LIO Pastor on 1/14/2025 at 4:14 PM

## 2025-01-14 NOTE — TELEPHONE ENCOUNTER
R: [place]  Access Inpatient Bed Call Log 1/14/2025 @3:10 PM  Intake has called facilities that have not updated the bed status within the last 12 hours.         (Adults);                [facility] is posting 0 beds.             [facility] is posting 8 beds. [phone]: Per Flora @ 5:03 PM, no beds tonight.  [facility] is posting 0 beds. Negative covid required.   [facility] is posting 0 beds. Neg covid. No high school/Stephany-psych. [phone] 8:12 AM PER DERRELL, LOW ACUITY ONLY.  [facility] is posting 0 beds. [phone]   [facility] is posting 0 bed. [phone]    [facility] is posting 0 beds. (Ages 18-35) Negative covid. [phone] 8:13AM FEW ADOL, NO MARLIN, NO CHILD AND NO YA.  [facility] is posting 0 beds.    [facility] is posting 0 beds [phone]       Pt remains on the work list pending appropriate bed availability.

## 2025-01-14 NOTE — ED PROVIDER NOTES
[facility] EMERGENCY DEPARTMENT   ED Mental Health Observation - Initiation Note    Jody Barrow was placed into observation at 3:50 PM on 1/14/2025 for Mental health crisis.   Patient is expected to be under observation status for a minimum of eight hours.    I spoke with the DEC . patient will need admission for psychiatric reasons. Patient is holdable because she is not able to safety plan.     MD Maureen Loomis, Freida Rodriguez MD  01/14/25 1557

## 2025-01-14 NOTE — ED TRIAGE NOTES
"Patient arrives via EMS for evaluation of homicidal ideation and suicidal ideation. Per EMS report patient was at a [place] and asked staff to call 911 because she was wanting to hurt herself and others. Upon arrival patient not forthcoming in communication with ED RN and MD. Patient did end up informing us that she has been feeling homicidal towards others, when asked who she wants to hurt she stated \"everyone around me\" when asked what she wanted to do she said \"beat them to death with a baseball bat.\" Patient then said that after harming someone she would then kill herself, when asked how she said \"I don't know, just off of a bridge or something, take some pills, cut myself.\"         "

## 2025-01-14 NOTE — DISCHARGE INSTRUCTIONS
You have been referred to the [facility]. This outpatient service provides short-term Medication Management until you begin scheduled services with long-term care providers. The Transition Clinic is located at [address] [place] [address], [address], [place] [address]. If you need to contact the Transition Clinic, please call [phone].     In a crisis? Get help 24 hours a day, 7 days a week  [facility] s mobile crisis team provides stabilization services, de-escalation, crisis intervention, mental health assessments and initial crisis plans.    Walk-in crisis services are available Monday-Friday from 8 a.m.-7 p.m. and Saturdays & Sundays 10 a.m.-5 p.m. at the Mental Health Urgent Care.    Under 18 years old? Call the Children's Mental Health Crisis Line: [phone].    Over 18 years old? Call the Adult Mental Health Crisis Line: [phone].    Mental health services  No two people take the same path to recovery and wellness. [facility] can help you find resources and support to live an independent, full and meaningful life in your community.     [facility]'s Certified Community Behavioral Health Clinics provide comprehensive behavioral health services, both directly and through contracts with private agencies. People who have insurance, public health care, such as medical assistance, or are without insurance can find help.    We provide connections to services and resources that will put you on a path to healing. Contact us for help in crisis, to get an assessment, or to access ongoing mental health or chemical health services.     24/7 crisis services  If you or someone you know is in a mental health crisis, call the 24/7/365 crisis line at [phone].    Walk-in crisis services are available Monday-Friday from 8 a.m.-7 p.m. and Saturdays & Sundays 10 a.m-5 p.m. at the Mental Health Urgent Care.    For Crisis & Stabilization services, we will bill your insurance to cover the cost.  If the service is not fully covered by your insurance, or you are uninsured, you will not have to pay any remaining cost.    Mental Health Center  [facility] is located at [address] [place] [address] [address], [place] and offers short-term and on-going mental health services. The [facility] is open Monday-Friday 8 a.m. - 4:30 p.m., and closed weekends and county holidays.    Welcome Center  [facility] offers walk-in, short-term and referral services for individuals affected by mental illness or substance abuse issues. Staff provide intake services to help people access mental health and chemical addiction services provided by the Pending sale to [facility] and other community organizations.    To make an appointment at the [facility], call: [phone] (Walk-ins are welcome.)    [facility] also provides the following on-going services at the [facility]:    Individual and group therapy.  Psychiatry.  Dual chemical and mental health programs. (CORe)  [facility]: Chemical health support for pregnant women and mothers.  Outpatient services.  Partial hospitalization (short-term intensive help for people who may otherwise be hospitalized).  Supported employment to help people get meaningful jobs.  Adult mental health case management referral - download a statement of need form (PDF) and fax it to [phone] or call [phone]).  Fees  Service fees at the [facility] are charged on a sliding scale based on family size and income.
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
## 2025-01-15 ENCOUNTER — TELEPHONE (OUTPATIENT)
Dept: BEHAVIORAL HEALTH | Facility: CLINIC | Age: 60
End: 2025-01-15
Payer: COMMERCIAL

## 2025-01-15 LAB
ALBUMIN SERPL BCG-MCNC: 4.3 G/DL (ref 3.5–5.2)
ALP SERPL-CCNC: 83 U/L (ref 40–150)
ALT SERPL W P-5'-P-CCNC: 61 U/L (ref 0–50)
AMPHETAMINES UR QL SCN: ABNORMAL
ANION GAP SERPL CALCULATED.3IONS-SCNC: 9 MMOL/L (ref 7–15)
AST SERPL W P-5'-P-CCNC: 49 U/L (ref 0–45)
BARBITURATES UR QL SCN: ABNORMAL
BASOPHILS # BLD AUTO: 0.1 10E3/UL (ref 0–0.2)
BASOPHILS NFR BLD AUTO: 2 %
BENZODIAZ UR QL SCN: ABNORMAL
BILIRUB SERPL-MCNC: 0.7 MG/DL
BUN SERPL-MCNC: 18.1 MG/DL (ref 6–20)
BZE UR QL SCN: ABNORMAL
CALCIUM SERPL-MCNC: 10 MG/DL (ref 8.8–10.4)
CANNABINOIDS UR QL SCN: ABNORMAL
CHLORIDE SERPL-SCNC: 102 MMOL/L (ref 98–107)
CREAT SERPL-MCNC: 0.72 MG/DL (ref 0.51–0.95)
EGFRCR SERPLBLD CKD-EPI 2021: >90 ML/MIN/1.73M2
EOSINOPHIL # BLD AUTO: 0.1 10E3/UL (ref 0–0.7)
EOSINOPHIL NFR BLD AUTO: 2 %
ERYTHROCYTE [DISTWIDTH] IN BLOOD BY AUTOMATED COUNT: 11.9 % (ref 10–15)
ETHANOL SERPL-MCNC: <0.01 G/DL
FENTANYL UR QL: ABNORMAL
FLUAV RNA SPEC QL NAA+PROBE: NEGATIVE
FLUBV RNA RESP QL NAA+PROBE: NEGATIVE
GLUCOSE SERPL-MCNC: 111 MG/DL (ref 70–99)
HCO3 SERPL-SCNC: 32 MMOL/L (ref 22–29)
HCT VFR BLD AUTO: 46.7 % (ref 35–47)
HGB BLD-MCNC: 16 G/DL (ref 11.7–15.7)
HOLD SPECIMEN: NORMAL
IMM GRANULOCYTES # BLD: 0 10E3/UL
IMM GRANULOCYTES NFR BLD: 0 %
LYMPHOCYTES # BLD AUTO: 1.9 10E3/UL (ref 0.8–5.3)
LYMPHOCYTES NFR BLD AUTO: 42 %
MCH RBC QN AUTO: 32.9 PG (ref 26.5–33)
MCHC RBC AUTO-ENTMCNC: 34.3 G/DL (ref 31.5–36.5)
MCV RBC AUTO: 96 FL (ref 78–100)
MONOCYTES # BLD AUTO: 0.5 10E3/UL (ref 0–1.3)
MONOCYTES NFR BLD AUTO: 12 %
NEUTROPHILS # BLD AUTO: 1.9 10E3/UL (ref 1.6–8.3)
NEUTROPHILS NFR BLD AUTO: 42 %
NRBC # BLD AUTO: 0 10E3/UL
NRBC BLD AUTO-RTO: 0 /100
OPIATES UR QL SCN: ABNORMAL
PCP QUAL URINE (ROCHE): ABNORMAL
PLATELET # BLD AUTO: 338 10E3/UL (ref 150–450)
POTASSIUM SERPL-SCNC: 4.8 MMOL/L (ref 3.4–5.3)
PROT SERPL-MCNC: 7.2 G/DL (ref 6.4–8.3)
RBC # BLD AUTO: 4.86 10E6/UL (ref 3.8–5.2)
RSV RNA SPEC NAA+PROBE: NEGATIVE
SALICYLATES SERPL-MCNC: <0.3 MG/DL
SARS-COV-2 RNA RESP QL NAA+PROBE: NEGATIVE
SODIUM SERPL-SCNC: 143 MMOL/L (ref 135–145)
WBC # BLD AUTO: 4.6 10E3/UL (ref 4–11)

## 2025-01-15 PROCEDURE — 85025 COMPLETE CBC W/AUTO DIFF WBC: CPT | Performed by: EMERGENCY MEDICINE

## 2025-01-15 PROCEDURE — 250N000013 HC RX MED GY IP 250 OP 250 PS 637: Performed by: REGISTERED NURSE

## 2025-01-15 PROCEDURE — 80053 COMPREHEN METABOLIC PANEL: CPT | Performed by: EMERGENCY MEDICINE

## 2025-01-15 PROCEDURE — 99245 OFF/OP CONSLTJ NEW/EST HI 55: CPT | Performed by: REGISTERED NURSE

## 2025-01-15 PROCEDURE — 36415 COLL VENOUS BLD VENIPUNCTURE: CPT | Performed by: EMERGENCY MEDICINE

## 2025-01-15 PROCEDURE — 80179 DRUG ASSAY SALICYLATE: CPT | Performed by: EMERGENCY MEDICINE

## 2025-01-15 PROCEDURE — 93005 ELECTROCARDIOGRAM TRACING: CPT | Performed by: EMERGENCY MEDICINE

## 2025-01-15 PROCEDURE — 80307 DRUG TEST PRSMV CHEM ANLYZR: CPT | Performed by: EMERGENCY MEDICINE

## 2025-01-15 PROCEDURE — 87637 SARSCOV2&INF A&B&RSV AMP PRB: CPT | Performed by: EMERGENCY MEDICINE

## 2025-01-15 PROCEDURE — 250N000013 HC RX MED GY IP 250 OP 250 PS 637: Performed by: EMERGENCY MEDICINE

## 2025-01-15 PROCEDURE — 82077 ASSAY SPEC XCP UR&BREATH IA: CPT | Performed by: EMERGENCY MEDICINE

## 2025-01-15 RX ORDER — OLANZAPINE 10 MG/1
10 TABLET, ORALLY DISINTEGRATING ORAL 2 TIMES DAILY
Status: DISCONTINUED | OUTPATIENT
Start: 2025-01-15 | End: 2025-01-16

## 2025-01-15 RX ADMIN — HYDROXYZINE HYDROCHLORIDE 25 MG: 25 TABLET ORAL at 11:10

## 2025-01-15 RX ADMIN — OLANZAPINE 10 MG: 10 TABLET, ORALLY DISINTEGRATING ORAL at 19:49

## 2025-01-15 RX ADMIN — OLANZAPINE 10 MG: 10 TABLET, ORALLY DISINTEGRATING ORAL at 12:44

## 2025-01-15 ASSESSMENT — ACTIVITIES OF DAILY LIVING (ADL)
ADLS_ACUITY_SCORE: 54

## 2025-01-15 NOTE — ED NOTES
Patient agreeable to blood draw and Covid swab.  Patient informed that we still need urine.  She is agreeable at this time to give us one when she has to urinate.

## 2025-01-15 NOTE — TELEPHONE ENCOUNTER
[place]: [place]  Access Inpatient Bed Call Log  1/14/2025 11:46 PM  Intake has called facilities that have not updated their bed status within the last 12 hours.    *[facility]:  [place] -- [facility]: @ Capacity.  [facility]: @ Capacity. Reporting no reviews overnight. - 11:49 PM No answer.   [place] -- [facility]: @ Capacity. Low acuity  [place] -- [facility]: @ Capacity. Low acuity only - 11:49 PM Per Erik, they have low acuity.     [place] -- [facility]: @ Capacity.  [facility]: @ Capacity.  [facility]/ beds: @ Capacity. Ages 18-35, Voluntary only, NO aggression/physical/sexual assault, violence hx or drug abuse, or psychosis. Negative Covid   [place] -- [facility]: @ Capacity.  [place] -- [facility]: @ Capacity.  [place] -- [facility]: @ Capacity. Do not review overnight.    Pt remains on waitlist pending appropriate placement availability.    1:34 AM Intake called [facility]. Per Evy, they can review     1:42 AM Intake called [place] ED and requested CMP, CBC, Salicylate, alcohol level, Covid, UDS, and EKG.
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
## 2025-01-15 NOTE — ED NOTES
Spoke to Emily from DEC, patient is on pending inpatient list. Emily will have patient intake contacted about getting an inpatient bed and someone will call with update when available.

## 2025-01-15 NOTE — ED PROVIDER NOTES
[facility] EMERGENCY DEPARTMENT   ED Mental Health Observation - Daily Note for 1/15/2025    Jody Barrow is a 59 year old female currently boarding in the ED while awaiting placement for Mental health crisis.  Please see the initial H&P for this patient's presentation, workup, and disposition plan.     Hold Status:  Patient is Voluntary, but holdable    Plan:  In brief, the patient's presentation is notable for suicidal ideation and homicidal ideation.  The patient also has a history of polysubstance abuse.  Patient is awaiting Mental health placement    Interim History:  There were no significant events since last note.    Physical Exam:  /78 (BP Location: Right arm)   Pulse 94   Temp 98.2  F (36.8  C) (Oral)   Resp 18   LMP  (LMP Unknown)   SpO2 97%   No respiratory distress, on room air   Well perfused  Behavior appropriate    Medications provided prior to my care:  Medications   OLANZapine zydis (zyPREXA) ODT tab 5 mg (has no administration in time range)   hydrOXYzine HCl (ATARAX) tablet 25 mg (25 mg Oral $Given 1/15/25 1110)   melatonin tablet 3 mg (has no administration in time range)   hydrOXYzine HCl (ATARAX) tablet 25 mg (has no administration in time range)   OLANZapine zydis (zyPREXA) ODT tab 10 mg (10 mg Oral $Given 1/15/25 1244)   haloperidol lactate (HALDOL) injection 5 mg (5 mg Intramuscular Not Given 1/14/25 1642)   LORazepam (ATIVAN) injection 2 mg (2 mg Intramuscular Not Given 1/14/25 1643)       Laboratory (reviewed and interpreted):  Labs Ordered and Resulted from Time of ED Arrival to Time of ED Departure   COMPREHENSIVE METABOLIC PANEL - Abnormal       Result Value    Sodium 143      Potassium 4.8      Carbon Dioxide (CO2) 32 (*)     Anion Gap 9      Urea Nitrogen 18.1      Creatinine 0.72      GFR Estimate >90      Calcium 10.0      Chloride 102      Glucose 111 (*)     Alkaline Phosphatase 83      AST 49 (*)     ALT 61 (*)     Protein Total 7.2       Albumin 4.3      Bilirubin Total 0.7     CBC WITH PLATELETS AND DIFFERENTIAL - Abnormal    WBC Count 4.6      RBC Count 4.86      Hemoglobin 16.0 (*)     Hematocrit 46.7      MCV 96      MCH 32.9      MCHC 34.3      RDW 11.9      Platelet Count 338      % Neutrophils 42      % Lymphocytes 42      % Monocytes 12      % Eosinophils 2      % Basophils 2      % Immature Granulocytes 0      NRBCs per 100 WBC 0      Absolute Neutrophils 1.9      Absolute Lymphocytes 1.9      Absolute Monocytes 0.5      Absolute Eosinophils 0.1      Absolute Basophils 0.1      Absolute Immature Granulocytes 0.0      Absolute NRBCs 0.0     URINE DRUG SCREEN PANEL - Abnormal    Amphetamines Urine Screen Positive (*)     Barbituates Urine Screen Negative      Benzodiazepine Urine Screen Negative      Cannabinoids Urine Screen Negative      Cocaine Urine Screen Negative      Fentanyl Qual Urine Screen Negative      Opiates Urine Screen Negative      PCP Urine Screen Negative     SALICYLATE LEVEL - Normal    Salicylate <0.3     ETHYL ALCOHOL LEVEL - Normal    Alcohol ethyl <0.01     INFLUENZA A/B, RSV AND SARS-COV2 PCR - Normal    Influenza A PCR Negative      Influenza B PCR Negative      RSV PCR Negative      SARS CoV2 PCR Negative       EKG  Normal sinus rhythm.  Rate of 90.  Normal QRS.  Normal QT.  Nonspecific T wave changes.  No previous EKG available for comparison.    I have independently reviewed and interpreted the EKG results.    ED Course:  The patient's laboratory tests were reassuring.  The patient's urine drug screen was positive for amphetamines.      The patient was reevaluated by the DEC  today.  The DEC  states that the patient still likely needs inpatient admission.  However, if the patient states that she is no longer suicidal or homicidal she can be discharged home.    Impression/Plan:  No diagnosis found.    DO Jesi Kothari Jaremy Dale, DO  01/15/25 9558

## 2025-01-15 NOTE — PROGRESS NOTES
Writer asked pt if we could change her to mental health scrub, pt refused to change. Pt did let writer to do vitals.

## 2025-01-15 NOTE — PROGRESS NOTES
Pt finished eating at 1845. Writer asked pt if she needs to use restroom or need to drink anything. Pt declined.

## 2025-01-15 NOTE — ED NOTES
Spoke to Jennifer from DEC, no bed assigned yet but patient is on list. Will call when bed is available.

## 2025-01-15 NOTE — TELEPHONE ENCOUNTER
R:    Pt currently under review @  [facility]. Awaiting labs and EKG.     12:04 PM Labs faxed to [facility] for review. Still awaiting UDS and EKG results.   3:06 PM UDS faxed to [facility].   3:50 PM Per call to , they have not received faxes. Requested faxes be sent to [phone].   4:03 PM Full clinical re-faxed to [facility] for review.   5:16 PM Confirmed  has received clinical that was faxed, per CUAUHTEMOC Kong.   5:59 PM Writer informed that pt has been declined by [facility] d/t being too acute for their unit.       [facility] Access Inpatient Bed Call Log 1/15/2025 @ 3:30 PM:   Intake has called facilities that have not updated the bed status within the last 12 hours.          (Adults);               [facility] is posting 0 beds.              [facility] is posting 8 beds. [phone]: Per call at 7:55am to [facility] CRN at [facility].   [facility] is posting 0 beds. Negative covid required.    [facility] is posting 0 beds. Neg covid. No high school/Stephany-psych. [phone]. Per call at 7:57am to [facility] to  after 9:30am for updated bed availability.   [facility] is posting 0 beds. [phone]    [facility] is posting 0 bed. [phone]     [facility] is posting 2 beds. (Ages 18-35) Negative covid. [phone]  Pt not age appropriate.   [facility] is posting 0 beds.     [facility] is posting 0 beds [phone]         Pt remains on the work list pending appropriate bed availability.

## 2025-01-15 NOTE — ED NOTES
"ED Behavioral Health Patient Transition of Care Note      Brief behavioral history: Patient arrives via EMS for evaluation of homicidal ideation and suicidal ideation. Per EMS report patient was at a [place] and asked staff to call 911 because she was wanting to hurt herself and others. Upon arrival patient not forthcoming in communication with ED RN and MD. Patient did end up informing us that she has been feeling homicidal towards others, when asked who she wants to hurt she stated \"everyone around me\" when asked what she wanted to do she said \"beat them to death with a baseball bat.\" Patient then said that after harming someone she would then kill herself, when asked how she said \"I don't know, just off of a bridge or something, take some pills, cut myself.\" Pt ambulated to bathroom independently, staff told patient that a urine sample was needed. Patient refused to provide sample stating, \"Leave me alone, I'm not doing that. I just want to sleep.\" Patient refusing to give urine sample, allow staff to collect labs, get new vital signs or perform EKG. Pt reports she \"just wants to sleep\".     Any outstanding medical concerns: Per behavioral intake, patient needs CBC, CMP, UDS, COVID, EKG, salicylate level, and alcohol level to be placed. Intake is currently looking at [facility] for placement.      Has SW seen the patient:  yes    Is the patient on a hold:   not currently on a hold, but holdable    Current plan for disposition:  SW attempting to find a bed    Safety concerns:   pt is homicidal     Dietary restrictions:  None, pt can eat and drink ad sunitha    Significant events during shift: patient refused for staff to give urine sample, allow staff to collect labs, get new vital signs or perform EKG               "

## 2025-01-15 NOTE — ED NOTES
"Patient states she has been having itching, states that she gets that way when she gets worked up.  Patient is calm.  Offered change of clothes and shower patient declines.  When writer asked if there was anything else she needed patient states \"a new life\"   "

## 2025-01-15 NOTE — ED NOTES
Patient in bed now, offered to move bed where she was sleeping on floor and agreed. Pt resting gave warm blankets

## 2025-01-15 NOTE — PROGRESS NOTES
"Asked patient what she likes to do, she said that she enjoys writing. Offered her a crayon and piece of paper but she refused saying \"I can't write with that.\" Also offered to turn on the TV for her, she said I dont watch tv. Provided her some snacks.   "

## 2025-01-15 NOTE — PROGRESS NOTES
"Triage & Transition Services, Extended Care     Therapy Progress Note    Patient: Leta goes by \"Leta,\" uses she/her pronouns  Date of Service: January 15, 2025  Site of Service: [facility] EMERGENCY DEPARTMENT                             [id number]  Patient was seen yes  Mode of Assessment: Virtual: AmWell    Presentation Summary: Patient is awake, lying in hospital bed throughout video visit.  Reports feeling horrible, feeling overwhelmed, stressed, hopeless.  Patient reports she and her significant other have been living in their van which has been modified as a small camper.  Have continued to struggle to find places to park in order to sleep, often are being informed by law enforcement and others that they cannot stay in designated spots.  Expresses frustration at being unable to get help, expresses strong desire to have someone to talk to, get additional resources.  Patient reports coming from [place] and finding [place] challenging especially with regards to how people treat others.  Specifically talks about if someone does not like her they tend to make a point of making sure everyone else knows about it.  Patient is quite tearful, states that she would not return to [place] as she has no one there, reports her family has all .  Feels disconnected, alone, depressed.  Reports passive suicidal ideation however states that she would never act, does continue to endorse homicidal ideation specific to wanting other people to feel what is like to hurt however does not have plan or intent.    Therapeutic Intervention(s) Provided: Engaged in cognitive restructuring/ reframing, looked at common cognitive distortions and challenged negative thoughts., Engaged in guided discovery, explored patient's perspectives and helped expand them through socratic dialogue.    Current Symptoms: racing thoughts, excessive worry, anxious avoidance, low self esteem, impaired decision making, irritable, " hoplessness, helplessness, crying or feels like crying, negativistic, excessive guilt excessive worry, racing thoughts, anxious        Mental Status Exam   Affect: Appropriate  Appearance: Disheveled  Attention Span/Concentration: Attentive  Eye Contact: Variable    Fund of Knowledge: Appropriate   Language /Speech Content: Fluent  Language /Speech Volume: Soft  Language /Speech Rate/Productions: Pressured  Recent Memory: Variable  Remote Memory: Variable  Mood: Anxious, Depressed, Irritable, Sad  Orientation to Person: Yes   Orientation to Place: Yes  Orientation to Time of Day: Yes  Orientation to Date: Yes     Situation (Do they understand why they are here?): Yes  Psychomotor Behavior: Normal  Thought Content: Clear  Thought Form: Intact    Treatment Objective(s) Addressed: rapport building, processing feelings, assessing safety, building self-esteem, exploring obstacles to safety in the community    Patient Response to Interventions: acceptance expressed, verbalizes understanding    Progress Towards Goals: Patient Reports Symptoms Are: ongoing  Patient Progress Toward Goals: is making progress  Comment: Patient wants to get help, wants someone to talk to,  Next Step to Work Toward Discharge: symptom stabilization  Symptom Stabilization Comment: Inpatient hospitalization    Case Management:      Plan: inpatient mental health  yes provider, RN Dr. Dennison, RN Alisa, JOLIE Moya  yes    Clinical Substantiation: Recommendation remains for inpatient hospitalization for safety, stabilization, and further assessment.  Patient initially presented to the ED via EMS for concerns of altered mental status, agitation, depression, and anxiety.  Patient does not feel safe, has passive suicidal ideation, continues to experience high levels of anxiety and fear.  Due to the acute risk of harm to self inpatient hospitalization is needed for safety and stabilization.  Patient was seen by JOLIE Moya who also supports  inpatient placement.    Legal Status: Legal Status: Voluntary/Patient has signed consent for treatment    Session Status: Time session started: 1314  Time session ended: 1339  Session Duration (minutes): 25 minutes  Session Number: 1  Anticipated number of sessions or this episode of care: 3    Time Spent: 25 minutes    CPT Code: CPT Codes: 63527 - Psychotherapy (with patient) - 30 (16-37*) min    Diagnosis:   Patient Active Problem List   Diagnosis Code    Homeless single person Z59.00    Injury of right knee S89.91XA    Ulcer of esophagus with bleeding K22.11    Major depressive disorder, recurrent episode, moderate (H) F33.1    Polysubstance abuse (H) F19.10       Primary Problem This Admission: Active Hospital Problems    *Major depressive disorder, recurrent episode, moderate (H)      Polysubstance abuse (H)        [facility]   Licensed Mental Health Professional (LMHP), [facility]  [phone]

## 2025-01-15 NOTE — PHARMACY-ADMISSION MEDICATION HISTORY
Pharmacist Admission Medication History    Admission medication history is complete. The information provided in this note is only as accurate as the sources available at the time of the update.    Information Source(s): Patient and CareEverywhere/SureScripts via in-person    Pertinent Information: None    Changes made to PTA medication list:  Added: None  Deleted: Gabapentin, hydroxyzine, ibuprofen, lidocaine, melatonin, olanzapine, paroxetine, thiame  Changed: None    Allergies reviewed with patient and updates made in EHR: unable to assess    Medication History Completed By: Shakira Pope [facility] 1/14/2025 6:10 PM    PTA Med List   Medication Sig Last Dose/Taking    albuterol (PROAIR HFA/PROVENTIL HFA/VENTOLIN HFA) 108 (90 Base) MCG/ACT inhaler Inhale 2 puffs into the lungs every 4 hours as needed Unknown    EPINEPHrine (ANY BX GENERIC EQUIV) 0.3 MG/0.3ML injection 2-pack Inject 0.3 mg into the muscle as needed Unknown

## 2025-01-15 NOTE — PROGRESS NOTES
Patient reported that she is feeling itchy all over from getting stressed out. Offered to help her in the shower or give her some lotion. Patient refused. She would like some medication to help. Notified patient's nurse.

## 2025-01-15 NOTE — PROGRESS NOTES
Pt has not voided since 1500. Writer went in room to check with pt if she needs to use restroom at 2030. Pt declined.

## 2025-01-15 NOTE — CONSULTS
"      Initial Psychiatric Consult   Consult date: January 15, 2025         Reason for Consult, requesting source:    Pt is boarding with plan for IP MH admission    Requesting source: Juan Dennison    Labs and imaging reviewed. Provider contacted with recommendations.         HPI:   Psychiatry seeing patient today regarding patient waiting in the ED for IP mental health admission.       Jody Barrow presents to the ED via EMS. Patient is presenting to the ED for the following concerns: Suicidal ideation, Substance use, Worsening psychosocial stress. Factors that make the mental health crisis life threatening or complex are: Pt presents to ED via EMS for concerns of altered mental status, agitation, and depressive and anxious symptoms. Pt is homeless, and per triage note, police were called to a [place] after bystanders became concerned about pt's behavior. She reported to police and paramedics that she was having thoughts of hurting other people and has been for several weeks. Upon assessment, pt is oriented to person and day of the week, but not to place or date. Pt was sitting on the floor of her room in the corner crying and asked  \"what am I doing here?\".  Pt spoke rapidly and mumbled, making it difficult to gather history. Pt also reported that she did not know the answers to several of writer's questions, including if she had any current services. She also reported she did not know what happened at [place] prior to coming in. Pt reports that for several weeks she has felt increasingly anxious, hopeless, and confused. She reports \"everything is overwhelming and nothing is going right\", but was unable to give writer specific examples. She endorses trouble falling asleep, racing thoughts, and decreased appetite. She endorses passive SI, specifically stating \"I sometimes wish I wasn't here, but I wouldn't do anything about it\". Pt does have a prior suicide attempt in 2022 and endorses a history of " "SIB. Pt reports that she is homeless and took two shots of alcohol earlier today to try and stay warm. She drinks daily. She endorses \"occasional\" meth use, with last reported use being 2-3 weeks ago. Pt repeatedly stated \"please help me shut  my brain off, I can't get it to stop\" and then would start crying. Pt denies HI, but endorses that she wants to hurt other people so they can know how much she is hurting. She denies AH/VH.       Today, patient reports, \"My mind won't slow down. I can't slow down my thoughts.\" She denies AH/VH, but expresses that her mind is racing. Patient reports, \"It's quiet in here, so I feel safe\" referring to her room in the ED. She denies thoughts of harming herself or others.         Past Psychiatric History:   Pt has diagnosis history of generalized anxiety disorder, major depressive disorder ,alcohol abuse, methamphetamine. Pt has history of IP  admissions, most recent was in November 2022 after significant suicide attempt. [facility] until [facility]/23/[facility]. Pt does not currently have any services.         Substance Use and History:   UDS positive for amphetamines.         Past Medical History:   PAST MEDICAL HISTORY: No past medical history on file.    PAST SURGICAL HISTORY: No past surgical history on file.          Family History:   FAMILY HISTORY: No family history on file.        Social History:   Grew up in [place] and moved to [place] in 2022.          Physical ROS:   The 10 point Review of Systems is negative other than noted in the HPI or here.           Medications:     Current Facility-Administered Medications   Medication Dose Route Frequency Provider Last Rate Last Admin              Allergies:     Allergies   Allergen Reactions    Codeine Anaphylaxis    Sunflower Oil Swelling and Rash    Cat Dander Hives     All animal hair          Labs:   No results found for this or any previous visit (from the past 48 hours).       " Physical and Psychiatric Examination:     /73 (BP Location: Right arm, Patient Position: Left side, Cuff Size: Adult Regular)   Pulse 87   Temp 98.4  F (36.9  C) (Oral)   Resp 20   LMP  (LMP Unknown)   SpO2 97%   Weight is 0 lbs 0 oz  There is no height or weight on file to calculate BMI.    Physical Exam:  I have reviewed the physical exam as documented by by the medical team and agree with findings and assessment and have no additional findings to add at this time.         MSE:   Patient lying in the dark, huddled against the wall on hospital bed. Expresses racing thoughts,         DSM-5 Diagnosis:   Substance-Related & Addictive Disorders Stimulant Use Disorder:  In a controlled environment, Specify current severity:  Moderate  304.40 (F15.20) Moderate, Amphetamine type substance, r/o substance-induced psychosis    MDD, by history  GASTON, by history          Assessment:   Psychiatry seeing patient today regarding patient waiting in the ED for IP mental health admission. She has a historical diagnosis of MDD and GASTON. Patient admitted to the ED following disorganized behavior at a [place]. UDS positive for amphetamines, so it is possible that her presentation is the result of substance use. However, patient was quite guarded and was huddled next to the wall on her hospital bed and was quite guarded. It may be helpful for her to transfer to IP psychiatry for additional supports and stabilization.              Summary of Recommendations:   1) Given disorganized behavior, racing thoughts and anxiety, scheduled Zyprexa 10 mg BID.     2) Continue referral for IP psychiatry for additional stabilization and medication management.     3) If patient were to request to discharge before transferring to IP psychiatry, she would not be holdable, as she denies thoughts of harming herself or others at this time.           Page me or re-consult psychiatry as needed.       Roxane Moya, PMHNP, APRN  Consult/Liaison  Psychiatry  [facility]   Contact information available via [facility] Paging/Directory.  If I am not available, please call [facility] intake ([phone])

## 2025-01-15 NOTE — ED NOTES
Behavioral intake called and requesting labs, EKG, and COVID test to be completed. Per caller, labs and EKG can wait until when the patient wakes up.

## 2025-01-15 NOTE — ED NOTES
"Pt ambulated to bathroom, staff educated patient that a urine sample is needed. Pt refused to give urine sample stating \"I just want to be left alone, I'm not doing that\". Pt refusing for staff to complete labs, vital signs, COVID test, or EKG. Will approach patient and ask again at a different time.   "

## 2025-01-16 ENCOUNTER — TELEPHONE (OUTPATIENT)
Dept: BEHAVIORAL HEALTH | Facility: CLINIC | Age: 60
End: 2025-01-16
Payer: COMMERCIAL

## 2025-01-16 VITALS
RESPIRATION RATE: 17 BRPM | SYSTOLIC BLOOD PRESSURE: 129 MMHG | TEMPERATURE: 98.5 F | OXYGEN SATURATION: 98 % | HEART RATE: 83 BPM | DIASTOLIC BLOOD PRESSURE: 76 MMHG

## 2025-01-16 PROCEDURE — 250N000013 HC RX MED GY IP 250 OP 250 PS 637

## 2025-01-16 PROCEDURE — 250N000013 HC RX MED GY IP 250 OP 250 PS 637: Performed by: EMERGENCY MEDICINE

## 2025-01-16 PROCEDURE — 250N000013 HC RX MED GY IP 250 OP 250 PS 637: Performed by: REGISTERED NURSE

## 2025-01-16 RX ORDER — OLANZAPINE 5 MG/1
10 TABLET ORAL 2 TIMES DAILY
Status: DISCONTINUED | OUTPATIENT
Start: 2025-01-16 | End: 2025-01-17 | Stop reason: HOSPADM

## 2025-01-16 RX ORDER — IBUPROFEN 600 MG/1
600 TABLET, FILM COATED ORAL EVERY 6 HOURS PRN
Status: DISCONTINUED | OUTPATIENT
Start: 2025-01-16 | End: 2025-01-17 | Stop reason: HOSPADM

## 2025-01-16 RX ORDER — ACETAMINOPHEN 325 MG/1
975 TABLET ORAL EVERY 6 HOURS PRN
Status: DISCONTINUED | OUTPATIENT
Start: 2025-01-16 | End: 2025-01-17 | Stop reason: HOSPADM

## 2025-01-16 RX ADMIN — IBUPROFEN 600 MG: 600 TABLET, FILM COATED ORAL at 13:13

## 2025-01-16 RX ADMIN — OLANZAPINE 10 MG: 5 TABLET, FILM COATED ORAL at 20:25

## 2025-01-16 RX ADMIN — HYDROXYZINE HYDROCHLORIDE 25 MG: 25 TABLET ORAL at 13:15

## 2025-01-16 RX ADMIN — OLANZAPINE 10 MG: 10 TABLET, ORALLY DISINTEGRATING ORAL at 07:49

## 2025-01-16 ASSESSMENT — ACTIVITIES OF DAILY LIVING (ADL)
ADLS_ACUITY_SCORE: 54
ADLS_ACUITY_SCORE: 52
ADLS_ACUITY_SCORE: 52
ADLS_ACUITY_SCORE: 54
ADLS_ACUITY_SCORE: 52
ADLS_ACUITY_SCORE: 54

## 2025-01-16 NOTE — PROGRESS NOTES
"Triage & Transition Services, Extended Care     Therapy Progress Note    Patient: Leta goes by \"Leta,\" uses she/her pronouns  Date of Service: 2025  Site of Service: [facility] EMERGENCY DEPARTMENT                             [id number]  Patient was seen yes  Mode of Assessment: Virtual: AmWell    Presentation Summary: Pt is seen by extended care for therapeutic check-in and reassessment. Exchanged greeting, introduced self and role. Pt was observed to be able to participate in the assessment as evidenced by verbal consent. Writer engages Pt in a brief session. She is awake, lying in hospital bed throughout video visit. She exhibits paranoia and endorses feeling horrible, feeling overwhelmed, stressed, hopeless. She rates depression severity at 10/10 and suicide ideation severity at 10/10. She does say she does not plan to go through with suicide. She is difficult to understand at times as her voice seems muffled and the voice level fluctuates. On several occasions she indicated she did not want to talk due to people going in and out of her room. Pt indicates she is agreeable to continue with plan for inpatient treatment. She says \"I need someone to talk to.\" Writer encourages Pt to talk with writer and she becomes frustrated and states, \"I don't want to talk here because there are people who are coming in and out of this room. Pt indicates she moved from [facility] and mentions all of her family has . She feels disconnected, alone, depressed.  Reports passive suicidal ideation however states that she would never act, does continue to endorse homicidal ideation specific to wanting other people to feel what is like to hurt however does not have plan or intent. Pt indicates she no longer wants to talk and writer ends the session.    Therapeutic Intervention(s) Provided: Engaged in guided discovery, explored patient's perspectives and helped expand them through socratic " dialogue.    Current Symptoms: racing thoughts, excessive worry, anxious avoidance, low self esteem, impaired decision making, irritable, hoplessness, helplessness, crying or feels like crying, negativistic, excessive guilt racing thoughts, anxious, excessive worry        Mental Status Exam   Affect: Constricted  Appearance: Disheveled  Attention Span/Concentration: Attentive  Eye Contact: Variable    Fund of Knowledge: Appropriate   Language /Speech Content: Fluent  Language /Speech Volume: Soft  Language /Speech Rate/Productions: Normal  Recent Memory: Variable  Remote Memory: Variable  Mood: Anxious, Depressed, Irritable, Sad  Orientation to Person: Yes   Orientation to Place: Yes  Orientation to Time of Day: Yes  Orientation to Date: Yes     Situation (Do they understand why they are here?): Yes  Psychomotor Behavior: Normal  Thought Content: Suicidal  Thought Form: Intact    Treatment Objective(s) Addressed: rapport building, processing feelings, assessing safety, building self-esteem, identifying additional supports    Patient Response to Interventions: acceptance expressed, verbalizes understanding    Progress Towards Goals: Patient Reports Symptoms Are: ongoing    Patient Progress Toward Goals: is not making progress    Comment: Patient wants to get help, wants someone to talk to. Patient could benefit from acute stabilzation.    Next Step to Work Toward Discharge: symptom stabilization    Symptom Stabilization Comment: Inpatient hospitalization. She is on a worklist for placement.    Case Management:      Plan: inpatient mental health    yes provider, RN Recommend continue with plan for Count includes the [facility] treatment services.  yes    Clinical Substantiation: Recommendation remains for inpatient hospitalization for safety, stabilization, and further assessment. Patient initially presented to the ED via EMS for concerns of altered mental status, agitation, depression, and anxiety. Patient does not feel safe, has passive  suicidal ideation, continues to experience high levels of anxiety and fear. Due to the acute risk of harm to self inpatient hospitalization is needed for safety and stabilization. Patient was seen by JOLIE Moya who also supports inpatient placement.    Legal Status: Legal Status: Voluntary/Patient has signed consent for treatment    Session Status: Time session started: 1501  Time session ended: 1508  Session Duration (minutes): 7 minutes  Session Number: 1  Anticipated number of sessions or this episode of care: 3    Time Spent: 7 minutes    CPT Code: CPT Codes: 51919 - Psychotherapy (with patient) - 30 (16-37*) min    Diagnosis:   Patient Active Problem List   Diagnosis Code    Homeless single person Z59.00    Injury of right knee S89.91XA    Ulcer of esophagus with bleeding K22.11    Major depressive disorder, recurrent episode, moderate (H) F33.1    Polysubstance abuse (H) F19.10       Primary Problem This Admission: Active Hospital Problems    *Major depressive disorder, recurrent episode, moderate (H)      Polysubstance abuse (H)    F33.1      Frank Del Real, [facility]   Licensed Mental Health Professional (LMHP), [facility]  [phone]

## 2025-01-16 NOTE — ED NOTES
Patient feeling slightly anxious and having knee pain. Given PRNs. When asked if there was anything else patient needs, she declined

## 2025-01-16 NOTE — TELEPHONE ENCOUNTER
R: [place]  Access Inpatient Bed Call Log  1/16/25 @ 12:30am   Intake has called facilities that have not updated their bed status within the last 12 hours.     *METRO:  [place] -- [facility]: @ capacity.  [facility]: POSTING 8 BEDS. No reviews overnight.  [place] -- [facility]: @ cap per website. Low acuity  [place] -- [facility]: @ cap per website. Low acuity only.  [place] -- [facility]: @ cap per website.  [facility]: @ cap per website.  [facility]/ beds: POSTING 1 BED. Ages 18-33, Voluntary only, NO aggression/physical/sexual assault, violence hx or drug abuse, or psychosis. Negative Covid (Not age appropriate)  [place] -- [facility]: @ cap per website.  [place] -- [facility]: @ cap per website.  [place] -- [facility]: @ cap per website. Do not review overnight.  [facility]- Declined due to pt acuity      Pt remains on waitlist pending appropriate placement availability.

## 2025-01-16 NOTE — ED NOTES
Pt ambulating in room, said she was getting hot did offer to change into scrubs pt did refuse. Pt then grabbed the pillow and set it on the floor and laid down. I told pt I would change her linen and she can lay back in bed when she felt better. Pt is calm and cooperative.

## 2025-01-16 NOTE — TELEPHONE ENCOUNTER
R: [place]  Access Inpatient Bed Call Log 1/16/2025 @ 7:05 am:     Intake has called facilities that have not updated the bed status within the last 12 hours.           (Adults);                  [facility] is posting 0 beds.               [facility] is posting 8 beds. [phone]: per call at 7:06 am to [facility], they are at cap.    [facility] is posting 0 beds. Negative covid required.     [facility] is posting 0 beds. Neg covid. No high school/[facility]-psych. [phone]. Per call at 7:08 am to [facility], they may possibly be having a few d/c's later from step-down/low acuity.    [facility] is posting 0 beds. [phone]     [facility] is posting 0 bed. [phone]      [facility] is posting 1 bed. (Ages 18-35) Negative covid. [phone]; per call at 7:10 am to [place], they are at cap.     [facility] is posting 0 beds.      [facility] is posting 0 beds [phone]       Pt remains on the work list pending appropriate bed availability.

## 2025-01-16 NOTE — ED PROVIDER NOTES
[facility] EMERGENCY DEPARTMENT   ED Mental Health Observation - Daily Note for 1/16/2025    Jody Barrow is a 59 year old female currently boarding in the ED while awaiting placement for Mental health crisis.  Please see the initial H&P for this patient's presentation, workup, and disposition plan.     Hold Status:  Patient is Voluntary, but holdable    Plan:  In brief, the patient's presentation is notable for Suicidal ideation or homicidal ideation.  Patient also has a history of polysubstance abuse.  She is currently waiting for mental health placement..   Patient is awaiting Mental health placement    Interim History:  There were no significant events since last note.    Physical Exam:  /78 (BP Location: Right arm)   Pulse 94   Temp 98.2  F (36.8  C) (Oral)   Resp 18   LMP  (LMP Unknown)   SpO2 97%   No respiratory distress, on room air   Well perfused  Behavior appropriate    Medications provided prior to my care:  Medications   OLANZapine zydis (zyPREXA) ODT tab 5 mg (has no administration in time range)   hydrOXYzine HCl (ATARAX) tablet 25 mg (25 mg Oral $Given 1/15/25 1110)   melatonin tablet 3 mg (has no administration in time range)   hydrOXYzine HCl (ATARAX) tablet 25 mg (has no administration in time range)   OLANZapine zydis (zyPREXA) ODT tab 10 mg (10 mg Oral $Given 1/15/25 1949)   haloperidol lactate (HALDOL) injection 5 mg (5 mg Intramuscular Not Given 1/14/25 1642)   LORazepam (ATIVAN) injection 2 mg (2 mg Intramuscular Not Given 1/14/25 1643)       Laboratory (reviewed and interpreted):  Labs Ordered and Resulted from Time of ED Arrival to Time of ED Departure   COMPREHENSIVE METABOLIC PANEL - Abnormal       Result Value    Sodium 143      Potassium 4.8      Carbon Dioxide (CO2) 32 (*)     Anion Gap 9      Urea Nitrogen 18.1      Creatinine 0.72      GFR Estimate >90      Calcium 10.0      Chloride 102      Glucose 111 (*)     Alkaline Phosphatase 83       AST 49 (*)     ALT 61 (*)     Protein Total 7.2      Albumin 4.3      Bilirubin Total 0.7     CBC WITH PLATELETS AND DIFFERENTIAL - Abnormal    WBC Count 4.6      RBC Count 4.86      Hemoglobin 16.0 (*)     Hematocrit 46.7      MCV 96      MCH 32.9      MCHC 34.3      RDW 11.9      Platelet Count 338      % Neutrophils 42      % Lymphocytes 42      % Monocytes 12      % Eosinophils 2      % Basophils 2      % Immature Granulocytes 0      NRBCs per 100 WBC 0      Absolute Neutrophils 1.9      Absolute Lymphocytes 1.9      Absolute Monocytes 0.5      Absolute Eosinophils 0.1      Absolute Basophils 0.1      Absolute Immature Granulocytes 0.0      Absolute NRBCs 0.0     URINE DRUG SCREEN PANEL - Abnormal    Amphetamines Urine Screen Positive (*)     Barbituates Urine Screen Negative      Benzodiazepine Urine Screen Negative      Cannabinoids Urine Screen Negative      Cocaine Urine Screen Negative      Fentanyl Qual Urine Screen Negative      Opiates Urine Screen Negative      PCP Urine Screen Negative     SALICYLATE LEVEL - Normal    Salicylate <0.3     ETHYL ALCOHOL LEVEL - Normal    Alcohol ethyl <0.01     INFLUENZA A/B, RSV AND SARS-COV2 PCR - Normal    Influenza A PCR Negative      Influenza B PCR Negative      RSV PCR Negative      SARS CoV2 PCR Negative         ED Course:  0705 I met with the patient and discussed plan with care team.     Impression/Plan:  1. Suicidal ideation        MD Denny Moreira Jacob L, MD  01/16/25 0705

## 2025-01-16 NOTE — ED NOTES
IP MH Referral Acuity Rating Score (RARS)    LMHP complete at referral to IP MH, with DEC; and, daily while awaiting IP MH placement. Call score to PPS.  CRITERIA SCORING   New 74 HH and Involuntary for IP MH (not adolescent) 0/1   Boarding over 24 hours 1/1   Vulnerable adult at least 55+ with multiple co morbidities; or, Patient age 11 or under 0/1   Suicide ideation without relief of precipitating factors 1/1   Current plan for suicide 0/1   Current plan for homicide 0/1   Imminent risk or actual attempt to seriously harm another without relief of factors precipitating the attempt 0/1   Severe dysfunction in daily living (ex: complete neglect for self care, extreme disruption in vegetative function, extreme deterioration in social interactions) 1/1   Recent (last 2 weeks) or current physical aggression in the ED 0/1   Restraints or seclusion episode in ED 0/1   Verbal aggression, agitation, yelling, etc., while in the ED 0/1   Active psychosis with psychomotor agitation or catatonia 0/1   Need for constant or near constant redirection (from leaving, from others, etc).  1/1   Intrusive or disruptive behaviors 0/1   TOTAL Acuity Total Score: 4

## 2025-01-16 NOTE — ED NOTES
Patient given crackers, pudding, ice cream, an orange juice per request, denies any other needs at this time.

## 2025-01-17 ENCOUNTER — HOSPITAL ENCOUNTER (INPATIENT)
Facility: CLINIC | Age: 60
LOS: 7 days | Discharge: PSYCHIATRIC OR SUBSTANCE USE FACILITY | End: 2025-01-24
Attending: PSYCHIATRY & NEUROLOGY | Admitting: PSYCHIATRY & NEUROLOGY
Payer: COMMERCIAL

## 2025-01-17 DIAGNOSIS — T78.40XA ALLERGIC REACTION, INITIAL ENCOUNTER: ICD-10-CM

## 2025-01-17 DIAGNOSIS — F41.9 ANXIETY: ICD-10-CM

## 2025-01-17 DIAGNOSIS — F31.9 BIPOLAR 1 DISORDER (H): ICD-10-CM

## 2025-01-17 DIAGNOSIS — F33.1 MAJOR DEPRESSIVE DISORDER, RECURRENT EPISODE, MODERATE (H): Primary | ICD-10-CM

## 2025-01-17 PROCEDURE — 99223 1ST HOSP IP/OBS HIGH 75: CPT | Mod: AI | Performed by: PSYCHIATRY & NEUROLOGY

## 2025-01-17 PROCEDURE — 250N000013 HC RX MED GY IP 250 OP 250 PS 637

## 2025-01-17 PROCEDURE — 124N000002 HC R&B MH UMMC

## 2025-01-17 PROCEDURE — 250N000013 HC RX MED GY IP 250 OP 250 PS 637: Performed by: PSYCHIATRY & NEUROLOGY

## 2025-01-17 RX ORDER — HYDROXYZINE HYDROCHLORIDE 25 MG/1
25 TABLET, FILM COATED ORAL EVERY 4 HOURS PRN
Status: DISCONTINUED | OUTPATIENT
Start: 2025-01-17 | End: 2025-01-21

## 2025-01-17 RX ORDER — ACETAMINOPHEN 325 MG/1
650 TABLET ORAL EVERY 4 HOURS PRN
Status: DISCONTINUED | OUTPATIENT
Start: 2025-01-17 | End: 2025-01-24 | Stop reason: HOSPADM

## 2025-01-17 RX ORDER — TRAZODONE HYDROCHLORIDE 50 MG/1
50 TABLET, FILM COATED ORAL
Status: DISCONTINUED | OUTPATIENT
Start: 2025-01-17 | End: 2025-01-24 | Stop reason: HOSPADM

## 2025-01-17 RX ORDER — NAPROXEN 250 MG/1
250 TABLET ORAL 2 TIMES DAILY PRN
Status: DISCONTINUED | OUTPATIENT
Start: 2025-01-17 | End: 2025-01-24 | Stop reason: HOSPADM

## 2025-01-17 RX ORDER — OLANZAPINE 10 MG/1
10 TABLET ORAL 3 TIMES DAILY PRN
Status: DISCONTINUED | OUTPATIENT
Start: 2025-01-17 | End: 2025-01-24 | Stop reason: HOSPADM

## 2025-01-17 RX ORDER — OLANZAPINE 7.5 MG/1
7.5 TABLET, FILM COATED ORAL AT BEDTIME
Status: DISCONTINUED | OUTPATIENT
Start: 2025-01-17 | End: 2025-01-17

## 2025-01-17 RX ORDER — MAGNESIUM HYDROXIDE/ALUMINUM HYDROXICE/SIMETHICONE 120; 1200; 1200 MG/30ML; MG/30ML; MG/30ML
30 SUSPENSION ORAL EVERY 4 HOURS PRN
Status: DISCONTINUED | OUTPATIENT
Start: 2025-01-17 | End: 2025-01-24 | Stop reason: HOSPADM

## 2025-01-17 RX ORDER — OLANZAPINE 10 MG/2ML
10 INJECTION, POWDER, FOR SOLUTION INTRAMUSCULAR 3 TIMES DAILY PRN
Status: DISCONTINUED | OUTPATIENT
Start: 2025-01-17 | End: 2025-01-24 | Stop reason: HOSPADM

## 2025-01-17 RX ADMIN — OLANZAPINE 7.5 MG: 7.5 TABLET, FILM COATED ORAL at 21:10

## 2025-01-17 RX ADMIN — OLANZAPINE 10 MG: 5 TABLET, FILM COATED ORAL at 07:41

## 2025-01-17 ASSESSMENT — ACTIVITIES OF DAILY LIVING (ADL)
ADLS_ACUITY_SCORE: 54
ADLS_ACUITY_SCORE: 54
ADLS_ACUITY_SCORE: 64
ADLS_ACUITY_SCORE: 64
ADLS_ACUITY_SCORE: 54
ADLS_ACUITY_SCORE: 54
ADLS_ACUITY_SCORE: 64
ADLS_ACUITY_SCORE: 64
ADLS_ACUITY_SCORE: 54
ADLS_ACUITY_SCORE: 64
ADLS_ACUITY_SCORE: 84
DRESS: INDEPENDENT;PROMPTS
HYGIENE/GROOMING: INDEPENDENT;PROMPTS
ADLS_ACUITY_SCORE: 84
ADLS_ACUITY_SCORE: 54
ADLS_ACUITY_SCORE: 64
ADLS_ACUITY_SCORE: 54
ADLS_ACUITY_SCORE: 84
ADLS_ACUITY_SCORE: 64
ORAL_HYGIENE: PROMPTS;INDEPENDENT
ADLS_ACUITY_SCORE: 54
ADLS_ACUITY_SCORE: 54

## 2025-01-17 NOTE — PHARMACY-ADMISSION MEDICATION HISTORY
Please see Admission Medication History completed on 1/14 by Shakira Pope wilfrid under previous encounter at [facility] Emergency Department for information regarding prior to admission medications.     Sachi Corbett, ChristianoD/MSLD

## 2025-01-17 NOTE — H&P
"  ----------------------------------------------------------------------------------------------------------  [facility]   Psychiatry History and Physical    Name: Jody Barrow   MRN#: [id number]  Age: 59 year old YOB: 1965    Date of Admission: 1/17/2025  Attending Physician: Coy Vasquez M.D., Ph.D.         Contacts:     Primary Outpatient Psychiatrist: unknown   Primary Physician: No Ref-Primary, Physician  Therapist: Unknown  [facility] : Marissa Pinon  Probation/: N/A  Family Members: None     Chief Concern:     \"Not having a good time\"     History of Present Illness:     Jody Barrow is a 59 year old female with previous psychiatric diagnoses of MDD, GASTON admitted from the ER on 01/17/2025 due to concern for SI, HI, and aggression in the context of substance use.    [facility]/DEC Assessment:   Pt presents to ED via EMS for concerns of altered mental status, agitation, and depressive and anxious symptoms. Pt is homeless, and per triage note, police were called to a [place] after bystanders became concerned about pt's behavior. She reported to police and paramedics that she was having thoughts of hurting other people and has been for several weeks. Upon assessment, pt is oriented to person and day of the week, but not to place or date. Pt was sitting on the floor of her room in the corner crying and asked  \"what am I doing here?\".  Pt spoke rapidly and mumbled, making it difficult to gather history. Pt also reported that she did not know the answers to several of writer's questions, including if she had any current services. She also reported she did not know what happened at [place] prior to coming in.      It is the recommendation of this clinician that pt admit to [facility] for safety and stabilization. Pt presents to ED via EMS for concerns of altered mental status, agitation, and depressive and anxious symptoms. Pt " "endorses following risk factors that make inpatient level of care appropriate: not oriented to place, date, situation and cannot remember why she was brought to ED, lack of support system, no outpatient services in place, generalized thoughts of harm to others,  and was unable to safety plan. Pt is in agreement with plan.     ED/Hospital Course:  Jody Barrow was medically cleared for admission to inpatient psychiatric unit. In the ED, Behavorial emergency response code called on 1/15. The patient's laboratory tests were reassuring. The patient's urine drug screen was positive for amphetamines. She received hydroxyzine and Zyprexa likely for agitation.     Patient interview:  Jody Barrow was interviewed in her room. She initially asked the medical students how old they were. She was reserved, and she feels uncomfortable with all the students in the room. Two students left. She confided in the remaining team members that she is not having a good time ajusting to [place]. She feels that people here are rude and she struggles to drive on ice and is frustrated by this as it is hard to go anywhere.     Regarding her mental health, she feels it is the same as her last admission and she cannot get her brain to change and she does not like it here and she cannot find a job and feels disoriented here. She does not have many connections here. She moved here for the man she was dating. Her sleep has been \"crappy, always crappy.\"    From her last admission she was discharged with medications that she has not been taking due to the heavy feeling they gave her. Does not take any medications at all. She would like zyprexa to sleep tonight.     She has had no visual or auditoy hallucinations but endorses \"stress screaming out of (her) ears.\" This stress is caused by everything including difficultly adjusting to this environment.     She has been staying in her car. She says she has it set up for cold with propane heater " "and fan. She does not have any delusions of persecution but talks about drones are everywhere but it does not feel like they are targeting her. She endorses lots of depression, and a couple attempts at sucide, fear of hell has prevented her from following through. She is hoping to \"get a little more sanity out of her stay here.\" She does not know of a family history of mental illness. But her brother almost  by sucide, his wife was killed while he was [place] in the .     She understands her past diagnoses to be depression and PTSD and thinks these are accurate, \"to a point.\" Her substance use includes alcohol. She has been buying a pint multiple times a week to everyday. She knows of no history of alchohol withdrawal. Her last drink was 2-3 days ago. She has no withdrawal symptoms as of now. She also got high a couple times.     She has no family she is still connected to. She is not with the boyfriend she moved here with and not sure what their relationship status is. Feels safe with him but there has been difficulty between them for the last couple months caused by stress.    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 Her role is clean up.    She reports her R knee is \"fucked up\" but no heart or lung issues as she understands.     She wishes to contact her  from her last admission, Marissa Pinon. She states that she has dissapeared for a while.     Psychiatric Review of Systems:     Depressive:   Reports suicidal ideation, depressed mood, and low energy    Denies hypersomnia   Dysregulation:    Reports suicidal ideation and violent ideation with plan; with intent [details in Interim History]    Denies none   Psychosis:    Reports none   Denies delusions, auditory hallucinations, and visual hallucinations  [place]:    Reports none   Denies increased energy and increased activity  Anxiety:    Reports worries and social fears   Denies none  PTSD: "    Reports trauma   Denies none  ADHD:    Reports none   Denies none  Disordered Eating:   Reports none, none  Denies none, none  Cluster B:   Reports difficulty with stable relationships  Denies none     Medical Review of Systems:     The Review of Systems is negative other than what is noted in the HPI.     Psychiatric History:     Prior diagnoses: Previous psychiatric diagnoses include generalized anxiety disorder, major depressive disorder ,alcohol abuse, methamphetamine .     Hospitalizations: Multiple at [facility] for MDD . Most recent hospitalization was at [facility] in 11/2022 for SI.     Court Commitments: None per Chart Review.     Suicide attempts: Yes     Self-injurious behavior: Yes    Violence towards others: Yes    ECT/TMS: None per Patient Reported.    Past medications:   Per Chart Review: extensive, last discharged with escitalopram      Substance Use History:     Alcohol: Endorses 1 pint nearly everyday      Nicotine: Denies     Illicit Substances: Endorses  current or past addiction to methamphetamine    Chemical Dependency Treatment: Endorses history of chemical dependency treatment      Social History:     Upbringing: Grew up in [place].     Family/Relationships:  Boyfriend currently uncertain. No other family connections    Living Situation:  car    Education: Highest level of education obtained is:  unknown    Occupation: Works with a tree trimming business     Legal: unknown     Guns: Unknown    Abuse/Trauma: Endorses history of trauma      Service: None    Spirituality: Unknown     Hobbies/Interests: Unknown      Past Medical/Surgical History:     I have reviewed this patient's past medical history  Reports knee pain Denies history of:  Heart or lung issues  No past medical history on file.    I have reviewed this patient's past surgical history  No past surgical history on file.     Family History:     Psychiatric Family Hx: Depression: brother  No family history on file.     Allergies:       Allergies   Allergen Reactions    Codeine Anaphylaxis    Sunflower Oil Swelling and Rash    Cat Dander Hives     All animal hair        Medications:     Medications Prior to Admission   Medication Sig Dispense Refill Last Dose/Taking    albuterol (PROAIR HFA/PROVENTIL HFA/VENTOLIN HFA) 108 (90 Base) MCG/ACT inhaler Inhale 2 puffs into the lungs every 4 hours as needed       EPINEPHrine (ANY BX GENERIC EQUIV) 0.3 MG/0.3ML injection 2-pack Inject 0.3 mg into the muscle as needed          See current inpatient medications below.     Vitals and Physical Exam:     BP (!) 166/90 (BP Location: Left arm)   Pulse 98   Temp 98.2  F (36.8  C) (Oral)   Resp 16   Wt 60.8 kg (134 lb)   LMP  (LMP Unknown)     See ED assessment note by ED physician on 1/14/25.     Labs and Imaging:     Recent Results (from the past 72 hours)   Comprehensive metabolic panel    Collection Time: 01/15/25 10:07 AM   Result Value Ref Range    Sodium 143 135 - 145 mmol/L    Potassium 4.8 3.4 - 5.3 mmol/L    Carbon Dioxide (CO2) 32 (H) 22 - 29 mmol/L    Anion Gap 9 7 - 15 mmol/L    Urea Nitrogen 18.1 6.0 - 20.0 mg/dL    Creatinine 0.72 0.51 - 0.95 mg/dL    GFR Estimate >90 >60 mL/min/1.73m2    Calcium 10.0 8.8 - 10.4 mg/dL    Chloride 102 98 - 107 mmol/L    Glucose 111 (H) 70 - 99 mg/dL    Alkaline Phosphatase 83 40 - 150 U/L    AST 49 (H) 0 - 45 U/L    ALT 61 (H) 0 - 50 U/L    Protein Total 7.2 6.4 - 8.3 g/dL    Albumin 4.3 3.5 - 5.2 g/dL    Bilirubin Total 0.7 <=1.2 mg/dL   Influenza A/B, RSV and SARS-CoV2 PCR (COVID-19) Nasopharyngeal    Collection Time: 01/15/25 10:07 AM    Specimen: Nasopharyngeal; Swab   Result Value Ref Range    Influenza A PCR Negative Negative    Influenza B PCR Negative Negative    RSV PCR Negative Negative    SARS CoV2 PCR Negative Negative   CBC with platelets and differential    Collection Time: 01/15/25 10:07 AM   Result Value Ref Range    WBC Count 4.6 4.0 - 11.0 10e3/uL    RBC Count 4.86 3.80 - 5.20 10e6/uL     Hemoglobin 16.0 (H) 11.7 - 15.7 g/dL    Hematocrit 46.7 35.0 - 47.0 %    MCV 96 78 - 100 fL    MCH 32.9 26.5 - 33.0 pg    MCHC 34.3 31.5 - 36.5 g/dL    RDW 11.9 10.0 - 15.0 %    Platelet Count 338 150 - 450 10e3/uL    % Neutrophils 42 %    % Lymphocytes 42 %    % Monocytes 12 %    % Eosinophils 2 %    % Basophils 2 %    % Immature Granulocytes 0 %    NRBCs per 100 WBC 0 <1 /100    Absolute Neutrophils 1.9 1.6 - 8.3 10e3/uL    Absolute Lymphocytes 1.9 0.8 - 5.3 10e3/uL    Absolute Monocytes 0.5 0.0 - 1.3 10e3/uL    Absolute Eosinophils 0.1 0.0 - 0.7 10e3/uL    Absolute Basophils 0.1 0.0 - 0.2 10e3/uL    Absolute Immature Granulocytes 0.0 <=0.4 10e3/uL    Absolute NRBCs 0.0 10e3/uL   Salicylate level    Collection Time: 01/15/25 10:08 AM   Result Value Ref Range    Salicylate <0.3   mg/dL   Alcohol level blood    Collection Time: 01/15/25 10:08 AM   Result Value Ref Range    Alcohol ethyl <0.01 <=0.01 g/dL   Extra Blue Top Tube    Collection Time: 01/15/25 10:09 AM   Result Value Ref Range    Hold Specimen [facility]    Urine Drug Screen Panel    Collection Time: 01/15/25  2:30 PM   Result Value Ref Range    Amphetamines Urine Screen Positive (A) Screen Negative    Barbituates Urine Screen Negative Screen Negative    Benzodiazepine Urine Screen Negative Screen Negative    Cannabinoids Urine Screen Negative Screen Negative    Cocaine Urine Screen Negative Screen Negative    Fentanyl Qual Urine Screen Negative Screen Negative    Opiates Urine Screen Negative Screen Negative    PCP Urine Screen Negative Screen Negative        Mental Status Examination:     Oriented to:  Grossly Oriented  General:  Awake and Alert  Appearance:  appears stated age, Grooming is adequate, and Dressed in own clothing  Behavior/Attitude:  Calm, Cooperative, Engaged, and Guarded  Eye Contact: Downcast  Psychomotor: Normal no catatonia present  Speech:  appropriate volume/tone  Language: Fluent in English with appropriate syntax and vocabulary.  Mood:   "\"Not having a good time\"  Affect:  congruent with mood, flat, and sad  Thought Process:  linear and coherent  Thought Content:   No VH and No AH; No apparent delusions  Associations:  intact  Insight:  fair due to knowledge of her past diagnosis  Judgment:  limited due to not taking medications, using propane inside car  Impulse control: limited  Attention Span:  grossly intact  Concentration:  grossly intact  Recent and Remote Memory:  not formally assessed  Fund of Knowledge: average  Muscle Strength and Tone: normal  Gait and Station:  Not evaluated, patient was in bed     Psychiatric Assessment:     Jody Barrow is a 59 year old female previously diagnosed with MDD, GASTON alcohol and methamphetamine use who presented voluntarily with HI and MI in the context of housing instability and alcohol and methamphetamine use. Most recent psychiatric hospitalization was 11/2022. Significant symptoms on admission include depressed mood and guardedness. The MSE on admission was pertinent for flat sad mood. Biological contributions to mental health presentation include MDD, GASTON, and alcohol and methamphetamine use. Psychological contributions to mental health presentation include history of trauma, and distrust of others. Social factors contributing to mental health presentation include lack of close connections, turbulence in relationship with boyfriend and housing instability, lack of stable income. Protective factors voluntarily involving herself in inpatient treatment.     In summary, the patient's reported symptoms of depressed mood and social isolation in the context of her recent HI, SI, and substance use are consistent MDD and/vs substance induced psychosis. She will likely benefit from medication management and treatment and support resource coordination and facilitation this admission.    Given that she currently has SI and HI, patient warrants inpatient psychiatric hospitalization to maintain her safety.      " Psychiatric Plan by Diagnosis      # MDD  # Alcohol use disorder  # Methamphetamine use  # Substance induced psychosis  1. Medications:  - Zyprexa 7.5 mg BID     2. Pertinent Labs/Monitoring:   - None     3. Additional Plans:  - Patient will be treated in therapeutic milieu with appropriate individual and group therapies as described     Psychiatric Hospital Course:      Jody Barrow was admitted to [facility] as a voluntary patient.   Medications:  New medications started at the time of admission include:   Scheduled  Zyprexa 7.5 mg BID  PRN  Hydroxyzine 25 mg  Trazodone 50 mg   Zyprexa 10 mg  Naproxen 250 mg  Tylenol 650 mg  Maalox    The risks, benefits, alternatives, and side effects were discussed and understood by the patient and other caregivers.     Medical Assessment and Plan     Medical diagnoses to be addressed this admission:    # Knee pain  - Naproxen 250 mg PRN    # Asthma   - No meds    Medical course: Patient was physically examined by the ED prior to being transferred to the unit and was found to be medically stable and appropriate for admission.     Consults:  none     Checklist     Legal Status: Volentary. None      Safety Assessment:   Behavioral Orders   Procedures    Code 1 - Restrict to Unit    Routine Programming     As clinically indicated    Status 15     Every 15 minutes.    Status Individual Observation     Patient SIO status reviewed with team/RN.  Please also refer to RN/team documentation for add'l detail.    -SIO staff to monitor following which have contributed to patient being on SIO:  Refusing to cooperate with safety search  -Possible interventions SIO staff could use to support patient's treatment progress:  Prevent SIB  -When following observed, team will review discontinuation of SIO:  When she submits to search and maintains safe behavior     Order Specific Question:   CONTINUOUS 24 hours / day     Answer:   Other     Order Specific Question:   Specify distance      Answer:   10feet     Order Specific Question:   Indications for SIO     Answer:   Self-injury risk       Risk Assessment:  Risk for harm is elevated.  Risk factors: SI, HI, substance use, trauma, family history, and past behaviors  Protective factors: engaged in treatment     SIO: No    Dispo: TBD. Disposition pending clinical stabilization, medication optimization and development of an appropriate discharge plan.     Attestations:   Jody Barrow was seen and plans were discussed with attending psychiatrist Dr. Coy Vasquez.     Ariel Chang, MS3  [facility] Medical Student      I was present with the medical student who participated in the service and in the documentation of the note. I have verified the history and personally performed the exam and medical decision making. I agree with the assessment and plan of care as documented in the note. I, have personally performed an examination of this patient and I have reviewed the students documentation.  I have edited the note to reflect all relevant changes.  I have discussed this patient with the house staff on 1/17/2025.  I agree with resident findings and plan in today's note and yesterdays resident H&P.  I have reviewed all vitals and laboratory findings.      I certifiy that the inpatient services were ordered in accordance with the Medicare regulations governing the order. This includes certification that hospital inpatient services are reasonable and necessary and in the case of services not specified as inpatient-only under 42 .22(n), that they are appropriately provided as inpatient services in accordance with the 2-midnight benchmark under 42 .3(e).     The reason for inpatient status is Major Depression and Danger to others due to mental illness.    Coy Vasquez M.D.,Ph.D.

## 2025-01-17 NOTE — ED NOTES
ED Behavioral Health Patient Transition of Care Note      Brief behavioral history:  code KONG upon admit to ED / voluntary at this time / suicidal and homicidal ideation    Any outstanding medical concerns:  n/a    Has SW seen the patient:  yes    Is the patient on a hold:   voluntary, but holdable     Current plan for disposition:   no inpatient bed available at this time     Safety concerns:  patient has been cooperative overnight / 1:1 sitter in place    Dietary restrictions:  None, pt can eat and drink ad sunitha    Significant events during shift:  unremarkable over night

## 2025-01-17 NOTE — ED NOTES
Brought pt to bathroom remained in bathroom no issues.  When returning to room she asked me if I could move the bed to a different lociation in room.  I felt this was fine and I did move her bed to face a different direction

## 2025-01-17 NOTE — ED PROVIDER NOTES
[facility] EMERGENCY DEPARTMENT   ED Mental Health Observation - Discharge Note (Brief)    Jody Barrow is boarding in the ED after undergoing evaluation for Mental health crisis  Please see the daily progress note for this patient's presentation, physical examination, and work up.    Upon reevaluation and discussion with DEC , we believe patient has shown insufficient improvement and thus will be Transferred.    EMERGENCY DEPARTMENT OBSERVATION status ended at 12:47 PM 1/17/2025    Discharge Management Time: < 30 minutes    1. Suicidal ideation        MD Michelle Maldonado Scott E., MD  01/17/25 1243

## 2025-01-17 NOTE — ED NOTES
Call received from [facility] include 234 beds at the [facility] with mental health intake.  No bed available at this time with patient's current needs.

## 2025-01-17 NOTE — TELEPHONE ENCOUNTER
R: [place]  Access Inpatient Bed Call Log 1/16/2025 @ 10:00 PM:    Intake has called facilities that have not updated the bed status within the last 12 hours.          (Adults);               [facility] is posting 0 beds.              [facility] is posting 8 beds. [phone]: per call at 10:20 PM at capacity, try back tomorrow  [facility] is posting 0 beds. Negative covid required.    [facility] is posting 0 beds. Neg covid. No high school/[facility]-psych. [phone]. Per call at 7:08 am to [facility], they may possibly be having a few d/c's later from step-down/low acuity.   [facility] is posting 0 beds. [phone]    [facility] is posting 0 bed. [phone]     [facility] is posting 1 bed. (Ages 18-35) Negative covid. [phone];   [facility] is posting 0 beds.     [facility] is posting 0 beds [phone]           Pt remains on the work list pending appropriate bed availability.

## 2025-01-17 NOTE — ED NOTES
Confirmed with security that patient had been wanded and searched upon arrival and after a Code Nir had been called. She was allowed to keep belongings in bag and locked up in sink closet and in her own clothes as she refused scrubs.  Sink closet door secured

## 2025-01-17 NOTE — PROGRESS NOTES
Pt admitted to station [facility] via [facility].  Pt allowed vitals to be taken but declined search.  Pt on SIO d/t declining search.  Pt stated she changed clothes at last hospital and does not want to change them again.  Explained the search hospital policy.  But  stated she just wanted to lay down.  Pt denied SI.  Pt declined to answer any other questions  RN Admit needs to be completed.  Per report from [facility]'s pt had SI/HI thoughts.  Per report the thoughts were vague.  Per report  pt maybe homeless.  Pt currently laying in bed and has SIO,

## 2025-01-17 NOTE — ED PROVIDER NOTES
[facility] EMERGENCY DEPARTMENT   ED Mental Health Observation - Daily Note for 1/17/2025    Jody Barrow is a 59 year old female currently boarding in the ED while awaiting placement for Mental health crisis.  Please see the initial H&P for this patient's presentation, workup, and disposition plan.     Hold Status:  Patient is Voluntary, but holdable    Plan:  In brief, the patient's presentation is notable for presenting to emergency department and mental health crisis with both homicidal and suicidal ideation.  Currently voluntary.  Assessed by mental health with recommendations for inpatient admission.  Considered holdable..   Patient is awaiting Mental health placement    Interim History:  Patient excepted in [place] and anticipate transfer    Physical Exam:  /76   Pulse 83   Temp 98.5  F (36.9  C) (Oral)   Resp 17   LMP  (LMP Unknown)   SpO2 98%   No respiratory distress, on room air   Well perfused  Behavior appropriate    Medications provided prior to my care:  Medications   OLANZapine zydis (zyPREXA) ODT tab 5 mg (has no administration in time range)   hydrOXYzine HCl (ATARAX) tablet 25 mg (25 mg Oral $Given 1/16/25 1315)   melatonin tablet 3 mg (has no administration in time range)   hydrOXYzine HCl (ATARAX) tablet 25 mg (has no administration in time range)   OLANZapine (zyPREXA) tablet 10 mg (10 mg Oral $Given 1/16/25 2025)   acetaminophen (TYLENOL) tablet 975 mg (has no administration in time range)   ibuprofen (ADVIL/MOTRIN) tablet 600 mg (600 mg Oral $Given 1/16/25 1313)   haloperidol lactate (HALDOL) injection 5 mg (5 mg Intramuscular Not Given 1/14/25 1642)   LORazepam (ATIVAN) injection 2 mg (2 mg Intramuscular Not Given 1/14/25 1643)       Laboratory (reviewed and interpreted):  Labs Ordered and Resulted from Time of ED Arrival to Time of ED Departure   COMPREHENSIVE METABOLIC PANEL - Abnormal       Result Value    Sodium 143      Potassium 4.8       Carbon Dioxide (CO2) 32 (*)     Anion Gap 9      Urea Nitrogen 18.1      Creatinine 0.72      GFR Estimate >90      Calcium 10.0      Chloride 102      Glucose 111 (*)     Alkaline Phosphatase 83      AST 49 (*)     ALT 61 (*)     Protein Total 7.2      Albumin 4.3      Bilirubin Total 0.7     CBC WITH PLATELETS AND DIFFERENTIAL - Abnormal    WBC Count 4.6      RBC Count 4.86      Hemoglobin 16.0 (*)     Hematocrit 46.7      MCV 96      MCH 32.9      MCHC 34.3      RDW 11.9      Platelet Count 338      % Neutrophils 42      % Lymphocytes 42      % Monocytes 12      % Eosinophils 2      % Basophils 2      % Immature Granulocytes 0      NRBCs per 100 WBC 0      Absolute Neutrophils 1.9      Absolute Lymphocytes 1.9      Absolute Monocytes 0.5      Absolute Eosinophils 0.1      Absolute Basophils 0.1      Absolute Immature Granulocytes 0.0      Absolute NRBCs 0.0     URINE DRUG SCREEN PANEL - Abnormal    Amphetamines Urine Screen Positive (*)     Barbituates Urine Screen Negative      Benzodiazepine Urine Screen Negative      Cannabinoids Urine Screen Negative      Cocaine Urine Screen Negative      Fentanyl Qual Urine Screen Negative      Opiates Urine Screen Negative      PCP Urine Screen Negative     SALICYLATE LEVEL - Normal    Salicylate <0.3     ETHYL ALCOHOL LEVEL - Normal    Alcohol ethyl <0.01     INFLUENZA A/B, RSV AND SARS-COV2 PCR - Normal    Influenza A PCR Negative      Influenza B PCR Negative      RSV PCR Negative      SARS CoV2 PCR Negative         ED Course:  7:15 AM   I met with the patient and discussed plan with care team.     12:47 PM  Informed that bed is available at [facility] and anticipate transfer for mental health stabilization within the hour      Impression/Plan:  1. Suicidal ideation        MD Michelle Maldonado Scott E., MD  01/17/25 8000

## 2025-01-17 NOTE — PROGRESS NOTES
"Triage & Transition Services, Extended Care     Therapy Progress Note    Patient: Leta goes by \"Leta,\" uses she/her pronouns  Date of Service: January 17, 2025  Site of Service: [facility] EMERGENCY DEPARTMENT                             [id number]  Patient was seen yes  Mode of Assessment: Virtual: NGDATA    Presentation Summary: Pt is seen by extended care for therapeutic check-in and reassessment. Exchanged greeting, introduced self and role. Pt was observed to be able to participate in the assessment as evidenced by verbal consent. Pt reports that for several weeks she has felt increasingly anxious, hopeless, and confused. She has passive SI. She endorses meth use two days prior to this ER visit. She is unable to identify if meth contributes to her mental health symptoms. She reports \"everything is overwhelming and nothing is going right\", but was unable to give writer specific examples. Pt states \"I don't know what to think or how to think. My thoughts are going and going. My brain is overwhelmed. I want help. I have been here for two years and still don't have anyone to talk to.\" Pt continues to endorse feelings of sadness, depression, and hopelessness. When asked about suicide she states \"I wish i was never born.\" Pt indicates she has been achieving sleep and her appetite has been ok. She becomes verbally and physically frustrated with this interview. She shakes her hands in frustration and indicates she doesn't want to talk anymore. She denies HI, SIB, or AVHs. She denies having a plan for suicide. Writer ends the interview.    Therapeutic Intervention(s) Provided: Engaged in guided discovery, explored patient's perspectives and helped expand them through socratic dialogue.    Current Symptoms: racing thoughts, excessive worry, anxious avoidance, low self esteem, impaired decision making, irritable, hoplessness, helplessness, crying or feels like crying, negativistic, excessive " guilt racing thoughts, anxious, excessive worry        Mental Status Exam   Affect: Constricted  Appearance: Disheveled  Attention Span/Concentration: Attentive  Eye Contact: Variable    Fund of Knowledge: Appropriate   Language /Speech Content: Fluent  Language /Speech Volume: Soft  Language /Speech Rate/Productions: Normal  Recent Memory: Variable  Remote Memory: Variable  Mood: Anxious, Depressed, Irritable  Orientation to Person: Yes   Orientation to Place: Yes  Orientation to Time of Day: Yes  Orientation to Date: No     Situation (Do they understand why they are here?): Yes  Psychomotor Behavior: Normal  Thought Content: Suicidal  Thought Form: Intact    Treatment Objective(s) Addressed: rapport building, processing feelings, assessing safety, building self-esteem, identifying additional supports    Patient Response to Interventions: acceptance expressed, verbalizes understanding    Progress Towards Goals: Patient Reports Symptoms Are: ongoing    Patient Progress Toward Goals: is not making progress    Comment: Patient is not amenable to prolonged conversation. Patient wants to get help, wants someone to talk to. Patient could benefit from acute stabilzation.    Next Step to Work Toward Discharge: symptom stabilization  Symptom Stabilization Comment: Inpatient hospitalization. She is on a worklist for placement.    Case Management:      Plan: inpatient mental health  yes provider, RN Continue to recommend [facility] treatment services.  yes    Clinical Substantiation: Recommendation remains for inpatient hospitalization for safety, stabilization, and further assessment. Patient initially presented to the ED via EMS for concerns of altered mental status, agitation, depression, and anxiety. Patient does not feel safe, has passive suicidal ideation, continues to experience high levels of anxiety and fear. Due to the acute risk of harm to self inpatient hospitalization is needed for safety and stabilization. Patient was  seen by JOLIE Moya who also supports inpatient placement.    Legal Status: Legal Status: Voluntary/Patient has signed consent for treatment    Session Status: Time session started: 0958  Time session ended: 1007  Session Duration (minutes): 9 minutes  Session Number: 1  Anticipated number of sessions or this episode of care: 3    Time Spent: 9 minutes    CPT Code: CPT Codes: Non-Billable    Diagnosis:   Patient Active Problem List   Diagnosis Code    Homeless single person Z59.00    Injury of right knee S89.91XA    Ulcer of esophagus with bleeding K22.11    Major depressive disorder, recurrent episode, moderate (H) F33.1    Polysubstance abuse (H) F19.10       Primary Problem This Admission: Active Hospital Problems    *Major depressive disorder, recurrent episode, moderate (H)      Polysubstance abuse (H)    F33.1      Frank Del Real, [facility]   Licensed Mental Health Professional (LMHP), [facility]  [phone]

## 2025-01-17 NOTE — PLAN OF CARE
01/17/25 1453   Patient Belongings   Did you bring any home meds/supplements to the hospital?  Yes   Disposition of meds  Sent to security/pharmacy per site process   Patient Belongings remains with patient;locker;sent to security per site process   Patient Belongings Remaining with Patient clothing   Patient Belongings Put in Hospital Secure Location (Security or Locker, etc.) cash/credit card;cell phone/electronics;clothing;jewelry;keys;money (see comment);purse/wallet;shoes;wallet;watch;other (see comments)   Belongings Search Yes   Clothing Search No   Second Staff kvng astorga   Comment pt refused to remove and conduct clothing search     With pt: clothing pt wore into unit, pt refused clothing search therefore clothing is unable to be documented     In locker: boots, belt, hat, sweatshirt, portfolio containing papers, mittens, lighter (2), keys (3), phone, small bags with assorted tools (5), stuffed animal, wallet, coin purse with assorted change, birth certificate, notebooks (3), [place] drivers license, flashlight (3), glasses, charging block, misc. hygiene products, misc. writing utensils, watch, misc. jewelry making supplies     Sent to security:  Env. [id number] containing [place] EBT 0916, social security card [id number], 5 $100 bills totaling $500  [place] env. [id number] containing pocket knives (2)    Sent to pharmacy:  Medication env. [id number] containing albuterol inhaler and epinephrine injection     Pt arrived with one small bag of marijuana which was destroyed/disposed of per pt request.    A               Admission:  I am responsible for any personal items that are not sent to the safe or pharmacy.  [facility] is not responsible for loss, theft or damage of any property in my possession.    Signature:  _________________________________ Date: _______  Time: _____                                              Staff Signature:  ____________________________ Date: ________  Time: _____      2nd Staff person, if  patient is unable/unwilling to sign:    Signature: ________________________________ Date: ________  Time: _____     Discharge:  [facility] has returned all of my personal belongings:    Signature: _________________________________ Date: ________  Time: _____                                          Staff Signature:  ____________________________ Date: ________  Time: _____

## 2025-01-18 PROCEDURE — 124N000002 HC R&B MH UMMC

## 2025-01-18 PROCEDURE — 250N000013 HC RX MED GY IP 250 OP 250 PS 637: Performed by: PSYCHIATRY & NEUROLOGY

## 2025-01-18 RX ADMIN — OLANZAPINE 7.5 MG: 5 TABLET, FILM COATED ORAL at 19:51

## 2025-01-18 RX ADMIN — OLANZAPINE 7.5 MG: 5 TABLET, FILM COATED ORAL at 08:47

## 2025-01-18 ASSESSMENT — ACTIVITIES OF DAILY LIVING (ADL)
ADLS_ACUITY_SCORE: 64
ADLS_ACUITY_SCORE: 84
ADLS_ACUITY_SCORE: 64
ADLS_ACUITY_SCORE: 84
ADLS_ACUITY_SCORE: 84
ADLS_ACUITY_SCORE: 64
ADLS_ACUITY_SCORE: 84
ADLS_ACUITY_SCORE: 84
ADLS_ACUITY_SCORE: 64
ADLS_ACUITY_SCORE: 84
ADLS_ACUITY_SCORE: 64
ADLS_ACUITY_SCORE: 64
ADLS_ACUITY_SCORE: 84
ADLS_ACUITY_SCORE: 64
DRESS: INDEPENDENT
ADLS_ACUITY_SCORE: 64
ADLS_ACUITY_SCORE: 64
ADLS_ACUITY_SCORE: 84
ORAL_HYGIENE: INDEPENDENT
ORAL_HYGIENE: INDEPENDENT
ADLS_ACUITY_SCORE: 84
HYGIENE/GROOMING: HANDWASHING;INDEPENDENT;SHOWER
HYGIENE/GROOMING: INDEPENDENT
ADLS_ACUITY_SCORE: 84
ADLS_ACUITY_SCORE: 64
DRESS: SCRUBS (BEHAVIORAL HEALTH);INDEPENDENT
ADLS_ACUITY_SCORE: 64

## 2025-01-18 NOTE — PLAN OF CARE
Problem: Anxiety  Goal: Anxiety Reduction or Resolution  Outcome: Progressing  Intervention: Promote Anxiety Reduction  Recent Flowsheet Documentation  Taken 1/17/2025 1800 by Wai Valdez RN  Complementary Therapy: (refusing) other (see comments)  Family/Support System Care:   involvement promoted   presence promoted   self-care encouraged   support provided     Problem: Depression  Goal: Decreased Depression Symptoms  Outcome: Progressing  Intervention: Monitor and Manage Depressive Symptoms  Recent Flowsheet Documentation  Taken 1/17/2025 1800 by Wai Valdez RN  Complementary Therapy: (refusing) other (see comments)  Family/Support System Care:   involvement promoted   presence promoted   self-care encouraged   support provided     Problem: Suicidal Behavior  Goal: Suicidal Behavior is Absent or Managed  Outcome: Progressing   Goal Outcome Evaluation:    Plan of Care Reviewed With: patient      Patient had an uneventful evening. She again refused to be searched and spent the entire shift sleeping in her room.  She continued on SIO related to her refusing to be searched. She still wears her street clothes. Hospital searched policy during admission was explained, and patient still refused to be searched. She also refused vitals, but requested and ate 100% dinner. She appeared unkempt and refused shower. Staff encouraged & supported patient self care needs

## 2025-01-18 NOTE — PLAN OF CARE
Problem: Sleep Disturbance  Goal: Adequate Sleep/Rest  Outcome: Progressing   Goal Outcome Evaluation:    Patient appears to have slept a total of 7 hours. Safety/environment checks conducted every 15 minutes with no concerns noted. No complaints of pain/discomfort.      On SIO due to refusing to cooperate with safety search.

## 2025-01-18 NOTE — PLAN OF CARE
" INITIAL PSYCHOSOCIAL ASSESSMENT AND NOTE    Information for assessment was obtained from:       [x]Patient     []Parent     []Community provider    [x]Hospital records   []Other     []Guardian       Presenting Problem:  Patient is a 59 year old female who uses she/her. Patient was admitted to [facility] on 1/17/2025 [facility] voluntarily.    Presenting issues and presentation for admit: Per LM/DEC assessment:   Pt presents to ED via EMS for concerns of altered mental status, agitation, and depressive and anxious symptoms. Pt is homeless, and per triage note, police were called to a [place] after bystanders became concerned about pt's behavior. She reported to police and paramedics that she was having thoughts of hurting other people and has been for several weeks. Upon assessment, pt is oriented to person and day of the week, but not to place or date. Pt was sitting on the floor of her room in the corner crying and asked  \"what am I doing here?\".  Pt spoke rapidly and mumbled, making it difficult to gather history. Pt also reported that she did not know the answers to several of writer's questions, including if she had any current services. She also reported she did not know what happened at [place] prior to coming in.       It is the recommendation of this clinician that pt admit to [facility] for safety and stabilization. Pt presents to ED via EMS for concerns of altered mental status, agitation, and depressive and anxious symptoms. Pt endorses following risk factors that make inpatient level of care appropriate: not oriented to place, date, situation and cannot remember why she was brought to ED, lack of support system, no outpatient services in place, generalized thoughts of harm to others,  and was unable to safety plan. Pt is in agreement with plan.     Per H&P by Dr. Coy Vasquez MD:  Jody E Danial was interviewed in her room. She initially asked the " "medical students how old they were. She was reserved, and she feels uncomfortable with all the students in the room. Two students left. She confided in the remaining team members that she is not having a good time ajusting to [place]. She feels that people here are rude and she struggles to drive on ice and is frustrated by this as it is hard to go anywhere.      Regarding her mental health, she feels it is the same as her last admission and she cannot get her brain to change and she does not like it here and she cannot find a job and feels disoriented here. She does not have many connections here. She moved here for the man she was dating. Her sleep has been \"crappy, always crappy.\"     From her last admission she was discharged with medications that she has not been taking due to the heavy feeling they gave her. Does not take any medications at all. She would like zyprexa to sleep tonight.      She has had no visual or auditoy hallucinations but endorses \"stress screaming out of (her) ears.\" This stress is caused by everything including difficultly adjusting to this environment.      She has been staying in her car. She says she has it set up for cold with propane heater and fan. She does not have any delusions of persecution but talks about drones are everywhere but it does not feel like they are targeting her. She endorses lots of depression, and a couple attempts at sucide, fear of hell has prevented her from following through. She is hoping to \"get a little more sanity out of her stay here.\" She does not know of a family history of mental illness. But her brother almost  by sucide, his wife was killed while he was overseas in the .      She understands her past diagnoses to be depression and PTSD and thinks these are accurate, \"to a point.\" Her substance use includes alcohol. She has been buying a pint multiple times a week to everyday. She knows of no history of alchohol withdrawal. Her last " "drink was 2-3 days ago. She has no withdrawal symptoms as of now. She also got high a couple times.      She has no family she is still connected to. She is not with the boyfriend she moved here with and not sure what their relationship status is. Feels safe with him but there has been difficulty between them for the last couple months caused by stress.     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 Her role is clean up.     She reports her R knee is \"fucked up\" but no heart or lung issues as she understands.      She wishes to contact her  from her last admission, Marissa Pinon. She states that she has dissapeared for a while.    The following areas have been assessed:    History of Mental Health and Chemical Dependency:  Mental Health History:  Patient has a historical diagnosis of: MDD, GASTON, PTSD, and substance use disorders: alcohol and methamphetamine use.   The patient has a history of suicide attempts.   Patient  has a history of engaged in non-suicidal self-injury.     Previous psychiatric hospitalizations and treatments (including outpatient, residential, and inpatient care:  Multiple admissions at [facility]. Most recent being in November/2024.     Substance Use History  Per   Alcohol: Endorses 1 pint nearly everyday       Nicotine: Denies      Illicit Substances: Endorses  current or past addiction to methamphetamine     Chemical Dependency Treatment: Endorses history of chemical dependency treatment       Patient's current relationship status is   single.   Patient reported having two children who are .       Family Description (Constellation, significant information and events, Family Psychiatric History):   Pt was raised in [place].     Significant Medical issues, Life events or Trauma history:   Long-standing person who has been un housed. Endorses a history of trauma.       Living Situation:  Patient's current living/housing situation is " homelessness.   .        Educational Background:    Patient's highest education level was high school graduate. Patient reports they are  able to understand written materials.     Occupational and Financial Status:     Patient is currently  works with a tree trimming business .  Patient reports  income is obtained through sporadic work.  Patient does identify finances as a current stressor. They are insured under [employer] [place] [facility]. Restrictions (No/Yes): unknown      Legal Concerns (current or past history):       Current Concerns: denies    Past History: denies       Legal Status:  [address] Commitment history with [facility] ([phone]) started in   *likely    File Number: [id number]    Commitment History: see above        Service History: n/a    Ethnic/Cultural/Spiritual considerations: No ethnic or cultural considerations. Contextual influences on patient's health include severity of symptoms, housing instability, lack of social support, safety concerns, level of functioning, and medication adherence concerns.   Patient identified their preferred language to be English. Patient reported they do not need the assistance of an .  Spiritual considerations include: none    Social Functioning (organizations, interests, support system):   In their free time, patient reports they like to not sure.      Patient identified no one as part of their support system.  Patient identified the quality of these relationships as poor.       Current Treatment Providers are:    /ACT Team:  Marissa [phone]    Other contact information (family, friends, SO) and ALYCIA status: Would like to continue to work with Marissa at [facility], if possible.       GOALS FOR HOSPITALIZATION:  What do patient want to accomplish during this hospitalization to make things better for the patient.?   Patient priorities:  The patient reported that what is most important to them is  stabilizing. They identified stabilization as a goal of this hospitalization.    Social Service Assessment/Plan:  Patient view:   Patient reports it is important for the care team to know: doesn't know.  Upon discharge, they anticipate needing would like to discuss with treatment team on Monday or Tuesday to help them  set it up for them.      Strengths and Assets:  The patient uses these coping skills to help with stress and hard times: doesn't know.          Patient will have psychiatric assessment and medication management by the psychiatrist. Medications will be reviewed and adjusted per DO/MD/APRN CNP as indicated. The treatment team will continue to assess and stabilize the patient's mental health symptoms with the use of medications and therapeutic programming. Hospital staff will provide a safe environment and a therapeutic milieu. Staff will continue to assess patient as needed. Patient will participate in unit groups and activities. Patient will receive individual and group support on the unit.      CTC will do individual inpatient treatment planning and after care planning. CTC will discuss options for increasing community supports with the patient. CTC will coordinate with outpatient providers and will place referrals to ensure appropriate follow up care is in place.

## 2025-01-18 NOTE — PLAN OF CARE
Problem: Anxiety  Goal: Anxiety Reduction or Resolution  Outcome: Progressing  Intervention: Promote Anxiety Reduction  Recent Flowsheet Documentation  Taken 1/18/2025 1636 by Wai Valdez RN  Complementary Therapy: (refusing) other (see comments)  Family/Support System Care:   involvement promoted   presence promoted   self-care encouraged   support provided     Problem: Depression  Goal: Decreased Depression Symptoms  Outcome: Progressing  Intervention: Monitor and Manage Depressive Symptoms  Recent Flowsheet Documentation  Taken 1/18/2025 1636 by Wai Valdez RN  Complementary Therapy: (refusing) other (see comments)  Family/Support System Care:   involvement promoted   presence promoted   self-care encouraged   support provided   Goal Outcome Evaluation:    Plan of Care Reviewed With: patient      Patient isolated self to room and in bed majority of the shift. She was out in the milieu briefly during dinner, but immediately went back to room after collecting her dinner tray. She rated depression and anxiety @ 8; and complained of having racing thoughts generated by nightmares/ bad dreams during sleep; and was moderately agitated during assessment. Scheduled Zyprexa 10 mg was administered for, which appeared to be effective as evidence by patient showing no abnormal behaviors the rest of the shift. Writer advised patient to attend group and to socialize more with peers, but she declined. Writer also tried to take her vitals, but she refused. Overall, no significant abnormal behavior was noted. Will continue to monitor and encourage.

## 2025-01-18 NOTE — PLAN OF CARE
"Goal Outcome Evaluation:    Pt is calm, coop, allowed admit search-sio dc'd. Affect is bland, mood is \"heavy.\" Pt rates depression 10/10, anxiety 8, denies si, sib thoughts, \"right now.\" She contracts for safety. Pt also endorses racing thoughts, difficulty getting to and staying asleep, as well as bad dreams. She reports she is \"really tired\" from her meds. Pt said she is not sure if she'll attend any grps that are held, and may nap today. She was out for bkfst, showered, and is resting in her rm. Her goal for the day is to talk to a provider re an allergy med.    Pt has been resting/napping in her rm for most of shift, wears headphones. She was up for meals. Calm and denies needs. Elevated AM BP recheck was improved.                        "

## 2025-01-19 PROCEDURE — 124N000002 HC R&B MH UMMC

## 2025-01-19 PROCEDURE — 250N000013 HC RX MED GY IP 250 OP 250 PS 637: Performed by: PSYCHIATRY & NEUROLOGY

## 2025-01-19 PROCEDURE — 250N000013 HC RX MED GY IP 250 OP 250 PS 637

## 2025-01-19 RX ORDER — LIDOCAINE 4 G/G
1 PATCH TOPICAL
Status: DISCONTINUED | OUTPATIENT
Start: 2025-01-19 | End: 2025-01-19

## 2025-01-19 RX ORDER — LIDOCAINE 4 G/G
1 PATCH TOPICAL
Status: DISCONTINUED | OUTPATIENT
Start: 2025-01-19 | End: 2025-01-24 | Stop reason: HOSPADM

## 2025-01-19 RX ORDER — LIDOCAINE 4 G/G
1 PATCH TOPICAL
Status: CANCELLED | OUTPATIENT
Start: 2025-01-19

## 2025-01-19 RX ADMIN — OLANZAPINE 7.5 MG: 5 TABLET, FILM COATED ORAL at 19:36

## 2025-01-19 RX ADMIN — NAPROXEN 250 MG: 250 TABLET ORAL at 14:35

## 2025-01-19 RX ADMIN — OLANZAPINE 7.5 MG: 5 TABLET, FILM COATED ORAL at 08:30

## 2025-01-19 RX ADMIN — LIDOCAINE 1 PATCH: 4 PATCH TOPICAL at 05:39

## 2025-01-19 ASSESSMENT — ACTIVITIES OF DAILY LIVING (ADL)
ADLS_ACUITY_SCORE: 64
DRESS: INDEPENDENT
ADLS_ACUITY_SCORE: 64
HYGIENE/GROOMING: INDEPENDENT
ADLS_ACUITY_SCORE: 64
ADLS_ACUITY_SCORE: 64
DRESS: INDEPENDENT
ADLS_ACUITY_SCORE: 64
ORAL_HYGIENE: INDEPENDENT
HYGIENE/GROOMING: INDEPENDENT
ADLS_ACUITY_SCORE: 64
ADLS_ACUITY_SCORE: 64
ORAL_HYGIENE: INDEPENDENT
ADLS_ACUITY_SCORE: 64

## 2025-01-19 NOTE — PLAN OF CARE
"  Problem: Anxiety  Goal: Anxiety Reduction or Resolution  Outcome: Progressing  Intervention: Promote Anxiety Reduction  Recent Flowsheet Documentation  Taken 1/19/2025 1700 by Wai Valdez RN  Complementary Therapy: (refusing) other (see comments)  Family/Support System Care:   involvement promoted   presence promoted   self-care encouraged   support provided     Problem: Depression  Goal: Decreased Depression Symptoms  Outcome: Progressing  Intervention: Monitor and Manage Depressive Symptoms  Recent Flowsheet Documentation  Taken 1/19/2025 1700 by Wai Valdez RN  Complementary Therapy: (refusing) other (see comments)  Family/Support System Care:   involvement promoted   presence promoted   self-care encouraged   support provided   Goal Outcome Evaluation:    Plan of Care Reviewed With: patient        Patient continue to self isolate to room and in bed; She is seen in the milieu mostly during meals;.she reported a knee pain of 5/10 and endorsed an anxiety and a depression rate of 6/10. PRN hydroxyzine 25 mg and PRN tylenol  650 mg offered and patient declined saying \"These medications are useless. Its a waste of my time taking them. I need stronger medications.\" Patient however did take scheduled Zyprexa 7.5 mg and an ice pack for knee pain, which was effective. Vitals were stable and patient was alert and oriented. Patient was overall calm , but needed prompting to perform basic ADLs. Will continue to encourage     "

## 2025-01-19 NOTE — PLAN OF CARE
"Pt has been withdrawn and keeps to self. Pt said she had trouble staying and falling asleep. 6.5 hours of sleep recorded. Pt states she eats ok. Pt ate breakfast and lunch.  Pt has racing thoughts. Pt denies hallucinations. Pt states her anxiety is \"all right\" Pt denies need for intervention. Pt uses crosswords keep herself busy. Pt states her racing thoughts make it difficult to do anything including her crosswords. Unable to finish admission profile. Pt wants assistance with finding a place to discharge to, Pt was tearful stating she can not return. Pt has right knee pain and patch was applied on overnight. Pt whispered to writer when she first met her. Pt was medication compliant.  Pt requested naproxin for right knee pain 7/10 with 10 being high and was given 1430. Let pt know she can have ice pack or hot pack. Pt states she will use a hot pack tonight.     Goal Outcome Evaluation: ongoing                         "

## 2025-01-19 NOTE — PLAN OF CARE
Problem: Sleep Disturbance  Goal: Adequate Sleep/Rest  Outcome: Progressing   Goal Outcome Evaluation:    Patient appears to have slept a total of 6.5 hours.     Given Lidocaine patch for right knee pain with mild improvement.     Safety/environment checks conducted every 15 minutes with no further concerns noted.

## 2025-01-20 PROCEDURE — 124N000002 HC R&B MH UMMC

## 2025-01-20 PROCEDURE — 250N000013 HC RX MED GY IP 250 OP 250 PS 637

## 2025-01-20 PROCEDURE — 97150 GROUP THERAPEUTIC PROCEDURES: CPT | Mod: GO

## 2025-01-20 PROCEDURE — 250N000013 HC RX MED GY IP 250 OP 250 PS 637: Performed by: PSYCHIATRY & NEUROLOGY

## 2025-01-20 RX ORDER — DIPHENHYDRAMINE HCL 50 MG
50 CAPSULE ORAL EVERY 6 HOURS PRN
Status: DISCONTINUED | OUTPATIENT
Start: 2025-01-20 | End: 2025-01-24 | Stop reason: HOSPADM

## 2025-01-20 RX ORDER — DIPHENHYDRAMINE HCL 50 MG
50 CAPSULE ORAL ONCE
Status: COMPLETED | OUTPATIENT
Start: 2025-01-20 | End: 2025-01-20

## 2025-01-20 RX ORDER — ALBUTEROL SULFATE 90 UG/1
2 INHALANT RESPIRATORY (INHALATION) EVERY 6 HOURS PRN
Status: DISCONTINUED | OUTPATIENT
Start: 2025-01-20 | End: 2025-01-24 | Stop reason: HOSPADM

## 2025-01-20 RX ADMIN — NICOTINE POLACRILEX 2 MG: 2 GUM, CHEWING BUCCAL at 09:09

## 2025-01-20 RX ADMIN — OLANZAPINE 7.5 MG: 5 TABLET, FILM COATED ORAL at 19:16

## 2025-01-20 RX ADMIN — LIDOCAINE 1 PATCH: 4 PATCH TOPICAL at 07:06

## 2025-01-20 RX ADMIN — OLANZAPINE 7.5 MG: 5 TABLET, FILM COATED ORAL at 08:36

## 2025-01-20 RX ADMIN — DIPHENHYDRAMINE HYDROCHLORIDE 50 MG: 50 CAPSULE ORAL at 12:06

## 2025-01-20 ASSESSMENT — ACTIVITIES OF DAILY LIVING (ADL)
ADLS_ACUITY_SCORE: 64
HYGIENE/GROOMING: INDEPENDENT
ADLS_ACUITY_SCORE: 64
ADLS_ACUITY_SCORE: 64
ORAL_HYGIENE: INDEPENDENT
ADLS_ACUITY_SCORE: 64
ORAL_HYGIENE: INDEPENDENT
DRESS: INDEPENDENT
ADLS_ACUITY_SCORE: 64
LAUNDRY: UNABLE TO COMPLETE
ADLS_ACUITY_SCORE: 64
HYGIENE/GROOMING: INDEPENDENT
ADLS_ACUITY_SCORE: 64
DRESS: INDEPENDENT
ADLS_ACUITY_SCORE: 64
ADLS_ACUITY_SCORE: 64

## 2025-01-20 NOTE — PLAN OF CARE
"Goal Outcome Evaluation:    Problem: Adult Behavioral Health Plan of Care  Goal: Patient-Specific Goal (Individualization)  Description: You can add care plan individualizations to a care plan. Examples of Individualization might be:  \"Parent requests to be called daily at 9am for status\", \"I have a hard time hearing out of my right ear\", or \"Do not touch me to wake me up as it startles  me\".  Outcome: Progressing  Goal: Adheres to Safety Considerations for Self and Others  Outcome: Progressing  Goal: Absence of New-Onset Illness or Injury  Outcome: Progressing  Goal: Optimized Coping Skills in Response to Life Stressors  Outcome: Progressing     Problem: Anxiety  Goal: Anxiety Reduction or Resolution  Outcome: Progressing     Problem: Suicidal Behavior  Goal: Suicidal Behavior is Absent or Managed  Outcome: Progressing     Pt presented with a euthymic affect. She is cooperative, medication compliant. Pt is able to verbalize needs. She is appreciative that she can talk to a peer today and she  had been with the select peer this morning, doing activities together - paper folding and coloring. Pt was able to join OT clinic group. She complained of upper lip swelling. On call notified and she came to check in on her. New orders for diphenhydramine, albuterol and epinephrine. Pt endorsed anxiety and depression, denied SI and hallucinations.  Pt is eating and drinking adequately. No behavioral concerns this shift.    "

## 2025-01-20 NOTE — INTERIM SUMMARY
"MD notified that patient was having upper lip swelling. Assessed patient at bedside, who reported a longstanding history of allergies  - Noticed that she had some moderate upper lip swelling extending to roof of mouth after drinking coffee with creamer, notes that similar symptoms have occurred in the past. While not in the hospital takes \"two pills\" of benadryl as needed to manage symptoms and carries an epi-pen with her at all times. Requesting that benadryl, epi-pen, and albuterol be made available for her as needed for symptom management and would like benadryl right now. Denies symptoms elsewhere except for mouth.     Exam unremarkable outside of some mild swelling noted in roof of mouth partially extending to lateral molars. Lips do not appear swollen however tingling described inside internal lip. No foreign body or purulence noted, patient is reassuringly breathing normally without SOB and airways do not appear to be impacted. Symptoms do not appear to be spreading to other areas and symptoms most likely correlated to a mild contact allergic reaction.     Given that patient reports two pills as effective historically at home and has does not endorse sedation with doses in the past, ordered 50mg. Also made epi pen available as a PRN and, given history of asthma, made albuterol available PRN as well.    Patient reassessed after dose given and appears to be doing well, no concerns.     Aditi Garcia MD  Psychiatry Resident, PGY2    "

## 2025-01-20 NOTE — DISCHARGE INSTRUCTIONS
Behavioral Discharge Planning and Instructions    Summary: You were admitted on 1/17/2025 due to concern for SI, HI, and aggression in the context of substance use.  You were treated by Coy Vasquez MD and [facility] on 1/24/2025 [facility] ([facility]: [address] [place] [id number]).     Main Diagnosis:   MDD  Alcohol use disorder  Methamphetamine use  Substance induced psychosis    Health Care Follow-up:   No follow up appointments were scheduled as your care will be managed by [facility]. Please work with their team on scheduling any outpatient appointments after you complete their program.      Major Treatments, Procedures and Findings:  You were provided with: a psychiatric assessment, assessed for medical stability, medication evaluation and/or management, group therapy, individual therapy, CD evaluation/assessment, milieu management, and medical interventions    Symptoms to Report: feeling more aggressive, increased confusion, losing more sleep, or mood getting worse    Early warning signs can include: increased depression or anxiety sleep disturbances increased thoughts or behaviors of suicide or self-harm  increased unusual thinking, such as paranoia or hearing voices    Safety and Wellness:  Take all medicines as directed.  Make no changes unless your doctor suggests them.      Follow treatment recommendations.  Refrain from alcohol and non-prescribed drugs.  If there is a concern for safety, call 911.    Resources:   Mental Health Crisis Resources  Throughout [place]: call **CRISIS (**131414)  Crisis Text Line: is available for free, 24/7 by texting [place] to 017883  Suicide Awareness Voices of Education (SAVE) (www.save.org): 736-046-THBV (7294)  The National Suicide Prevention Lifeline is now: 988 Suicide and Crisis Lifeline. Call 988 anytime.  National New Century on Mental Illness (www.[place].randy.org): 444-679-0612 or 847-589-3888.  Ujrm2szqs: text the word  LIFE to 96808 for immediate support and crisis intervention  Mental Health Consumer/Survivor Network of [place] (www.mhcsn.net): 352.908.8420 or 393-134-7793  Mental Health Association of [place] (www.mentalhealth.org): 534.210.8425 or 857-523-0111  Peer Support Connection [place] Warmline (PSC) 1-766.169.5869 Available from 5pm - 9am (7 days a week/365 days a year)  [facility] 1-369.364.8427 Community Outreach for Psych Emergencies      General Medication Instructions:   See your medication sheet(s) for instructions.   Take all medicines as directed.  Make no changes unless your doctor suggests them.   Go to all your doctor visits.  Be sure to have all your required lab tests. This way, your medicines can be refilled on time.  Do not use any drugs not prescribed by your doctor.  Avoid alcohol.    Advance Directives:   Scanned document on file with [facility]?    Is document scanned?    Honoring Choices Your Rights Handout:    Was more information offered?      The Treatment team has appreciated the opportunity to work with you. If you have any questions or concerns about your recent admission, you can contact the unit which can receive your call 24 hours a day, 7 days a week. They will be able to get in touch with a Provider if needed. The unit number is [phone].

## 2025-01-20 NOTE — PLAN OF CARE
Problem: Adult Behavioral Health Plan of Care  Goal: Plan of Care Review  Outcome: Progressing     Problem: Psychotic Signs/Symptoms  Goal: Improved Behavioral Control (Psychotic Signs/Symptoms)  Outcome: Progressing   Goal Outcome Evaluation:    Plan of Care Reviewed With: patient       Pt continues to be isolative and withdrawn to self, she spend majority of the time for the first few hrs of the shift sleeping/resting in her room, she came out for dinner only, upon approach, she was calm pleasant and cooperative, she endorsed anxiety/depression 5/10 but declined intervention, she denied all other psych symptoms, endorsed knee pain 4/10, declined intervention also and stated the tylenol doesn't do anything. Pt is medication compliant, hygiene is unkempt, was offered to take a shower but declined, flood/fluid adequate, no behavior issues or safety concerns.

## 2025-01-20 NOTE — PLAN OF CARE
Team Note Due:  Tuesday    Assessment/Intervention/Current Symtoms and Care Coordination:  Chart review and met with team, discussed pt progress, symptomology, and response to treatment.  Discussed the discharge plan and any potential impediments to discharge.    Met with pt to introduce self and role. Pt states she would like to return to the women's CD treatment she went to previously ([facility]) and hopes she can get in touch with her former Community Health CM, Marissa. Writer provided her with Marissa's number and we discussed the process for being referred back to [facility]. We briefly discussed voluntary Community Health CM services but pt wants to work with her CM from [place] despite [facility] being her CFR. Agreed to revisit this topic another time.    Will discuss CD consult with providers tomorrow.     Discharge Plan or Goal:  Residential CD treatment     Barriers to Discharge:  Patient requires further psychiatric stabilization due to current symptomology, medication management with changes subject to provider, coordination with outside supports, and aftercare planning.      Referral Status:  None at this time     Legal Status:  Voluntary    Contacts:  N/A     Upcoming Meetings and Dates/Important Information and next steps:  CD consult  Referral to [facility] (pt preference)  Discuss Community Health CM with pt. Make referral to [facility] if pt agreeable.

## 2025-01-20 NOTE — PLAN OF CARE
Rehab Group    Start time: 10:30  End time: 11:15  Patient time total: 25 minutes    attended partial group     #3 attended   Group Type: occupational therapy   Group Topic Covered: coping skills, mindfulness, and relaxation      Group Session Detail:  Patient actively participated in a progressive muscle relaxation group in order to promote: positive relaxation and coping skills, encourage insight and self-reflection, promote a positive change, manage behaviors, and improve focus. In addition, patient was guided through proper deep breathing techniques and gentle full body movements/stretches to further promote relaxation.      Patient Response/Contribution:  listened actively and worked intermittently     Patient Detail:  Patient reported that writing is one activity she enjoys which promotes relaxation in her life. Patient appeared to intermittently participate in the guided exercises throughout group; however, occasionally appeared inattentive at times as evidenced by poor follow-through of guided movements. Patient engaged in independent/self-directed exercises intermittently as group progressed. Patient remained a quiet participant; no behavioral or disruptive behaviors observed. Patient left group early; no return.       [address] OT Group (2 or more in attendance)      Patient Active Problem List   Diagnosis    Homeless single person    Injury of right knee    Ulcer of esophagus with bleeding    Major depressive disorder, recurrent episode, moderate (H)    Polysubstance abuse (H)    Bipolar 1 disorder (H)

## 2025-01-20 NOTE — PLAN OF CARE
Problem: Sleep Disturbance  Goal: Adequate Sleep/Rest  Outcome: Progressing   Goal Outcome Evaluation:    Patient appears to have slept a total of 7 hours.     Given Lidocaine patch for right knee pain.Pending reassessment. Requested medication for nasal cold sore. Note left for provider. Safety/environment checks conducted every 15 minutes with no further concerns noted.

## 2025-01-21 ENCOUNTER — TELEPHONE (OUTPATIENT)
Dept: BEHAVIORAL HEALTH | Facility: CLINIC | Age: 60
End: 2025-01-21
Payer: COMMERCIAL

## 2025-01-21 PROCEDURE — 97150 GROUP THERAPEUTIC PROCEDURES: CPT | Mod: GO

## 2025-01-21 PROCEDURE — 90853 GROUP PSYCHOTHERAPY: CPT

## 2025-01-21 PROCEDURE — 124N000002 HC R&B MH UMMC

## 2025-01-21 PROCEDURE — 250N000013 HC RX MED GY IP 250 OP 250 PS 637

## 2025-01-21 PROCEDURE — 99232 SBSQ HOSP IP/OBS MODERATE 35: CPT | Mod: GC | Performed by: PSYCHIATRY & NEUROLOGY

## 2025-01-21 PROCEDURE — 250N000013 HC RX MED GY IP 250 OP 250 PS 637: Performed by: PSYCHIATRY & NEUROLOGY

## 2025-01-21 RX ORDER — HYDROXYZINE HYDROCHLORIDE 25 MG/1
25 TABLET, FILM COATED ORAL EVERY 4 HOURS PRN
Status: DISCONTINUED | OUTPATIENT
Start: 2025-01-21 | End: 2025-01-24 | Stop reason: HOSPADM

## 2025-01-21 RX ORDER — POLYETHYLENE GLYCOL 3350 17 G
2 POWDER IN PACKET (EA) ORAL
Status: DISCONTINUED | OUTPATIENT
Start: 2025-01-21 | End: 2025-01-24 | Stop reason: HOSPADM

## 2025-01-21 RX ORDER — OLANZAPINE 2.5 MG/1
2.5 TABLET, FILM COATED ORAL 2 TIMES DAILY PRN
Status: DISCONTINUED | OUTPATIENT
Start: 2025-01-21 | End: 2025-01-24 | Stop reason: HOSPADM

## 2025-01-21 RX ORDER — OLANZAPINE 2.5 MG/1
2.5 TABLET, FILM COATED ORAL 2 TIMES DAILY PRN
Status: DISCONTINUED | OUTPATIENT
Start: 2025-01-21 | End: 2025-01-21

## 2025-01-21 RX ADMIN — LIDOCAINE 1 PATCH: 4 PATCH TOPICAL at 08:10

## 2025-01-21 RX ADMIN — HYDROXYZINE HYDROCHLORIDE 25 MG: 25 TABLET, FILM COATED ORAL at 14:39

## 2025-01-21 RX ADMIN — OLANZAPINE 10 MG: 10 TABLET, FILM COATED ORAL at 12:01

## 2025-01-21 RX ADMIN — OLANZAPINE 7.5 MG: 5 TABLET, FILM COATED ORAL at 08:10

## 2025-01-21 RX ADMIN — ACETAMINOPHEN 650 MG: 325 TABLET, FILM COATED ORAL at 14:34

## 2025-01-21 RX ADMIN — DIPHENHYDRAMINE HYDROCHLORIDE 50 MG: 50 CAPSULE ORAL at 05:59

## 2025-01-21 RX ADMIN — NICOTINE POLACRILEX 2 MG: 2 GUM, CHEWING BUCCAL at 10:58

## 2025-01-21 RX ADMIN — OLANZAPINE 7.5 MG: 5 TABLET, FILM COATED ORAL at 20:13

## 2025-01-21 RX ADMIN — DIPHENHYDRAMINE HYDROCHLORIDE 50 MG: 50 CAPSULE ORAL at 12:39

## 2025-01-21 RX ADMIN — NICOTINE POLACRILEX 2 MG: 2 GUM, CHEWING BUCCAL at 09:29

## 2025-01-21 RX ADMIN — NAPROXEN 250 MG: 250 TABLET ORAL at 20:37

## 2025-01-21 ASSESSMENT — ACTIVITIES OF DAILY LIVING (ADL)
ADLS_ACUITY_SCORE: 64
LAUNDRY: WITH SUPERVISION
ORAL_HYGIENE: INDEPENDENT
ADLS_ACUITY_SCORE: 64
ADLS_ACUITY_SCORE: 64
DRESS: SCRUBS (BEHAVIORAL HEALTH)
ADLS_ACUITY_SCORE: 64
HYGIENE/GROOMING: INDEPENDENT
ADLS_ACUITY_SCORE: 64

## 2025-01-21 NOTE — TELEPHONE ENCOUNTER
Summary of Patient Care Communication Handoff to Patient Navigator Coordinator    PATIENT'S NAME:  Jody Barrow  MRN:  [id number]  :   1965    DATE OF SERVICE:  25    Adult Substance Use Disorder - INPATIENT CONSULT ON ([facility])   is: Glenys Valencia who can be reached on [place] TEAMS    Level of Care Recommended:    1.)  It was recommended the patient abstain from alcohol and from all other non-prescribed mood altering chemicals.   2.)  Have a mental health evaluation to address her current clinical mental health issues while on a residential or board and lodging substance use disorder treatment program unit.  3.)  Follow all of the recommendations of her medical and mental health providers.  4.)  Enter [facility] in [place], [place] or enter a similar residential or board and lodging treatment program for co-occurring mental health and substance use disorder treatment.  5.)  Follow all of the recommendations of her co-occurring treatment providers including entering an extended care program as needed.         The patient reported she was willing to follow the above recommendations.      Schedule Preferences: NA    Referral Needed:  Community Referral: [facility]    Is this a Lodging Plus Referral?  Lodging Plus Screening: No.    Are there any potential barriers for entrance into programmatic care?  The patient is homeless and currently on an IP mental health unit.    Specialty Care Coordinator Referral Needed:  No    Release of Information Needed:    Outside IOP/OP [facility] treatment program: This patient is on an IP UNIT at [facility] - Pending ALYCIA needs to be sent to / (Glenys Valencia) on [facility].    Faxing Needed: See below:    Please fax or e-mail this patient's 2025 substance use disorder assessment to [facility] as a referral for residential substance use disorder treatment and please verify the fax or e-mail had been  successfully transmitted and document the results in a telephone note.    [facility]  Tel #: [phone]  Fax #: [phone]  Email: [email or web address]        Follow up Requests:      1.)  Community Referral: [facility].  2.)  This patient is on an IP UNIT at [facility] - Pending ALYCIA needs to be sent to / (Glenys Valencia) on [facility].    Yan Oropeza, [facility]    Patient Navigator Coordinator Contact Information  Pool Message: [email or web address] ([phone])   Phone:  [phone]  Fax:  [phone]  Email:  [email or web address]
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
## 2025-01-21 NOTE — PLAN OF CARE
Problem: Sleep Disturbance  Goal: Adequate Sleep/Rest  Outcome: Progressing   Goal Outcome Evaluation:    Patient appears to have slept a total of 6 hours.     Given PRN Benadryl for allergies. Sleeping on reassessment. Safety/environment checks conducted every 15 minutes with no other concerns noted. No complaints of pain.

## 2025-01-21 NOTE — PLAN OF CARE
Team Note Due:  Tuesday    Assessment/Intervention/Current Symtoms and Care Coordination:  Chart review and met with team, discussed pt progress, symptomology, and response to treatment.  Discussed the discharge plan and any potential impediments to discharge.    Received update from CD  that they will call pt on the unit at 10:30am to complete. Met with pt to provide update on this.    Pt completed assessment. Writer met with pt to sign ALYCIA, copy placed in paper chart. Referral sent via secure email to [facility] requesting to review for [facility].     Discharge Plan or Goal:  Residential CD treatment     Barriers to Discharge:  Patient requires further psychiatric stabilization due to current symptomology, medication management with changes subject to provider, coordination with outside supports, and aftercare planning.      Referral Status:  [facility] ([facility])     Legal Status:  Voluntary    Contacts:  N/A     Upcoming Meetings and Dates/Important Information and next steps:  Follow up on [facility] referral  Discuss [facility] with pt. Make referral to [facility] if pt agreeable.

## 2025-01-21 NOTE — PLAN OF CARE
Rehab Group    Start time: 10:30  End time: 11:45  Patient time total: 45 minutes    attended partial group    #4 attended   Group Type: OT Clinic x2   Group Topic Covered: balanced lifestyle, cognitive activities, coping skills, educational support, healthy leisure time, mindfulness, relaxation , and social skills     Group Session Detail:  Pt actively participated in occupational therapy clinic to facilitate coping skill exploration, creative expression within personally meaningful activities, and clinical observation of social, cognitive, and kinesthetic performance skills.      Patient Response/Contribution:  cooperative with task, supportive of peers, socially appropriate, attentive, and actively engaged     Patient Detail:  Patient entered group late; however, was quick to initiate a creative task without additional prompting. Patient offered instructional verbal cueing and demonstration to a peer regarding how to create origami paper boxes. Patient demonstrated a willingness to teach others; however, at times, her social approach was mildly overbearing. Patient switched tasks part way through group; focusing her attention on an independent canvas coloring project. Patient worked consistently on task with good attention to detail and follow-through. Patient was able to make her needs known and ask for supplies as needed. Patient engaged in prosocial reciprocal conversation with a select female peer. No safety or behavioral concerns identified in the group setting.      [id number] OT Group (2 or more in attendance)      Patient Active Problem List   Diagnosis    Homeless single person    Injury of right knee    Ulcer of esophagus with bleeding    Major depressive disorder, recurrent episode, moderate (H)    Polysubstance abuse (H)    Bipolar 1 disorder (H)

## 2025-01-21 NOTE — PROVIDER NOTIFICATION
01/21/25 0921   Individualization/Patient Specific Goals   Patient Personal Strengths motivated for treatment;expressive of needs;resilient   Patient Vulnerabilities poor impulse control;substance abuse/addiction;housing insecurity;history of unsuccessful treatment   Interprofessional Rounds   Summary New admit. Admitted due to concern for SI/HI in context of substance use. Pt has been living in her car and is interested in CD treatment.   Participants CTC;psychiatrist;pharmacy;nursing;other (see comments)   Behavioral Team Discussion   Participants Dr. Vasquez; Dr. Nicholas; Elana Moyer RN; Glenys Valencia [facility]; medical students; pharmacy   Progress Initial assessment   Anticipated length of stay 7-10 days   Continued Stay Criteria/Rationale Symptom stabilization, medication management, care coordination   Medical/Physical See H&P   Precautions See below   Plan Psychiatric assessment/Medication management. Therapeutic Milieu. Individual care planning and after care planning. Patient to participate in unit groups and activities. Individual and group support on unit.   Safety Plan Completed by unit therapist   Anticipated Discharge Disposition substance use treatment     PRECAUTIONS AND SAFETY    Behavioral Orders   Procedures    Code 1 - Restrict to Unit    Code 1 - Restrict to Unit    Routine Programming     As clinically indicated    Routine Programming     As clinically indicated    Status 15     Every 15 minutes.    Status 15     Every 15 minutes.       Safety  Safety WDL: WDL  Patient Location: activity room, dining room  Observed Behavior: calm, sitting  Safety Measures: safety rounds completed, environmental rounds completed  Diversional Activity:  (crosswords)  De-Escalation Techniques: reoriented  Suicidality: Status 15

## 2025-01-21 NOTE — TELEPHONE ENCOUNTER
This patient's MAREK Service Initiation DAANES information was entered directly into the [place] Department of Human Services DAANES website on 1/21/2025.  Service Initiation Date ID: [id number]

## 2025-01-21 NOTE — PROGRESS NOTES
"  ----------------------------------------------------------------------------------------------------------  [facility]  Psychiatry Progress Note  Hospital Day #4     Interim History:     The patient's care was discussed with the treatment team and chart notes were reviewed.    Vitals: VSS  Sleep: 6 hours (01/21/25 0600)  Scheduled medications: Took all scheduled medications as prescribed  Psychiatric PRN medications: Diphenhydramine 50mg x 1 (for allergic reaction)    Staff Report:   Had a mild allergic reaction consisting of lip swelling yesterday with unknown cause successfully treated with diphenhydramine. In summary, she had been sleeping adequately and withdrawn to self and unkempt. Pleasant. Please see staff notes for details.      Subjective:     Patient Interview:  Jody Barrow was seen in her room.  She says she is still a little elevated concerning her personal patience.  She continues to ruminate on the uncertainty - saying \"what if's...\"   Notes that the weekend went very slowly. She has been able to sleep which has been unusual for her.  Endorses no changes in her appetite.  Endorses thoughts of SIB that are triggered by anger.  She says things are better than the last time because the staff are more considerate with the safety checks.  Expresses wanting to try a medication for anxiety.  Expresses desire to go to in-patient residential CD ([facility]) after this hospitalization.     ROS:  Patient has no bothersome physical symptoms  Patient denies acute concerns     Objective:     Vitals:  /68 (BP Location: Right arm, Patient Position: Sitting, Cuff Size: Adult Regular)   Pulse 88   Temp 98.5  F (36.9  C) (Oral)   Resp 16   Wt 62.4 kg (137 lb 9.6 oz)   LMP  (LMP Unknown)   SpO2 97%     Allergies:  Allergies   Allergen Reactions    Codeine Anaphylaxis    Sunflower Oil Swelling and Rash    Cat Dander Hives     All animal hair       Current " Medications:  Scheduled:  Current Facility-Administered Medications   Medication Dose Route Frequency Provider Last Rate Last Admin    acetaminophen (TYLENOL) tablet 650 mg  650 mg Oral Q4H PRN Coy Vasquez MD        albuterol (PROVENTIL HFA/VENTOLIN HFA) inhaler  2 puff Inhalation Q6H PRN Aditi Garcia MD        alum & mag hydroxide-simethicone (MAALOX) suspension 30 mL  30 mL Oral Q4H PRN Coy Vasquez MD        diphenhydrAMINE (BENADRYL) capsule 50 mg  50 mg Oral Q6H PRN Aditi Garcia MD   50 mg at 01/21/25 0559    EPINEPHrine (ADRENALIN) kit 0.3 mg  0.3 mg Intramuscular Once PRN Aditi Garcia MD        hydrOXYzine HCl (ATARAX) tablet 25 mg  25 mg Oral Q4H PRN Coy Vasquez MD        Lidocaine (LIDOCARE) 4 % Patch 1 patch  1 patch Transdermal Q24H PRN Erik Shay MD   1 patch at 01/20/25 0706    naproxen (NAPROSYN) tablet 250 mg  250 mg Oral BID PRN Coy Vasquez MD   250 mg at 01/19/25 1435    nicotine (NICORETTE) gum 2 mg  2 mg Buccal Q1H PRN Aditi Garcia MD   2 mg at 01/20/25 0909    OLANZapine (zyPREXA) tablet 10 mg  10 mg Oral TID PRN Coy Vasquez MD        Or    OLANZapine (zyPREXA) injection 10 mg  10 mg Intramuscular TID PRN Coy Vasquez MD        OLANZapine (zyPREXA) tablet 7.5 mg  7.5 mg Oral BID Coy Vasquez MD   7.5 mg at 01/20/25 1916    traZODone (DESYREL) tablet 50 mg  50 mg Oral At Bedtime PRN Coy Vasquez MD           PRN:  Current Facility-Administered Medications   Medication Dose Route Frequency Provider Last Rate Last Admin    acetaminophen (TYLENOL) tablet 650 mg  650 mg Oral Q4H PRN Coy Vasquez MD        albuterol (PROVENTIL HFA/VENTOLIN HFA) inhaler  2 puff Inhalation Q6H PRN Babkin, Aditi A, MD        alum & mag hydroxide-simethicone (MAALOX) suspension 30 mL  30 mL Oral Q4H PRN Coy Vasquez MD        diphenhydrAMINE (BENADRYL) capsule 50 mg  50 mg Oral Q6H PRN Aditi Garcia MD   50 mg at 01/21/25 0559    EPINEPHrine  "(ADRENALIN) kit 0.3 mg  0.3 mg Intramuscular Once PRN Aditi Garcia MD        hydrOXYzine HCl (ATARAX) tablet 25 mg  25 mg Oral Q4H PRN Coy Vasquez MD        Lidocaine (LIDOCARE) 4 % Patch 1 patch  1 patch Transdermal Q24H PRN Erik Shay MD   1 patch at 01/20/25 0706    naproxen (NAPROSYN) tablet 250 mg  250 mg Oral BID PRN Coy Vasquez MD   250 mg at 01/19/25 1435    nicotine (NICORETTE) gum 2 mg  2 mg Buccal Q1H PRN Aditi Garcia MD   2 mg at 01/20/25 0909    OLANZapine (zyPREXA) tablet 10 mg  10 mg Oral TID PRN Coy Vasquez MD        Or    OLANZapine (zyPREXA) injection 10 mg  10 mg Intramuscular TID PRN Coy Vasquez MD        OLANZapine (zyPREXA) tablet 7.5 mg  7.5 mg Oral BID Coy Vasquez MD   7.5 mg at 01/20/25 1916    traZODone (DESYREL) tablet 50 mg  50 mg Oral At Bedtime PRN Coy Vasquez MD           Labs and Imaging:  New results:   No results found for this or any previous visit (from the past 24 hours).    Data this admission:  - CBC remarkable for hemoglobin of 16  - CMP remarkable for ALT 61 and AST 49  - UDS positive for amphetamine     Mental Status Exam:     Oriented to:  Grossly Oriented  General:  Awake and Alert  Appearance:  appears stated age and Grooming is inadequate due to not showering  Behavior/Attitude:  Calm, Cooperative, Engaged, and Guarded  Eye Contact: Appropriate and Downcast  Psychomotor: Normal no catatonia present  Speech:  appropriate volume/tone and talkative  Language: Fluent in English with appropriate syntax and vocabulary.  Mood:  \"Elevated\"  Affect:  appropriate, congruent with mood, restricted, flat, and blunted  Thought Process:  linear, coherent, and ruminative  Thought Content:   suicidal ideation (passive) and No HI; No apparent delusions  Associations:  intact  Insight:  partial due to knowledge of previous positive therapeutic environments  Judgment:  partial due to knowledge of previous positive therapeutic environments  Impulse " control: partial  Attention Span:  grossly intact  Concentration:  grossly intact  Recent and Remote Memory:  not formally assessed  Fund of Knowledge: average  Muscle Strength and Tone: normal  Gait and Station: Normal     Psychiatric Assessment     Jody Barrow is a 59 year old female previously diagnosed with MDD, GASTON alcohol and methamphetamine use who presented voluntarily with HI and MI in the context of housing instability and alcohol and methamphetamine use. Most recent psychiatric hospitalization was 11/2022. Significant symptoms on admission include depressed mood and guardedness. The MSE on admission was pertinent for flat sad mood. Biological contributions to mental health presentation include MDD, GASTON, and alcohol and methamphetamine use. Psychological contributions to mental health presentation include history of trauma, and distrust of others. Social factors contributing to mental health presentation include lack of close connections, turbulence in relationship with boyfriend and housing instability, lack of stable income. Protective factors voluntarily involving herself in inpatient treatment.      In summary, the patient's reported symptoms of depressed mood and social isolation in the context of her recent HI, SI, and substance use are consistent MDD and/vs substance induced psychosis. She will likely benefit from medication management and treatment and support resource coordination and facilitation this admission.     Given that she currently has SI and HI, patient warrants inpatient psychiatric hospitalization to maintain her safety.      Psychiatric Plan by Diagnosis      Today's changes:  - Added Olanzapine 2.5 mg PRN as first line for anxiety. Hydroxyzine 25 mg will now be utilized as 2nd line.      # MDD  # Alcohol use disorder  # Methamphetamine use  # Substance induced psychosis  1. Medications:  - Zyprexa 7.5 mg BID     2. Pertinent Labs/Monitoring:   - None     3. Additional Plans:  -  Patient will be treated in therapeutic milieu with appropriate individual and group therapies as described  - CD treatment at [facility] planned for discharge     Psychiatric Hospital Course:      Jody Barrow was admitted to [facility] 20[facility] as a voluntary patient.   Medications:  New medications started at the time of admission include:   Scheduled  Zyprexa 7.5 mg BID  PRN  Hydroxyzine 25 mg  Trazodone 50 mg   Zyprexa 10 mg  Naproxen 250 mg  Tylenol 650 mg  Maalox  01/20 diphenhydramine, epi-pen, and albuterol made available PRN for allergies  01/21 added 2.5 mg olanzapine PRN for anxiety     The risks, benefits, alternatives, and side effects were discussed and understood by the patient and other caregivers.     Medical Assessment and Plan     Medical diagnoses to be addressed this admission:    # Knee pain  - Naproxen 250 mg PRN     # Asthma   - Albuterol PRN    # Food allergy  - Diphenhydramine PRN  - Epi-pen PRN     Medical course: Patient was physically examined by the ED prior to being transferred to the unit and was found to be medically stable and appropriate for admission.      Consults:  none     Checklist     Legal Status: Voluntary    Safety Assessment:   Behavioral Orders   Procedures    Code 1 - Restrict to Unit    Code 1 - Restrict to Unit    Routine Programming     As clinically indicated    Routine Programming     As clinically indicated    Status 15     Every 15 minutes.    Status 15     Every 15 minutes.       Risk Assessment:  Risk for harm is elevated.  Risk factors: SI, HI, substance use, family history, and past behaviors  Protective factors: engaged in treatment     SIO: No    Disposition: CD residential treatment ([facility]). Pending clinical stabilization and medication optimization.     Attestations     Ariel Chang, MS3  [facility]    Resident/Fellow Attestation   I, Karly Nicholas DO, was present with the medical/VARINDER student who participated in the service and in the  documentation of the note.  I have verified the history and personally performed the physical exam and medical decision making.  I agree with the assessment and plan of care as documented in the note.      Karly Nicholas,   PGY1  Date of Service (when I saw the patient): 01/21/25    Attestation:  This patient has been seen and evaluated by me, Coy Vasquez MD.  I have discussed this patient with the house staff team including the resident and/or medical student and I agree with the findings and plan in this note.    I have reviewed today's vital signs, medications, labs and imaging. Coy Vasquez MD , PhD.

## 2025-01-21 NOTE — CONSULTS
This patient participated in a virtual substance use disorder assessment with this counselor on 1/21/2025 while on Station 20 at [facility], resulting in the below recommendations.    1.)  It was recommended the patient abstain from alcohol and from all other non-prescribed mood altering chemicals.   2.)  Have a mental health evaluation to address her current clinical mental health issues while on a residential or board and lodging substance use disorder treatment program unit.  3.)  Follow all of the recommendations of her medical and mental health providers.  4.)  Enter [facility] in [place], [place] or enter a similar residential or board and lodging treatment program for co-occurring mental health and substance use disorder treatment.  5.)  Follow all of the recommendations of her co-occurring treatment providers including entering an extended care program as needed.        The patient reported she was willing to follow the above recommendations.      A Release of Information form will be sent to the  on Station 20 for the below substance use disorder treatment program:    [facility]  Tel #: [phone]  Fax #: [phone]  Email: [email or web address]    Please contact PETE Valadez, the patient navigator, at [phone] or via Teams for any follow-up regarding this patient.    MAREK consult completed by: PETE Hernández.  Phone Number: [phone]  E-mail Address: [email or web address] Mental Health and Addiction Services Evaluation Department  [address] [place], [place] [id number]

## 2025-01-21 NOTE — PLAN OF CARE
Problem: Psychotic Signs/Symptoms  Goal: Improved Behavioral Control (Psychotic Signs/Symptoms)  Intervention: Manage Behavior  Recent Flowsheet Documentation  Taken 1/21/2025 1200 by Elana Moyer RN  De-Escalation Techniques:   stimulation decreased   verbally redirected     Problem: Pain Acute  Goal: Optimal Pain Control and Function  Outcome: Progressing   Goal Outcome Evaluation:    Plan of Care Reviewed With: patient    Patient visible in milieu, socially interactive with selected peers and doing art project, she  reported right knee pain at the start of shift and requested for Lidocaine patch, placed on right knee with some effect. Patient described mood as irritable and agitated, declining mental health symptoms and relating over stimulations in Milieu as the contributor to how she is feeling. Patient requested for PRN Zyprexa before talking to team, she reported onset of anxiety as she anticipate talking with multiple people, after her meeting she came back and reported allergic reactions stating her fingers were swollen. Writer assessed and did not noted edema in upper extremities. Patient insisted that she was experiencing allergic reactions and PRN Benadryl 50 mg given at 12:40 PM, she than started asking when staffs were taking patient down to basketball court, she was informed that such activity was not an option for inpatient. Patient has adequate intake of food and fluids, hygiene is poor and unkept, she was encouraged to do shower but declined and stated she rather do shower later in the evening. Patient participated in group activities and no concern for safety noted.

## 2025-01-21 NOTE — CONSULTS
This patient participated in a virtual substance use disorder assessment with this counselor on 1/21/2025 while on Station 20 at [facility], resulting in the below recommendations.     1.)  It was recommended the patient abstain from alcohol and from all other non-prescribed mood altering chemicals.   2.)  Have a mental health evaluation to address her current clinical mental health issues while on a residential or board and lodging substance use disorder treatment program unit.  3.)  Follow all of the recommendations of her medical and mental health providers.  4.)  Enter [facility] in [place], [place] or enter a similar residential or board and lodging treatment program for co-occurring mental health and substance use disorder treatment.  5.)  Follow all of the recommendations of her co-occurring treatment providers including entering an extended care program as needed.         The patient reported she was willing to follow the above recommendations.       A Release of Information form will be sent to the  on Station 20 for the below substance use disorder treatment program:     [facility]  Tel #: [phone]  Fax #: [phone]  Email: [email or web address]     Please contact PETE Valadez, the patient navigator, at [phone] or via Teams for any follow-up regarding this patient.     MAREK consult completed by: PETE Hernández.  Phone Number: [phone]  E-mail Address: [email or web address] Mental Health and Addiction Services Evaluation Department  [address] [place], [place] [id number]

## 2025-01-21 NOTE — PLAN OF CARE
BEH IP Unit Acuity Rating Score (UARS)  Patient is given one point for every criteria they meet.    CRITERIA SCORING   On a 72 hour hold, court hold, committed, stay of commitment, or revocation. 0    Patient LOS on BEH unit exceeds 20 days. 0  LOS: 4   Patient under guardianship, 55+, otherwise medically complex, or under age 11. 1   Suicide ideation without relief of precipitating factors. 1   Current plan for suicide. 0   Current plan for homicide. 0   Imminent risk or actual attempt to seriously harm another without relief of factors precipitating the attempt. 0   Severe dysfunction in daily living (ex: complete neglect for self care, extreme disruption in vegetative function, extreme deterioration in social interactions). 1   Recent (last 7 days) or current physical aggression in the ED or on unit. 0   Restraints or seclusion episode in past 72 hours. 0   Recent (last 7 days) or current verbal aggression, agitation, yelling, etc., while in the ED or unit. 0   Active psychosis. 0   Need for constant or near constant redirection (from leaving, from others, etc).  0   Intrusive or disruptive behaviors. 0   Patient requires 3 or more hours of individualized nursing care per 8-hour shift (i.e. for ADLs, meds, therapeutic interventions). 0   TOTAL 3

## 2025-01-21 NOTE — PLAN OF CARE
Group Attendance:  attended partial group    Time session began: 1315  Time session ended: 1400  Patient's total time in group: 20    Total # Attendees   6   Group Type psychotherapeutic     Group Topic Covered emotional regulation, symptom management, healthy coping skills, mindfulness, and relaxation and grounding techniques/coping skills     Group Session Detail Group members checked in with how they are feeling. Writer discussed the benefits of using drawing as coping skill, gave members a small piece of paper to start an exercise in doodling. Group members checked in with what they noticed during the activity.      Patient's response to the group topic/interactions:  cooperative with task, organized, socially appropriate, listened actively, expressed understanding of topic, and worked intermittently     Patient Details: Pt attended group, and started to engaged in the activity. Pt had a certain idea of what she wanted to do but was not able to get the kind of material she wanted to draw and shade. Pt hung around for a while, but left early.           11845 - Group psychotherapy - 1 Session  Patient Active Problem List   Diagnosis    Homeless single person    Injury of right knee    Ulcer of esophagus with bleeding    Major depressive disorder, recurrent episode, moderate (H)    Polysubstance abuse (H)    Bipolar 1 disorder (H)

## 2025-01-22 PROCEDURE — 90832 PSYTX W PT 30 MINUTES: CPT

## 2025-01-22 PROCEDURE — 124N000002 HC R&B MH UMMC

## 2025-01-22 PROCEDURE — 250N000013 HC RX MED GY IP 250 OP 250 PS 637

## 2025-01-22 PROCEDURE — H2032 ACTIVITY THERAPY, PER 15 MIN: HCPCS

## 2025-01-22 PROCEDURE — 99232 SBSQ HOSP IP/OBS MODERATE 35: CPT | Mod: GC | Performed by: PSYCHIATRY & NEUROLOGY

## 2025-01-22 PROCEDURE — 250N000013 HC RX MED GY IP 250 OP 250 PS 637: Performed by: PSYCHIATRY & NEUROLOGY

## 2025-01-22 RX ORDER — FLUOXETINE 10 MG/1
10 CAPSULE ORAL DAILY
Status: DISCONTINUED | OUTPATIENT
Start: 2025-01-22 | End: 2025-01-23

## 2025-01-22 RX ADMIN — NICOTINE POLACRILEX 2 MG: 2 LOZENGE ORAL at 09:52

## 2025-01-22 RX ADMIN — LIDOCAINE 1 PATCH: 4 PATCH TOPICAL at 08:02

## 2025-01-22 RX ADMIN — FLUOXETINE HYDROCHLORIDE 10 MG: 10 CAPSULE ORAL at 11:10

## 2025-01-22 RX ADMIN — NICOTINE POLACRILEX 2 MG: 2 LOZENGE ORAL at 08:47

## 2025-01-22 RX ADMIN — HYDROXYZINE HYDROCHLORIDE 25 MG: 25 TABLET, FILM COATED ORAL at 09:52

## 2025-01-22 RX ADMIN — OLANZAPINE 2.5 MG: 2.5 TABLET, FILM COATED ORAL at 11:10

## 2025-01-22 RX ADMIN — DIPHENHYDRAMINE HYDROCHLORIDE 50 MG: 50 CAPSULE ORAL at 07:59

## 2025-01-22 RX ADMIN — OLANZAPINE 7.5 MG: 5 TABLET, FILM COATED ORAL at 07:59

## 2025-01-22 RX ADMIN — OLANZAPINE 7.5 MG: 5 TABLET, FILM COATED ORAL at 20:33

## 2025-01-22 ASSESSMENT — ACTIVITIES OF DAILY LIVING (ADL)
ADLS_ACUITY_SCORE: 41
ORAL_HYGIENE: INDEPENDENT
ADLS_ACUITY_SCORE: 64
DRESS: SCRUBS (BEHAVIORAL HEALTH);INDEPENDENT
ADLS_ACUITY_SCORE: 41
LAUNDRY: WITH SUPERVISION
ADLS_ACUITY_SCORE: 41
ADLS_ACUITY_SCORE: 41
ADLS_ACUITY_SCORE: 64
ADLS_ACUITY_SCORE: 41
ADLS_ACUITY_SCORE: 64
ADLS_ACUITY_SCORE: 41
HYGIENE/GROOMING: INDEPENDENT
ADLS_ACUITY_SCORE: 41
ADLS_ACUITY_SCORE: 64
ADLS_ACUITY_SCORE: 41
ADLS_ACUITY_SCORE: 64
ADLS_ACUITY_SCORE: 41
ADLS_ACUITY_SCORE: 64
DRESS: INDEPENDENT
HYGIENE/GROOMING: INDEPENDENT
ORAL_HYGIENE: INDEPENDENT
ADLS_ACUITY_SCORE: 41
ADLS_ACUITY_SCORE: 64

## 2025-01-22 NOTE — PLAN OF CARE
"  Problem: Pain Acute  Goal: Optimal Pain Control and Function  Outcome: Progressing  Intervention: Optimize Psychosocial Wellbeing  Recent Flowsheet Documentation  Taken 1/21/2025 1822 by Iona Malave RN  Supportive Measures:   active listening utilized   decision-making supported   goal-setting facilitated   guided imagery facilitated   journaling promoted   positive reinforcement provided   problem-solving facilitated   relaxation techniques promoted   self-care encouraged   self-reflection promoted   self-responsibility promoted   verbalization of feelings encouraged     Problem: Anxiety  Goal: Anxiety Reduction or Resolution  Outcome: Not Progressing  Intervention: Promote Anxiety Reduction  Recent Flowsheet Documentation  Taken 1/21/2025 1822 by Iona Malave RN  Supportive Measures:   active listening utilized   decision-making supported   goal-setting facilitated   guided imagery facilitated   journaling promoted   positive reinforcement provided   problem-solving facilitated   relaxation techniques promoted   self-care encouraged   self-reflection promoted   self-responsibility promoted   verbalization of feelings encouraged     Problem: Suicidal Behavior  Goal: Suicidal Behavior is Absent or Managed  Outcome: Progressing   Goal Outcome Evaluation:    Pt was isolated and withdrawn to her room majority of the shift. Affect blunted and flat. Stated \"my mood has been up and down all day\". Pt was irritable upon assessments. Endorsed anxiety 5, denies SI/HI, depression, A&V hallucinations. Reported right knee pain and requested PRN Naproxen.  Reported intervention was effective. Hydration and nutrition adequate. Pt was compliant with medications. No safety concerns. No behavioral concerns.  "

## 2025-01-22 NOTE — PROGRESS NOTES
----------------------------------------------------------------------------------------------------------  [facility]  Psychiatry Progress Note  Hospital Day #5     Interim History:     The patient's care was discussed with the treatment team and chart notes were reviewed.    Vitals: VSS  Sleep: 7 hours (01/22/25 0600)  Scheduled medications: Took all scheduled medications as prescribed  Psychiatric PRN medications: Diphenhydramine 50mg x 2, hydroxyzine 25mg x 1, olanzapine 10mg x 1, olanzapine 2.5mg x 1    Staff Report:   No acute events overnight. In summary, she has been withdrawn and isolated and anxious. Please see staff notes for details.      Subjective:     Patient Interview:  Jody Barrow was interviewed in workroom. She says she feels overwhelmed today. Says it is her partner of 10 years birthday but she has not been able to get in touch with him and does not know the status of their relationship. She endorses continuing to be anxious despite taking PRNs and confirms that she is hot, tingling, sweaty, and hands feel swollen and that this happens intermittently throughout the day.     In the past when she has used SSRIs she has felt that she got jittery. She thinks that she has taken only Lexapro and Paroxetine in the past. She is amenable to starting Prozac.     ROS:  Patient has no bothersome physical symptoms  Patient denies acute concerns     Objective:     Vitals:  /75   Pulse 96   Temp 98.7  F (37.1  C)   Resp 16   Wt 63.6 kg (140 lb 3.2 oz)   LMP  (LMP Unknown)   SpO2 100%     Allergies:  Allergies   Allergen Reactions    Codeine Anaphylaxis    Sunflower Oil Swelling and Rash    Cat Dander Hives     All animal hair       Current Medications:  Scheduled:  Current Facility-Administered Medications   Medication Dose Route Frequency Provider Last Rate Last Admin    acetaminophen (TYLENOL) tablet 650 mg  650 mg Oral Q4H PRN Coy Vasquez MD    650 mg at 01/21/25 1434    albuterol (PROVENTIL HFA/VENTOLIN HFA) inhaler  2 puff Inhalation Q6H PRN Aditi Garcia MD        alum & mag hydroxide-simethicone (MAALOX) suspension 30 mL  30 mL Oral Q4H PRN Coy Vasquez MD        diphenhydrAMINE (BENADRYL) capsule 50 mg  50 mg Oral Q6H PRN Aditi Garcia MD   50 mg at 01/22/25 0759    EPINEPHrine (ADRENALIN) kit 0.3 mg  0.3 mg Intramuscular Once PRN Aditi Garcia MD        hydrOXYzine HCl (ATARAX) tablet 25 mg  25 mg Oral Q4H PRN Karly Nicholas DO        Lidocaine (LIDOCARE) 4 % Patch 1 patch  1 patch Transdermal Q24H PRN Erik Shay MD   1 patch at 01/22/25 0802    naproxen (NAPROSYN) tablet 250 mg  250 mg Oral BID PRN Coy Vasquez MD   250 mg at 01/21/25 2037    nicotine (COMMIT) lozenge 2 mg  2 mg Buccal Q1H PRN Karly Nicholas DO   2 mg at 01/22/25 0847    OLANZapine (zyPREXA) tablet 10 mg  10 mg Oral TID PRN Coy Vasquez MD   10 mg at 01/21/25 1201    Or    OLANZapine (zyPREXA) injection 10 mg  10 mg Intramuscular TID PRN Coy Vasquez MD        OLANZapine (zyPREXA) tablet 2.5 mg  2.5 mg Oral BID PRN Coy Vasquez MD        OLANZapine (zyPREXA) tablet 7.5 mg  7.5 mg Oral BID Coy Vasquez MD   7.5 mg at 01/22/25 0759    traZODone (DESYREL) tablet 50 mg  50 mg Oral At Bedtime PRN Coy Vasquez MD           PRN:  Current Facility-Administered Medications   Medication Dose Route Frequency Provider Last Rate Last Admin    acetaminophen (TYLENOL) tablet 650 mg  650 mg Oral Q4H PRN Coy Vasquez MD   650 mg at 01/21/25 1434    albuterol (PROVENTIL HFA/VENTOLIN HFA) inhaler  2 puff Inhalation Q6H PRN Aditi Garcia MD        alum & mag hydroxide-simethicone (MAALOX) suspension 30 mL  30 mL Oral Q4H PRN Coy Vasquez MD        diphenhydrAMINE (BENADRYL) capsule 50 mg  50 mg Oral Q6H PRN Aditi Garcia MD   50 mg at 01/22/25 0759    EPINEPHrine (ADRENALIN) kit 0.3 mg  0.3 mg Intramuscular Once PRN Aditi Garcia MD         "hydrOXYzine HCl (ATARAX) tablet 25 mg  25 mg Oral Q4H PRN Karly Nicholas DO        Lidocaine (LIDOCARE) 4 % Patch 1 patch  1 patch Transdermal Q24H PRN Erik Shay MD   1 patch at 01/22/25 0802    naproxen (NAPROSYN) tablet 250 mg  250 mg Oral BID PRN Coy Vasquez MD   250 mg at 01/21/25 2037    nicotine (COMMIT) lozenge 2 mg  2 mg Buccal Q1H PRN Karly Nicholas DO   2 mg at 01/22/25 0847    OLANZapine (zyPREXA) tablet 10 mg  10 mg Oral TID PRN Coy Vasquez MD   10 mg at 01/21/25 1201    Or    OLANZapine (zyPREXA) injection 10 mg  10 mg Intramuscular TID PRN Coy Vasquez MD        OLANZapine (zyPREXA) tablet 2.5 mg  2.5 mg Oral BID PRN Coy Vasquez MD        OLANZapine (zyPREXA) tablet 7.5 mg  7.5 mg Oral BID Coy Vasquez MD   7.5 mg at 01/22/25 0759    traZODone (DESYREL) tablet 50 mg  50 mg Oral At Bedtime PRN Coy Vasquez MD           Labs and Imaging:  New results:   No results found for this or any previous visit (from the past 24 hours).    Data this admission:  - CBC remarkable for hemoglobin of 16  - CMP remarkable for ALT 61 and AST 49  - UDS positive for amphetamine     Mental Status Exam:     Oriented to:  Grossly Oriented  General:  Awake and Alert  Appearance:  appears stated age, Dressed in scrubs, and Grooming is inadequate due to not showering  Behavior/Attitude:  Calm and superficially cooperative   Eye Contact: Appropriate and Downcast  Psychomotor: Normal no catatonia present  Speech:  appropriate volume/tone  Language: Fluent in English with appropriate syntax and vocabulary.  Mood:  \"Overwhelmed\"  Affect:  appropriate, congruent with mood, flat, blunted, sad, and tearful  Thought Process:  coherent and ruminative  Thought Content:   suicidal ideation (passive); No apparent delusions  Associations:  intact  Insight:  fair   Judgment:  fair   Impulse control: fair  Attention Span:  grossly intact  Concentration:  grossly intact  Recent and Remote Memory:  not formally assessed  Fund of " Knowledge: average  Muscle Strength and Tone: normal  Gait and Station: Normal     Psychiatric Assessment     Jody Barrow is a 59 year old female previously diagnosed with MDD, GASTON alcohol and methamphetamine use who presented voluntarily with HI and MI in the context of housing instability and alcohol and methamphetamine use. Most recent psychiatric hospitalization was 11/2022. Significant symptoms on admission include depressed mood and guardedness. The MSE on admission was pertinent for flat sad mood. Biological contributions to mental health presentation include MDD, GASTON, and alcohol and methamphetamine use. Psychological contributions to mental health presentation include history of trauma, and distrust of others. Social factors contributing to mental health presentation include lack of close connections, turbulence in relationship with boyfriend and housing instability, lack of stable income. Protective factors voluntarily involving herself in inpatient treatment.      In summary, the patient's reported symptoms of depressed mood and social isolation in the context of her recent HI, SI, and substance use are consistent MDD and/vs substance induced psychosis. She will likely benefit from medication management and treatment and support resource coordination and facilitation this admission.     Given that she currently has SI and HI, patient warrants inpatient psychiatric hospitalization to maintain her safety.      Psychiatric Plan by Diagnosis      Today's changes:  - Started Fluoxetine 10 mg daily.  - Ordered labs for TSH, B12, folate     # MDD  # Alcohol use disorder  # Methamphetamine use  # Substance induced psychosis  1. Medications:  - Zyprexa 7.5 mg BID  - Fluoxetine 10 mg daily     2. Pertinent Labs/Monitoring:   - None     3. Additional Plans:  - Patient will be treated in therapeutic milieu with appropriate individual and group therapies as described  -  treatment at [facility] planned for  discharge     Psychiatric Hospital Course:      Jody Barrow was admitted to [facility] as a voluntary patient.   Medications:  New medications started at the time of admission include:   Scheduled  Zyprexa 7.5 mg BID  PRN  Hydroxyzine 25 mg  Trazodone 50 mg   Zyprexa 10 mg  Naproxen 250 mg  Tylenol 650 mg  Maalox  01/20 diphenhydramine, epi-pen, and albuterol made available PRN for allergies  01/21 added 2.5 mg olanzapine PRN for anxiety  01/22 Start fluoxetine 10 mg daily for MDD      The risks, benefits, alternatives, and side effects were discussed and understood by the patient and other caregivers.     Medical Assessment and Plan     Medical diagnoses to be addressed this admission:    # Labial BP  # Pruritus  # Diaphoresis  # Flushing  Likely 2/2 to anxiety but will look into TSH, and vitamin deficiencies B12 and folate. CBC, CMP WNL.  - Order TSH, B12, Folate    # Knee pain  - Naproxen 250 mg PRN     # Asthma   - Albuterol PRN    # Food allergy  - Diphenhydramine PRN  - Epi-pen PRN     Medical course: Patient was physically examined by the ED prior to being transferred to the unit and was found to be medically stable and appropriate for admission.      Consults:  none     Checklist     Legal Status: Voluntary    Safety Assessment:   Behavioral Orders   Procedures    Code 1 - Restrict to Unit    Code 1 - Restrict to Unit    Routine Programming     As clinically indicated    Routine Programming     As clinically indicated    Status 15     Every 15 minutes.    Status 15     Every 15 minutes.       Risk Assessment:  Risk for harm is elevated.  Risk factors: SI, HI, substance use, family history, and past behaviors  Protective factors: engaged in treatment     SIO: No    Disposition: CD residential treatment ([facility]). Pending clinical stabilization and medication optimization.     Attestations     Ariel Chang, MS3  [facility]    Resident/Fellow Attestation   I, Karly Nicholas DO, was present  with the medical/VARINDER student who participated in the service and in the documentation of the note.  I have verified the history and personally performed the physical exam and medical decision making.  I agree with the assessment and plan of care as documented in the note.      Karly Nicholas DO  PGY1  Date of Service (when I saw the patient): 01/22/25  Attestation:  This patient has been seen and evaluated by me, Coy Vasquez MD.  I have discussed this patient with the house staff team including the resident and/or medical student and I agree with the findings and plan in this note.    I have reviewed today's vital signs, medications, labs and imaging. Coy Vasquez MD , PhD.

## 2025-01-22 NOTE — PLAN OF CARE
Problem: Sleep Disturbance  Goal: Adequate Sleep/Rest  Outcome: Progressing   Goal Outcome Evaluation:      Pt has been in bed this whole shift. No c/o pain or discomfort, and no PRNs were administered. Pt slept for about 7 hours. No behavioral or safety concern noted this shift.  Blood pressure 136/68, pulse 88, temperature 98.2  F (36.8  C), resp. rate 16, weight 63.6 kg (140 lb 3.2 oz), SpO2 100%. .

## 2025-01-22 NOTE — PLAN OF CARE
Team Note Due:  Tuesday    Assessment/Intervention/Current Symtoms and Care Coordination:  Chart review and met with team, discussed pt progress, symptomology, and response to treatment.  Discussed the discharge plan and any potential impediments to discharge.    Admissions at [facility] requested progress notes from the last 3 days. Sent via secure email.     Discharge Plan or Goal:  Residential CD treatment     Barriers to Discharge:  Patient requires further psychiatric stabilization due to current symptomology, medication management with changes subject to provider, coordination with outside supports, and aftercare planning.      Referral Status:  [facility] ([facility])     Legal Status:  Voluntary    Contacts:  N/A     Upcoming Meetings and Dates/Important Information and next steps:  Follow up on [facility] referral  Discuss [facility] with pt. Make referral to [facility] if pt agreeable.

## 2025-01-22 NOTE — PLAN OF CARE
Problem: Adult Behavioral Health Plan of Care  Goal: Adheres to Safety Considerations for Self and Others  Outcome: Progressing  Goal: Absence of New-Onset Illness or Injury  Outcome: Progressing     Problem: Anxiety  Goal: Anxiety Reduction or Resolution  Outcome: Progressing     Problem: Sleep Disturbance  Goal: Adequate Sleep/Rest  Outcome: Progressing     Problem: Suicidal Behavior  Goal: Suicidal Behavior is Absent or Managed  Outcome: Progressing   Goal Outcome Evaluation:    Pt comes out for meals and medications. She is pleasant and cooperative. She is eating and drinking adequately. Pt interacts with peers. Interactions were appropriate. Upon check in the the pt  mid morning, she said she was irritable. Pt did not want to elaborate. She was tearful when she remembered that its her boyfriend's birthday today. She complained of knee pain at 6/10, PRN Lidocaine patch applied. Pt limps a little when she walks. This RN tried to finish admission questions with her and she complied. But in the middle of it, she asked to finish it later. She said that she will stay in her room for now.   Pt was feeling itchy and requested for Benadryl.   Pt endorsed anxiety and depression, no SI and hallucinations. PRN hydroxyzine and Zyprexa administered.     PRNs: Benadryl, Hydroxyzine, Zyprexa

## 2025-01-22 NOTE — PLAN OF CARE
Rehab Group    Start time: 11:15  End time: 12:05  Patient time total: 7 minutes    attended partial group    #5 attended   Group Type: OT Clinic   Group Topic Covered: balanced lifestyle, cognitive activities, coping skills, emotional regulation, healthy leisure time, mindfulness, relaxation , and social skills     Group Session Detail:  Pt actively participated in occupational therapy clinic to facilitate coping skill exploration, creative expression within personally meaningful activities, and clinical observation of social, cognitive, and kinesthetic performance skills.      Patient Response/Contribution:  cooperative with task     Patient Detail:  Pt response: supervision to initiate, sequence, and adjust to workspace demands as needed. Jessie offered to gather needed materials. Demonstrated good focus, planning, and problem solving for selected canvas coloring task. Patient briefly worked on task before leaving group with no obvious reason for early exit. No return to the group. No charge due to the short duration of time spent in group. Affect: blunted. Mood: calm, cooperative.       No Charge      Patient Active Problem List   Diagnosis    Homeless single person    Injury of right knee    Ulcer of esophagus with bleeding    Major depressive disorder, recurrent episode, moderate (H)    Polysubstance abuse (H)    Bipolar 1 disorder (H)

## 2025-01-22 NOTE — PLAN OF CARE
BEH IP Unit Acuity Rating Score (UARS)  Patient is given one point for every criteria they meet.    CRITERIA SCORING   On a 72 hour hold, court hold, committed, stay of commitment, or revocation. 0    Patient LOS on BEH unit exceeds 20 days. 0  LOS: 5   Patient under guardianship, 55+, otherwise medically complex, or under age 11. 1   Suicide ideation without relief of precipitating factors. 1   Current plan for suicide. 0   Current plan for homicide. 0   Imminent risk or actual attempt to seriously harm another without relief of factors precipitating the attempt. 0   Severe dysfunction in daily living (ex: complete neglect for self care, extreme disruption in vegetative function, extreme deterioration in social interactions). 1   Recent (last 7 days) or current physical aggression in the ED or on unit. 0   Restraints or seclusion episode in past 72 hours. 0   Recent (last 7 days) or current verbal aggression, agitation, yelling, etc., while in the ED or unit. 0   Active psychosis. 0   Need for constant or near constant redirection (from leaving, from others, etc).  0   Intrusive or disruptive behaviors. 0   Patient requires 3 or more hours of individualized nursing care per 8-hour shift (i.e. for ADLs, meds, therapeutic interventions). 0   TOTAL 3

## 2025-01-22 NOTE — PROGRESS NOTES
Rehab Group     Start time: 1030  End time: 1115  Patient time total: 30 minutes     attended partial group     #6 attended   Group Type: music   Group Topic Covered: balanced lifestyle, coping skills, healthy leisure time, and social skills      Group Session Detail: Cooperatively engaged in Music Relaxation group to decrease anxiety and promote self-expression and group cohesion.       Patient Response/Contribution:  attentive, actively engaged, and worked intermittently      Patient Detail: Calm affect, appropriately engaged in session, responding well to the music.  Presented as regulated during session.      Pt engaged socially with peer, relating to peer on a common topic.  Was in and out of group d/t pt meeting with doctors.        Activity Therapy Per 15 min ()    Patient Active Problem List   Diagnosis    Homeless single person    Injury of right knee    Ulcer of esophagus with bleeding    Major depressive disorder, recurrent episode, moderate (H)    Polysubstance abuse (H)    Bipolar 1 disorder (H)

## 2025-01-22 NOTE — PLAN OF CARE
"Individual Therapy Note      Date of Service: January 22, 2025    Patient: Leta goes by \"Leta,\" uses she/her pronouns    Individuals Present: Leta & [place] [facility]    Session start: 1140  Session end: 1200  Session duration in minutes: 20      Modality Used: Person Centered, Brief Therapy, and Solution Focused    Goals: Verbal processing, safety planning    Patient Description of current symptoms: high anxiety     Mental Status Exam:   Attitude: cooperative  Eye Contact: fair  Mood: anxious  Affect: intensity is exaggerated  Speech: clear, coherent  Psychomotor Behavior: no evidence of tardive dyskinesia, dystonia, or tics  Thought Process:  logical, linear, and goal oriented  Associations: no loose associations  Thought Content: no evidence of suicidal ideation or homicidal ideation, no evidence of psychotic thought, no auditory hallucinations present, and no visual hallucinations present  Insight: fair  Judgement: fair  Attention Span and Concentration: fair    Pt progress: Met pt in her room. Pt shared she had a lot of anxiety to day b/c it's her man's birthday and she can't get a hold of him.Writer and pt discussed some ways she could cope with her anxiety. Pt had questions about placement, which writer then got from  and delivered. Pt and writer reviewed safety plan.     Treatment Objective(s) Addressed:   The focus of this session was on orienting the patient to therapy, identifying and practicing coping strategies, and safety planning     Progress Towards Goals and Assessment of Patient:   Patient is making progress towards treatment goals as evidenced by using coping skills.       Therapeutic Intervention(s):   Provided active listening, unconditional positive regard, and validation.   Engaged in safety planning identifying coping skills, warning signs, health support and resources, Identified and practiced coping skills, and Engaged in guided discovery, explored patient's " perspectives and helped expand them through socratic dialogue      Plan/next step: Engaged in unit programming  Check in with therapist as needed      13338 - Psychotherapy (with patient) - 30 (16-37*) min    Patient Active Problem List   Diagnosis    Homeless single person    Injury of right knee    Ulcer of esophagus with bleeding    Major depressive disorder, recurrent episode, moderate (H)    Polysubstance abuse (H)    Bipolar 1 disorder (H)

## 2025-01-23 LAB
ATRIAL RATE - MUSE: 87 BPM
DIASTOLIC BLOOD PRESSURE - MUSE: NORMAL MMHG
FOLATE SERPL-MCNC: 7.4 NG/ML (ref 4.6–34.8)
HOLD SPECIMEN: NORMAL
INTERPRETATION ECG - MUSE: NORMAL
P AXIS - MUSE: 57 DEGREES
PR INTERVAL - MUSE: 146 MS
QRS DURATION - MUSE: 76 MS
QT - MUSE: 374 MS
QTC - MUSE: 450 MS
R AXIS - MUSE: -13 DEGREES
SYSTOLIC BLOOD PRESSURE - MUSE: NORMAL MMHG
T AXIS - MUSE: 42 DEGREES
TSH SERPL DL<=0.005 MIU/L-ACNC: 0.99 UIU/ML (ref 0.3–4.2)
VENTRICULAR RATE- MUSE: 87 BPM
VIT B12 SERPL-MCNC: 420 PG/ML (ref 232–1245)

## 2025-01-23 PROCEDURE — 250N000013 HC RX MED GY IP 250 OP 250 PS 637

## 2025-01-23 PROCEDURE — 99232 SBSQ HOSP IP/OBS MODERATE 35: CPT | Mod: GC | Performed by: PSYCHIATRY & NEUROLOGY

## 2025-01-23 PROCEDURE — 84443 ASSAY THYROID STIM HORMONE: CPT

## 2025-01-23 PROCEDURE — 93010 ELECTROCARDIOGRAM REPORT: CPT | Performed by: INTERNAL MEDICINE

## 2025-01-23 PROCEDURE — 82607 VITAMIN B-12: CPT

## 2025-01-23 PROCEDURE — 93005 ELECTROCARDIOGRAM TRACING: CPT

## 2025-01-23 PROCEDURE — 250N000013 HC RX MED GY IP 250 OP 250 PS 637: Performed by: PSYCHIATRY & NEUROLOGY

## 2025-01-23 PROCEDURE — 82746 ASSAY OF FOLIC ACID SERUM: CPT

## 2025-01-23 PROCEDURE — 36415 COLL VENOUS BLD VENIPUNCTURE: CPT

## 2025-01-23 PROCEDURE — 124N000002 HC R&B MH UMMC

## 2025-01-23 RX ORDER — LORATADINE 10 MG/1
10 TABLET ORAL DAILY
Status: DISCONTINUED | OUTPATIENT
Start: 2025-01-23 | End: 2025-01-24 | Stop reason: HOSPADM

## 2025-01-23 RX ORDER — FLUOXETINE 10 MG/1
10 CAPSULE ORAL ONCE
Status: COMPLETED | OUTPATIENT
Start: 2025-01-23 | End: 2025-01-23

## 2025-01-23 RX ORDER — DIAPER,BRIEF,INFANT-TODD,DISP
EACH MISCELLANEOUS 3 TIMES DAILY PRN
Status: DISCONTINUED | OUTPATIENT
Start: 2025-01-23 | End: 2025-01-24 | Stop reason: HOSPADM

## 2025-01-23 RX ADMIN — LIDOCAINE 1 PATCH: 4 PATCH TOPICAL at 08:23

## 2025-01-23 RX ADMIN — FLUOXETINE HYDROCHLORIDE 10 MG: 10 CAPSULE ORAL at 08:23

## 2025-01-23 RX ADMIN — FLUOXETINE HYDROCHLORIDE 10 MG: 10 CAPSULE ORAL at 14:38

## 2025-01-23 RX ADMIN — OLANZAPINE 7.5 MG: 5 TABLET, FILM COATED ORAL at 08:23

## 2025-01-23 RX ADMIN — OLANZAPINE 7.5 MG: 5 TABLET, FILM COATED ORAL at 19:58

## 2025-01-23 RX ADMIN — OLANZAPINE 10 MG: 10 TABLET, FILM COATED ORAL at 13:57

## 2025-01-23 RX ADMIN — HYDROXYZINE HYDROCHLORIDE 25 MG: 25 TABLET, FILM COATED ORAL at 11:08

## 2025-01-23 RX ADMIN — LORATADINE 10 MG: 10 TABLET ORAL at 11:01

## 2025-01-23 RX ADMIN — ALBUTEROL SULFATE 2 PUFF: 90 AEROSOL, METERED RESPIRATORY (INHALATION) at 13:18

## 2025-01-23 RX ADMIN — DIPHENHYDRAMINE HYDROCHLORIDE 50 MG: 50 CAPSULE ORAL at 08:27

## 2025-01-23 RX ADMIN — NAPROXEN 250 MG: 250 TABLET ORAL at 13:17

## 2025-01-23 ASSESSMENT — ACTIVITIES OF DAILY LIVING (ADL)
ADLS_ACUITY_SCORE: 41
DRESS: SCRUBS (BEHAVIORAL HEALTH);INDEPENDENT
ADLS_ACUITY_SCORE: 41
HYGIENE/GROOMING: INDEPENDENT
ADLS_ACUITY_SCORE: 41
ADLS_ACUITY_SCORE: 41
LAUNDRY: WITH SUPERVISION
ADLS_ACUITY_SCORE: 41

## 2025-01-23 NOTE — PLAN OF CARE
Team Note Due:  Tuesday    Assessment/Intervention/Current Symtoms and Care Coordination:  Chart review and met with team, discussed pt progress, symptomology, and response to treatment.  Discussed the discharge plan and any potential impediments to discharge.    [facility] admissions inquiring about HI as this was noted at admission. Provided clarification pt is not endorsing HI or thoughts of hurting others and these thoughts appeared to be in the context of meth use. Admissions approved referral and indicated intake will reach out to schedule admission.     Discharge Plan or Goal:  Residential CD treatment     Barriers to Discharge:  Patient requires further psychiatric stabilization due to current symptomology, medication management with changes subject to provider, coordination with outside supports, and aftercare planning.      Referral Status:  [facility] ([facility])     Legal Status:  Voluntary    Contacts:  N/A     Upcoming Meetings and Dates/Important Information and next steps:  Follow up on [facility] referral  Discuss [facility] with pt. Make referral to [facility] if pt agreeable.

## 2025-01-23 NOTE — PLAN OF CARE
Problem: Sleep Disturbance  Goal: Adequate Sleep/Rest  Outcome: Progressing   Goal Outcome Evaluation:     Pt slept for about 6.5 hours this shift. No reported pain issues, No safety or behavioral concern noted or reported. Continue to monitor and offer support.    Blood pressure 112/75, pulse 96, temperature 98.7  F (37.1  C), resp. rate 16, weight 63.6 kg (140 lb 3.2 oz), SpO2 100%.

## 2025-01-23 NOTE — PROGRESS NOTES
"  ----------------------------------------------------------------------------------------------------------  [facility]  Psychiatry Progress Note  Hospital Day #6     Interim History:     The patient's care was discussed with the treatment team and chart notes were reviewed.    Vitals: VSS  Sleep: 6.5 hours (01/23/25 0600)  Scheduled medications: Took all scheduled medications as prescribed  Psychiatric PRN medications: Diphenhydramine 50mg x 1, olanzapine 2.5mg x 1    Staff Report:   No acute events overnight. In summary, withdrawn and keeping to herself. Sleeping some during day. Flat, sad affect, tearful. Please see staff notes for details.      Subjective:     Patient Interview:  Jody Barrow was interviewed in her room. Says that she's been itchy (arms and on her back) since this morning. Amenable to starting hydrocortisone cream over the affected area and ocean nasal spray for nasal passage dryness given episode of epistaxis in the shower this morning. Also reports of pain over the L-sided ribs that doesn't improve with anything. Reports of tingling sensation that start in her hands and travel up the arms.     She reports that she enjoys doing art but does not like being social and interacting with other people. Frustrated that previous artwork on blackboard erased by others. Wanting to draw in her room, encouraged to try the blackboard again and spend time in the milieu.     She does endorse passive homicidal ideation without intent that may reflect an intent to remain hospitalized. When this topic was explored by asking if she was discharged today, and she saw the subjects of her HI, would she actually hurt them she said, \"no of course not,\" just that she liked to think about it.     ROS:  Patient has pain in hips and chest pain  Patient denies acute concerns     Objective:     Vitals:  BP (!) 163/92 (BP Location: Right arm, Patient Position: Sitting, Cuff " Size: Adult Regular)   Pulse 99   Temp 98.3  F (36.8  C) (Oral)   Resp 18   Wt 64 kg (141 lb 3.2 oz)   LMP  (LMP Unknown)   SpO2 96%     Allergies:  Allergies   Allergen Reactions    Codeine Anaphylaxis    Sunflower Oil Swelling and Rash    Cat Dander Hives     All animal hair       Current Medications:  Scheduled:  Current Facility-Administered Medications   Medication Dose Route Frequency Provider Last Rate Last Admin    acetaminophen (TYLENOL) tablet 650 mg  650 mg Oral Q4H PRN Coy Vasquez MD   650 mg at 01/21/25 1434    albuterol (PROVENTIL HFA/VENTOLIN HFA) inhaler  2 puff Inhalation Q6H PRN Aditi Garcia MD        alum & mag hydroxide-simethicone (MAALOX) suspension 30 mL  30 mL Oral Q4H PRN Coy Vasquez MD        diphenhydrAMINE (BENADRYL) capsule 50 mg  50 mg Oral Q6H PRN Aditi Garcia MD   50 mg at 01/23/25 0827    EPINEPHrine (ADRENALIN) kit 0.3 mg  0.3 mg Intramuscular Once PRN Aditi Garcia MD        FLUoxetine (PROzac) capsule 10 mg  10 mg Oral Daily Karly Nicholas, DO   10 mg at 01/23/25 0823    hydrOXYzine HCl (ATARAX) tablet 25 mg  25 mg Oral Q4H PRN Karly Nicholas DO   25 mg at 01/22/25 0952    Lidocaine (LIDOCARE) 4 % Patch 1 patch  1 patch Transdermal Q24H PRN Erik Shay MD   1 patch at 01/23/25 0823    naproxen (NAPROSYN) tablet 250 mg  250 mg Oral BID PRN Coy Vasquez MD   250 mg at 01/21/25 2037    nicotine (COMMIT) lozenge 2 mg  2 mg Buccal Q1H PRN Karly Nicholas DO   2 mg at 01/22/25 0952    OLANZapine (zyPREXA) tablet 10 mg  10 mg Oral TID PRN Coy Vasquez MD   10 mg at 01/21/25 1201    Or    OLANZapine (zyPREXA) injection 10 mg  10 mg Intramuscular TID PRN Coy Vasquez MD        OLANZapine (zyPREXA) tablet 2.5 mg  2.5 mg Oral BID PRN Coy Vasquez MD   2.5 mg at 01/22/25 1110    OLANZapine (zyPREXA) tablet 7.5 mg  7.5 mg Oral BID Coy Vasquez MD   7.5 mg at 01/23/25 0823    sodium chloride (OCEAN) 0.65 % nasal spray 1 spray  1 spray Both Nostrils Q1H  PRN Karly Nicholas DO        traZODone (DESYREL) tablet 50 mg  50 mg Oral At Bedtime PRN Coy Vasquez MD           PRN:  Current Facility-Administered Medications   Medication Dose Route Frequency Provider Last Rate Last Admin    acetaminophen (TYLENOL) tablet 650 mg  650 mg Oral Q4H PRN Coy Vasquez MD   650 mg at 01/21/25 1434    albuterol (PROVENTIL HFA/VENTOLIN HFA) inhaler  2 puff Inhalation Q6H PRN Aditi Garcia MD        alum & mag hydroxide-simethicone (MAALOX) suspension 30 mL  30 mL Oral Q4H PRN Coy Vasquez MD        diphenhydrAMINE (BENADRYL) capsule 50 mg  50 mg Oral Q6H PRN Aditi Garcia MD   50 mg at 01/23/25 0827    EPINEPHrine (ADRENALIN) kit 0.3 mg  0.3 mg Intramuscular Once PRN Aditi Garcia MD        FLUoxetine (PROzac) capsule 10 mg  10 mg Oral Daily Karly Nicholas DO   10 mg at 01/23/25 0823    hydrOXYzine HCl (ATARAX) tablet 25 mg  25 mg Oral Q4H PRN Karly Nicholas DO   25 mg at 01/22/25 0952    Lidocaine (LIDOCARE) 4 % Patch 1 patch  1 patch Transdermal Q24H PRN Erik Shay MD   1 patch at 01/23/25 0823    naproxen (NAPROSYN) tablet 250 mg  250 mg Oral BID PRN Coy Vasquez MD   250 mg at 01/21/25 2037    nicotine (COMMIT) lozenge 2 mg  2 mg Buccal Q1H PRN Karly Nicholas DO   2 mg at 01/22/25 0952    OLANZapine (zyPREXA) tablet 10 mg  10 mg Oral TID PRN Coy Vasquez MD   10 mg at 01/21/25 1201    Or    OLANZapine (zyPREXA) injection 10 mg  10 mg Intramuscular TID PRN Coy Vasquez MD        OLANZapine (zyPREXA) tablet 2.5 mg  2.5 mg Oral BID PRN Coy Vasquez MD   2.5 mg at 01/22/25 1110    OLANZapine (zyPREXA) tablet 7.5 mg  7.5 mg Oral BID Coy Vasquez MD   7.5 mg at 01/23/25 0823    sodium chloride (OCEAN) 0.65 % nasal spray 1 spray  1 spray Both Nostrils Q1H PRN Karly Nicholas DO        traZODone (DESYREL) tablet 50 mg  50 mg Oral At Bedtime PRN Coy Vasquez MD           Labs and Imaging:  New results:   Recent Results (from the past 24 hours)   TSH with free  "T4 reflex    Collection Time: 01/23/25  7:56 AM   Result Value Ref Range    TSH 0.99 0.30 - 4.20 uIU/mL   Extra Purple Top Tube    Collection Time: 01/23/25  7:56 AM   Result Value Ref Range    Hold Specimen JIC        Data this admission:  - CBC remarkable for hemoglobin of 16  - CMP remarkable for ALT 61 and AST 49  - UDS positive for amphetamine  - TSH unremarkable  - Folate unremarkable     Mental Status Exam:     Oriented to:  Grossly Oriented  General:  Awake and Alert  Appearance:  appears stated age, Grooming is adequate, and Dressed in scrubs  Behavior/Attitude:  Calm and superficially cooperative   Eye Contact: Appropriate and Downcast  Psychomotor: Normal no catatonia present  Speech:  appropriate volume/tone  Language: Fluent in English with appropriate syntax and vocabulary.  Mood:  \"Itchy\"  Affect:  appropriate, congruent with mood, and flat  Thought Process:  coherent and ruminative  Thought Content:    Passive homicidal ideation without intent that may reflect an intent to remain hospitalized and suicidal ideation (passive); No apparent delusions  Associations:  intact  Insight:  fair   Judgment:  fair   Impulse control: fair  Attention Span:  grossly intact  Concentration:  grossly intact  Recent and Remote Memory:  not formally assessed  Fund of Knowledge: average  Muscle Strength and Tone: normal  Gait and Station: Normal     Psychiatric Assessment     Jody Barrow is a 59 year old female previously diagnosed with MDD, GASTON alcohol and methamphetamine use who presented voluntarily with HI and MI in the context of housing instability and alcohol and methamphetamine use. Most recent psychiatric hospitalization was 11/2022. Significant symptoms on admission include depressed mood and guardedness. The MSE on admission was pertinent for flat sad mood. Biological contributions to mental health presentation include MDD, GASTON, and alcohol and methamphetamine use. Psychological contributions to mental " health presentation include history of trauma, and distrust of others. Social factors contributing to mental health presentation include lack of close connections, turbulence in relationship with boyfriend and housing instability, lack of stable income. Protective factors voluntarily involving herself in inpatient treatment.      In summary, the patient's reported symptoms of depressed mood and social isolation in the context of her recent HI, SI, and substance use are consistent MDD and/vs substance induced psychosis. She will likely benefit from medication management and treatment and support resource coordination and facilitation this admission.     Given that she has passive SI and HI, patient warrants inpatient psychiatric hospitalization to maintain her safety.      Psychiatric Plan by Diagnosis      Today's changes:  - Increased Fluoxetine to 20 mg daily.  - Started loratadine 10 mg daily for itchiness. .  - Hydrocortisone cream PRN for itchiness   - Ocean nasal spray PRN for nasal cavity dryness   - Repeat EKG given left-sided rib pain to rule out cardiac-cause      # MDD  # Alcohol use disorder  # Methamphetamine use  # Substance induced psychosis  1. Medications:  - Zyprexa 7.5 mg BID  - Fluoxetine 20 mg daily     2. Pertinent Labs/Monitoring:   - None     3. Additional Plans:  - Patient will be treated in therapeutic milieu with appropriate individual and group therapies as described  - CD treatment at [facility] planned for discharge     Psychiatric Hospital Course:      Jody Barrow was admitted to [facility] as a voluntary patient.   Medications:  New medications started at the time of admission include:   Scheduled  Zyprexa 7.5 mg BID  PRN  Hydroxyzine 25 mg  Trazodone 50 mg   Zyprexa 10 mg  Naproxen 250 mg  Tylenol 650 mg  Maalox  01/20 diphenhydramine, epi-pen, and albuterol made available PRN for allergies  01/21 added 2.5 mg olanzapine PRN for anxiety  01/22 Start fluoxetine 10 mg daily for MDD    01/23 increase fluoxetine to 20 mg daily. Start loratadine 10 mg daily. Hydrocortisone cream PRN. Saline nose spray PRN.      The risks, benefits, alternatives, and side effects were discussed and understood by the patient and other caregivers.     Medical Assessment and Plan     Medical diagnoses to be addressed this admission:    # Labial BP  # Diaphoresis  # Flushing  # Tingling in upper extremities  Likely 2/2 to anxiety. CBC, CMP, B12, folate WNL.    # Pruritus  Potentially psychosomatic or 2/2 to anxiety, will treat with 2nd gen antihistamine for potential seasonal allergies and reduce utilization of benadryl and make hydrocortisone cream available PRN.  - Hydrocortisone 0.5 % cream 3 x daily PRN  - Start loratadine 10 mg daily    # Non-cardiac chest pain  Left lateral chest, feels like muscle spasm, no rash, no pain elicited on palpation. Pain does not vary with activity. Described as feeling like a bruise.   - EKG pending    # Epistaxis  Mild nose bleed in shower. Resolved spontaneously.   - Saline nasal spray PRN    # Knee pain  # Hip Pain  Described grinding sensation.   - Naproxen 250 mg PRN     # Asthma   - Albuterol PRN    # Food allergy  - Diphenhydramine PRN  - Epi-pen PRN     Medical course: Patient was physically examined by the ED prior to being transferred to the unit and was found to be medically stable and appropriate for admission.      Consults:  none     Checklist     Legal Status: Voluntary    Safety Assessment:   Behavioral Orders   Procedures    Code 1 - Restrict to Unit    Code 1 - Restrict to Unit    Routine Programming     As clinically indicated    Routine Programming     As clinically indicated    Status 15     Every 15 minutes.    Status 15     Every 15 minutes.       Risk Assessment:  Risk for harm is elevated.  Risk factors: SI, HI, substance use, family history, and past behaviors  Protective factors: engaged in treatment     SIO: No    Disposition: CD residential treatment  ([facility]). Pending clinical stabilization and medication optimization.     Attestations     Ariel Chang, MS3  [facility]    Resident/Fellow Attestation   I, Karly Nicholas DO, was present with the medical/VARINDER student who participated in the service and in the documentation of the note.  I have verified the history and personally performed the physical exam and medical decision making.  I agree with the assessment and plan of care as documented in the note.      Karly Nicholas DO  PGY1  Date of Service (when I saw the patient): 01/23/25    Attestation:  This patient has been seen and evaluated by me, Coy Vasquez MD.  I have discussed this patient with the house staff team including the resident and/or medical student and I agree with the findings and plan in this note.    I have reviewed today's vital signs, medications, labs and imaging. Coy Vasquez MD , PhD.

## 2025-01-23 NOTE — PLAN OF CARE
BEH IP Unit Acuity Rating Score (UARS)  Patient is given one point for every criteria they meet.    CRITERIA SCORING   On a 72 hour hold, court hold, committed, stay of commitment, or revocation. 0    Patient LOS on BEH unit exceeds 20 days. 0  LOS: 6   Patient under guardianship, 55+, otherwise medically complex, or under age 11. 1   Suicide ideation without relief of precipitating factors. 1   Current plan for suicide. 0   Current plan for homicide. 0   Imminent risk or actual attempt to seriously harm another without relief of factors precipitating the attempt. 0   Severe dysfunction in daily living (ex: complete neglect for self care, extreme disruption in vegetative function, extreme deterioration in social interactions). 1   Recent (last 7 days) or current physical aggression in the ED or on unit. 0   Restraints or seclusion episode in past 72 hours. 0   Recent (last 7 days) or current verbal aggression, agitation, yelling, etc., while in the ED or unit. 0   Active psychosis. 0   Need for constant or near constant redirection (from leaving, from others, etc).  0   Intrusive or disruptive behaviors. 0   Patient requires 3 or more hours of individualized nursing care per 8-hour shift (i.e. for ADLs, meds, therapeutic interventions). 0   TOTAL 3

## 2025-01-23 NOTE — PLAN OF CARE
"Shift Summary ([id number])  Mental Health Status   Pt is alert and oriented x 4. Able to communicate needs. Mood is labile and guarded with blunted affect. Demanding and frequently seeks out staff. No signs of distress noted. No delusion or hallucination noted and appears to be in touch with reality. Insight and judgement are fair.   Pt denies SI, SIB, AH, VH, racing or intrusive thoughts. Endorses both anxiety and depression. Took prn Hydroxyzine first and later prn Zyprexa per request.   Pt was visible in [facility] isolative. Participated in group therapy.  Pt took scheduled medication ok with no problem. Tolerated medications well. No side effect reported or observed.  No acute events or safety concern this shift.   Prn given: Hydroxyzine, Zyprexa. Reported effective on follow up.   Physical Health Status   Moves around independently with no difficulties.   Hygiene is appropriate. Took a shower this morning. Reported \" massive \" nose bleed during shower. Not witnessed by staffs. Pt was advised to show bleeding to staff next time. Provider updated. See order for prn nasal spray.   Appetite and fluid intake are adequate. Ate both breakfast and lunch.   Reported no problem with bowel and bladder.   Slept 6.5 hours last night per staff's report.   Continues to have pain on right knee and left rib cage. Prn Lidoderm patch applied and Naproxen was administered. See new order for EKG for rib pain.   Prn Benadryl administered per pt's request for itching.    Prn Albuterol inhaler given per request. Writer did not note any cough or overt signs or symptoms of SOB.   Today's Lab orders: TSH, Folate, Vitamin B12. Pt was cooperative with blood draw.   Sitting /89 & pulse 108  Standing BP & pulse- Not measured.   Pt reported slight dizziness around 1100. /92 ,pulse 99. Pt was helped to bed. BP was rechecked in hour for 116/65, pulse 96. Provider updated.   Prn given: Benadryl, Lidocaine path, Naproxen, Albuterol " inhaler . Reported effective on follow up.   Continue to monitor pt's status Q 15 minutes and stabilize the patient's symptoms with the use of medications and therapeutic programming.

## 2025-01-23 NOTE — PLAN OF CARE
Problem: Anxiety  Goal: Anxiety Reduction or Resolution  Outcome: Progressing     Problem: Suicidal Behavior  Goal: Suicidal Behavior is Absent or Managed  Outcome: Progressing     Problem: Depression  Goal: Decreased Depression Symptoms  Outcome: Progressing   Goal Outcome Evaluation:    Pt spend majority of the shift in her room. Pt only came out for meals. Affect blunted and flat. Mood angry/hostile and depressed. Pt was irritable upon assessments and dismissive with all mental health and psych symptoms. Nutrition and hydration adequate. Pt was compliant with  medications. No safety concerns. No behavioral issues this shift.

## 2025-01-24 VITALS
SYSTOLIC BLOOD PRESSURE: 116 MMHG | TEMPERATURE: 98.2 F | RESPIRATION RATE: 18 BRPM | WEIGHT: 141.2 LBS | DIASTOLIC BLOOD PRESSURE: 69 MMHG | OXYGEN SATURATION: 100 % | HEART RATE: 100 BPM

## 2025-01-24 PROCEDURE — H2032 ACTIVITY THERAPY, PER 15 MIN: HCPCS

## 2025-01-24 PROCEDURE — 250N000013 HC RX MED GY IP 250 OP 250 PS 637: Performed by: PSYCHIATRY & NEUROLOGY

## 2025-01-24 PROCEDURE — 250N000013 HC RX MED GY IP 250 OP 250 PS 637

## 2025-01-24 PROCEDURE — 99238 HOSP IP/OBS DSCHRG MGMT 30/<: CPT | Mod: GC | Performed by: PSYCHIATRY & NEUROLOGY

## 2025-01-24 RX ORDER — LORATADINE 10 MG/1
10 TABLET ORAL DAILY
Qty: 30 TABLET | Refills: 0 | Status: SHIPPED | OUTPATIENT
Start: 2025-01-25 | End: 2025-02-24

## 2025-01-24 RX ORDER — OLANZAPINE 7.5 MG/1
7.5 TABLET, FILM COATED ORAL 2 TIMES DAILY
Qty: 60 TABLET | Refills: 0 | Status: SHIPPED | OUTPATIENT
Start: 2025-01-24 | End: 2025-02-23

## 2025-01-24 RX ORDER — OLANZAPINE 2.5 MG/1
2.5 TABLET, FILM COATED ORAL 2 TIMES DAILY PRN
Qty: 30 TABLET | Refills: 0 | Status: SHIPPED | OUTPATIENT
Start: 2025-01-24 | End: 2025-02-23

## 2025-01-24 RX ADMIN — FLUOXETINE HYDROCHLORIDE 20 MG: 20 CAPSULE ORAL at 08:39

## 2025-01-24 RX ADMIN — DIPHENHYDRAMINE HYDROCHLORIDE 50 MG: 50 CAPSULE ORAL at 13:45

## 2025-01-24 RX ADMIN — LIDOCAINE 1 PATCH: 4 PATCH TOPICAL at 08:48

## 2025-01-24 RX ADMIN — HYDROXYZINE HYDROCHLORIDE 25 MG: 25 TABLET, FILM COATED ORAL at 12:11

## 2025-01-24 RX ADMIN — OLANZAPINE 7.5 MG: 5 TABLET, FILM COATED ORAL at 08:39

## 2025-01-24 RX ADMIN — LORATADINE 10 MG: 10 TABLET ORAL at 08:41

## 2025-01-24 RX ADMIN — NICOTINE POLACRILEX 2 MG: 2 LOZENGE ORAL at 10:14

## 2025-01-24 RX ADMIN — OLANZAPINE 2.5 MG: 2.5 TABLET, FILM COATED ORAL at 10:14

## 2025-01-24 RX ADMIN — NICOTINE POLACRILEX 2 MG: 2 LOZENGE ORAL at 13:45

## 2025-01-24 RX ADMIN — NICOTINE POLACRILEX 2 MG: 2 LOZENGE ORAL at 08:48

## 2025-01-24 ASSESSMENT — ACTIVITIES OF DAILY LIVING (ADL)
ADLS_ACUITY_SCORE: 41

## 2025-01-24 NOTE — PLAN OF CARE
"  Problem: Anxiety  Goal: Anxiety Reduction or Resolution  Outcome: Not Progressing        Problem: Depression  Goal: Decreased Depression Symptoms  Outcome: Progressing     Problem: Suicidal Behavior  Goal: Suicidal Behavior is Absent or Managed  Outcome: Progressing   Goal Outcome Evaluation:     Pt was isolated and withdrawn to her room majority of the shift. Pt only came out for meals. Affect blunted and flat. Mood angry and irritated upon assessment. Endorse right knee pain, dismissive of all other mental health and psych symptoms. Writer offered  PRN pain medication but pt refused and asked writer to \"get out of my room.\" Po intake adequate. Pt was compliant with medications. No safety concerns. No behavioral outburst.  "

## 2025-01-24 NOTE — PROGRESS NOTES
Rehab Group     Start time: 1115  End time: 1415  Patient time total: 45 minutes     came in and out of group session     #7 attended   Group Type: music   Group Topic Covered: coping skills, relaxation , self-care, self-esteem, sensory intervention, and social skills      Group Session Detail:  Rhythm Band, ORBA and Mindfulness      Patient Response/Contribution:  worked intermittently      Patient Detail:  Presented as self-focused and somewhat absent-minded in sessions today.  States is anticipating discharge, and so may be distracted.  Invested in group process while in attendance.        Activity Therapy Per 15 min ()    Patient Active Problem List   Diagnosis    Homeless single person    Injury of right knee    Ulcer of esophagus with bleeding    Major depressive disorder, recurrent episode, moderate (H)    Polysubstance abuse (H)    Bipolar 1 disorder (H)

## 2025-01-24 NOTE — PLAN OF CARE
BEH IP Unit Acuity Rating Score (UARS)  Patient is given one point for every criteria they meet.    CRITERIA SCORING   On a 72 hour hold, court hold, committed, stay of commitment, or revocation. 0    Patient LOS on BEH unit exceeds 20 days. 0  LOS: 7   Patient under guardianship, 55+, otherwise medically complex, or under age 11. 1   Suicide ideation without relief of precipitating factors. 1   Current plan for suicide. 0   Current plan for homicide. 0   Imminent risk or actual attempt to seriously harm another without relief of factors precipitating the attempt. 0   Severe dysfunction in daily living (ex: complete neglect for self care, extreme disruption in vegetative function, extreme deterioration in social interactions). 1   Recent (last 7 days) or current physical aggression in the ED or on unit. 0   Restraints or seclusion episode in past 72 hours. 0   Recent (last 7 days) or current verbal aggression, agitation, yelling, etc., while in the ED or unit. 0   Active psychosis. 0   Need for constant or near constant redirection (from leaving, from others, etc).  0   Intrusive or disruptive behaviors. 0   Patient requires 3 or more hours of individualized nursing care per 8-hour shift (i.e. for ADLs, meds, therapeutic interventions). 0   TOTAL 3

## 2025-01-24 NOTE — PLAN OF CARE
Pt to discharge to [facility] as per orders, to be picked up by can at about 2:30.    Pt is alert and oriented, She denied SI and hallucinations, endorsed anxiety and mild depression. Pt discharge paper work discussed with her and she did not have any questions. Pt discharge medications and belongings given to her , all signed out for.  Pt coping mechanisms are: writing, drawing and painting. Pt says thaT she does not have support system in place.     Pt did not want to retrieve the 2 pocket knives she came in with. Security notified.

## 2025-01-24 NOTE — PLAN OF CARE
Problem: Sleep Disturbance  Goal: Adequate Sleep/Rest  Outcome: Progressing   Goal Outcome Evaluation:    Pt appears to have slept for 6.25 hours. She denies pain/discomfort. No PRNs given or requested. No behavioral or safety concern at this time.    Blood pressure 116/69, pulse 100, temperature 98.5  F (36.9  C), temperature source Oral, resp. rate 18, weight 64 kg (141 lb 3.2 oz), SpO2 96%.

## 2025-01-24 NOTE — DISCHARGE SUMMARY
"                                                                                                                 ----------------------------------------------------------------------------------------------------------  [facility]   Psychiatric Discharge Summary      Jody Barrow MRN# [id number]   Age: 59 year old YOB: 1965     Date of Admission:  1/17/2025  Date of Discharge:  1/24/2025  Admitting Physician:  Coy Vasquez MD  Discharge Physician:  Coy Vasquez MD    This document serves as a transfer of care to Jody Barrow's outpatient providers.     Events Leading to Hospitalization:     Jody Barrow is a 59 year old female with previous psychiatric diagnoses of MDD, GASTON admitted from the ER on 01/17/2025 due to concern for SI, HI, and aggression in the context of substance use.     [facility]/DEC Assessment:   Pt presents to ED via EMS for concerns of altered mental status, agitation, and depressive and anxious symptoms. Pt is homeless, and per triage note, police were called to a [facility] after bystanders became concerned about pt's behavior. She reported to police and paramedics that she was having thoughts of hurting other people and has been for several weeks. Upon assessment, pt is oriented to person and day of the week, but not to place or date. Pt was sitting on the floor of her room in the corner crying and asked  \"what am I doing here?\".  Pt spoke rapidly and mumbled, making it difficult to gather history. Pt also reported that she did not know the answers to several of writer's questions, including if she had any current services. She also reported she did not know what happened at [place] prior to coming in.       It is the recommendation of this clinician that pt admit to [facility] for safety and stabilization. Pt presents to ED via EMS for concerns of altered mental status, agitation, and depressive and anxious symptoms. Pt " endorses following risk factors that make inpatient level of care appropriate: not oriented to place, date, situation and cannot remember why she was brought to ED, lack of support system, no outpatient services in place, generalized thoughts of harm to others,  and was unable to safety plan. Pt is in agreement with plan.      ED/Hospital Course:  Jody Barrow was medically cleared for admission to inpatient psychiatric unit. In the ED, Behavorial emergency response code called on 1/15. The patient's laboratory tests were reassuring. The patient's urine drug screen was positive for amphetamines. She received hydroxyzine and Zyprexa likely for agitation.     See H&P by Coy Vasquez MD PhD on 1/17/2025 for additional details.      Diagnoses:   Primary Psychiatric Diagnosis  Substance Induced Psychosis    Secondary Psychiatric Diagnoses  Major Depressive Disorder  Generalized Anxiety Disorder    Psychiatric Assessment:   Jody Barrow is a 59 year old female previously diagnosed with MDD, GASTON alcohol and methamphetamine use who presented voluntarily with HI and MI in the context of housing instability and alcohol and methamphetamine use. Most recent psychiatric hospitalization was 11/2022. Significant symptoms on admission include depressed mood and guardedness. The MSE on admission was pertinent for flat sad mood. Biological contributions to mental health presentation include MDD, GASTON, and alcohol and methamphetamine use. Psychological contributions to mental health presentation include history of trauma, and distrust of others. Social factors contributing to mental health presentation include lack of close connections, turbulence in relationship with boyfriend and housing instability, lack of stable income. Protective factors voluntarily involving herself in inpatient treatment.      In summary, the patient's reported symptoms of depressed mood and social isolation in the context of her recent HI, SI, and  substance use are consistent MDD and/vs substance induced psychosis. She benefited from medication management and treatment and support resource coordination and facilitation this admission.     Given that she had passive SI and HI, patient warranted inpatient psychiatric hospitalization to maintain her safety.     Psychiatric Hospital Course:     Jody Barrow was admitted to [facility] as a voluntary patient. The patient was placed under status 15 (15 minute checks) to ensure patient safety.  Medication Trials and Changes: risks, benefits, alternatives, and side effects were discussed and understood by the patient.  New medications started at the time of admission include:   Scheduled  Zyprexa 7.5 mg BID  PRN  Hydroxyzine 25 mg for anxiety   Trazodone 50 mg for  sleep   Zyprexa 10 mg for agitation   01/20 diphenhydramine, epi-pen, and albuterol made available PRN for allergies  01/21 added 2.5 mg olanzapine PRN for anxiety  01/22 Start fluoxetine 10 mg daily for MDD   01/23 Increased fluoxetine to 20 mg daily.    Level of medication adherence: Good  Behaviors: The patient was safe and appropriate and did not require chemical/physical restraints during admission. She was cooperative with cares, had some group attendance, and was visible in the milieu.  Change in psychiatric symptoms: Over the course of this hospitalization the patient's symptoms of psychosis resolved; depressive symptoms including passive SI (resolved), anhedonia, worthlessness, hopelessness improved since starting Prozac. She tolerated 10 mg of Prozac with no issues and was up-titrated to 20 mg.   Jody was transfered to  [facility], [facility] . At the time of discharge she was determined to not be a danger to herself or others.     Risk Assessment:   Today Jody Barrow denies SI/HI/AH/VH. During the interview on the day of discharge, she reports of feeling good on the current regimen, denies any side effects. Notes of feeling stressed  "about the uncertainty but says it's manageable.      Notes of intermittent transparent blue and purple shades in her vision, says these changes have been chronic in nature, typically resolves after blinking a few times. Reports of hx pituitary tumor. Discussed with pt the importance of following up with PCP upon discharge for a repeat scan. Otherwise, she has notable risk factors for self-harm, including single status, anxiety, psychosis, and substance abuse. However, risk is mitigated by history of seeking help when needed. Therefore, based on all available evidence including the factors cited above, she does not appear to be at imminent risk for self-harm, does not meet criteria for a 72-hr hold, and therefore remains appropriate for ongoing outpatient level of care. Additional steps taken to minimize risk include: medication optimization, close psychiatric follow up. Voluntary referral for CD treatment at [facility] was offered, she accepted this offer.     Psychiatric Examination:     Mental Status Exam:  Oriented to:  Grossly Oriented  General:  Awake and Alert  Appearance:  appears stated age, Grooming is adequate, and Dressed in scrubs  Behavior/Attitude:  Calm, Cooperative, and Engaged  Eye Contact: Appropriate  Psychomotor: Normal and No evidence of tics, dystonia, or tardive dyskinesia  no catatonia present  Speech:  appropriate volume/tone  Language: Fluent in English with appropriate syntax and vocabulary.  Mood:  \"Well\"  Affect:  appropriate, congruent with mood, and stable  Thought Process:  coherent  Thought Content:   No SI/HI/AH/VH; No apparent delusions  Associations:  intact  Insight:  fair   Judgment:  fair   Impulse control: fair  Attention Span:  adequate for conversation  Concentration:  grossly intact  Recent and Remote Memory:  not formally assessed  Fund of Knowledge: average  Muscle Strength and Tone: normal  Gait and Station: Normal     Medical Hospital Course:   Jody Barrow was " medically cleared by the ED prior to admission to the unit..     Medical Diagnoses addressed:  # Labial BP  # Diaphoresis  # Flushing  # Tingling in upper extremities  Likely 2/2 to anxiety. CBC, CMP, TSH, B12, folate WNL.     # Pruritus  Potentially psychosomatic or 2/2 to anxiety, will treat with 2nd gen antihistamine for potential seasonal allergies and reduce utilization of benadryl and make hydrocortisone cream available PRN.  - Hydrocortisone 0.5 % cream 3 x daily PRN  - Start loratadine 10 mg daily     # Non-cardiac chest pain  Left lateral chest, feels like muscle spasm, no rash, no pain elicited on palpation. Pain does not vary with activity. Described as feeling like a bruise.   - EKG unchanged from admission     # Epistaxis  Mild nose bleed in shower. Resolved spontaneously.   - Saline nasal spray PRN     # Knee pain  # Hip Pain  Described grinding sensation.   - Naproxen 250 mg PRN     # Asthma   - Albuterol PRN     # Food allergy  - Diphenhydramine PRN  - Epi-pen PRN    # Left Sided Possible Pituitary Microadenoma  Equivocal visualization on dedicated MRI in 2013. Not re-visualized on MRI in 2022. Asymptomatic. Endorses bluish tinge to vision on day of discharge 01/24/2025.  - TSH WNL  - Pt encouraged to follow up with PCP for a repeat MRI      Consults: none    Labs were notable for the following:  - CBC remarkable for hemoglobin of 16  - CMP remarkable for ALT 61 and AST 49  - UDS positive for amphetamine  - TSH unremarkable  - Folate unremarkable  - Vitamin B12 WNL  - Repeat EKG unchanged     Discharge Medications:     Current Discharge Medication List        CONTINUE these medications which have NOT CHANGED    Details   albuterol (PROAIR HFA/PROVENTIL HFA/VENTOLIN HFA) 108 (90 Base) MCG/ACT inhaler Inhale 2 puffs into the lungs every 4 hours as needed      EPINEPHrine (ANY BX GENERIC EQUIV) 0.3 MG/0.3ML injection 2-pack Inject 0.3 mg into the muscle as needed              Discharge Plan:    Medications:  Olanzapine 7.5 mg BID  Prozac 20 mg   Referrals: residential CD treatment at [facility]     Attestations:      Ariel Chang, MS3  Medical Student  [facility]    Resident/Fellow Attestation   I, Karly Nicholas DO, was present with the medical/VARINDER student who participated in the service and in the documentation of the note.  I have verified the history and personally performed the physical exam and medical decision making.  I agree with the assessment and plan of care as documented in the note.      Karly Nicholas DO  PGY1  Date of Service (when I saw the patient): 25    Attestation:   The patient has been seen and evaluated by me,  Coy Vasquez MD. I have examined the patient today and reviewed the discharge plan with the resident and/or medical student. I agree with the final assessment and plan, as noted in the discharge summary. I have reviewed today's vital signs, medications, labs and imaging.  Total time discharge plannin minutes  Coy Vasquez MD ,Ph.D.

## 2025-01-24 NOTE — PLAN OF CARE
Team Note Due:  Tuesday    Assessment/Intervention/Current Symtoms and Care Coordination:  Chart review and met with team, discussed pt progress, symptomology, and response to treatment.  Discussed the discharge plan and any potential impediments to discharge.    [facility] able to accommodate admission today. Pt in agreement with discharge today. [facility] will  pt at [facility] between 1:30-2pm. MD and RN udpated.    AVS completed.     Discharge Plan or Goal:  Residential CD treatment     Barriers to Discharge:  N/A     Referral Status:  [facility] ([facility]     Legal Status:  Voluntary    Contacts:  N/A     Upcoming Meetings and Dates/Important Information and next steps:

## 2025-01-27 LAB
ATRIAL RATE - MUSE: 90 BPM
DIASTOLIC BLOOD PRESSURE - MUSE: NORMAL MMHG
INTERPRETATION ECG - MUSE: NORMAL
P AXIS - MUSE: 69 DEGREES
PR INTERVAL - MUSE: 146 MS
QRS DURATION - MUSE: 70 MS
QT - MUSE: 370 MS
QTC - MUSE: 452 MS
R AXIS - MUSE: -11 DEGREES
SYSTOLIC BLOOD PRESSURE - MUSE: NORMAL MMHG
T AXIS - MUSE: -3 DEGREES
VENTRICULAR RATE- MUSE: 90 BPM

## 2025-08-12 ENCOUNTER — HOSPITAL ENCOUNTER (EMERGENCY)
Facility: HOSPITAL | Age: 60
Discharge: HOME | End: 2025-08-12
Attending: EMERGENCY MEDICINE | Admitting: EMERGENCY MEDICINE
Payer: COMMERCIAL

## 2025-08-12 ENCOUNTER — APPOINTMENT (OUTPATIENT)
Dept: CT IMAGING | Facility: HOSPITAL | Age: 60
End: 2025-08-12
Attending: EMERGENCY MEDICINE
Payer: COMMERCIAL

## 2025-08-12 VITALS
DIASTOLIC BLOOD PRESSURE: 69 MMHG | SYSTOLIC BLOOD PRESSURE: 127 MMHG | TEMPERATURE: 98.6 F | BODY MASS INDEX: 28.66 KG/M2 | OXYGEN SATURATION: 98 % | HEIGHT: 65 IN | WEIGHT: 172 LBS | HEART RATE: 68 BPM | RESPIRATION RATE: 22 BRPM

## 2025-08-12 DIAGNOSIS — K27.9 PEPTIC ULCER: Primary | ICD-10-CM

## 2025-08-12 DIAGNOSIS — N83.202 LEFT OVARIAN CYST: ICD-10-CM

## 2025-08-12 DIAGNOSIS — R10.13 EPIGASTRIC PAIN: ICD-10-CM

## 2025-08-12 LAB
ALBUMIN SERPL BCG-MCNC: 4.4 G/DL (ref 3.5–5.2)
ALP SERPL-CCNC: 54 U/L (ref 40–150)
ALT SERPL W P-5'-P-CCNC: 24 U/L (ref 0–50)
ANION GAP SERPL CALCULATED.3IONS-SCNC: 12 MMOL/L (ref 7–15)
AST SERPL W P-5'-P-CCNC: 22 U/L (ref 0–45)
BASOPHILS # BLD AUTO: 0.04 10E3/UL (ref 0–0.2)
BASOPHILS NFR BLD AUTO: 0.6 %
BILIRUB DIRECT SERPL-MCNC: 0.18 MG/DL (ref 0–0.3)
BILIRUB SERPL-MCNC: 0.4 MG/DL
BUN SERPL-MCNC: 13.6 MG/DL (ref 6–20)
CALCIUM SERPL-MCNC: 9.2 MG/DL (ref 8.8–10.4)
CHLORIDE SERPL-SCNC: 104 MMOL/L (ref 98–107)
CREAT SERPL-MCNC: 0.78 MG/DL (ref 0.51–0.95)
EGFRCR SERPLBLD CKD-EPI 2021: 88 ML/MIN/1.73M2
EOSINOPHIL # BLD AUTO: 0.21 10E3/UL (ref 0–0.7)
EOSINOPHIL NFR BLD AUTO: 3.2 %
ERYTHROCYTE [DISTWIDTH] IN BLOOD BY AUTOMATED COUNT: 13.1 % (ref 10–15)
ETHANOL SERPL-MCNC: <0.01 G/DL
GLUCOSE SERPL-MCNC: 93 MG/DL (ref 70–99)
HCO3 SERPL-SCNC: 23 MMOL/L (ref 22–29)
HCT VFR BLD AUTO: 39.3 % (ref 35–47)
HGB BLD-MCNC: 13.1 G/DL (ref 11.7–15.7)
IMM GRANULOCYTES # BLD: 0.03 10E3/UL
IMM GRANULOCYTES NFR BLD: 0.5 %
LACTATE SERPL-SCNC: 0.8 MMOL/L (ref 0.7–2)
LIPASE SERPL-CCNC: 45 U/L (ref 13–60)
LYMPHOCYTES # BLD AUTO: 2.11 10E3/UL (ref 0.8–5.3)
LYMPHOCYTES NFR BLD AUTO: 32.1 %
MAGNESIUM SERPL-MCNC: 1.9 MG/DL (ref 1.7–2.3)
MCH RBC QN AUTO: 31 PG (ref 26.5–33)
MCHC RBC AUTO-ENTMCNC: 33.3 G/DL (ref 31.5–36.5)
MCV RBC AUTO: 92.9 FL (ref 78–100)
MONOCYTES # BLD AUTO: 0.51 10E3/UL (ref 0–1.3)
MONOCYTES NFR BLD AUTO: 7.8 %
NEUTROPHILS # BLD AUTO: 3.68 10E3/UL (ref 1.6–8.3)
NEUTROPHILS NFR BLD AUTO: 55.8 %
NRBC # BLD AUTO: 0 10E3/UL
NRBC BLD AUTO-RTO: 0 /100
PLATELET # BLD AUTO: 290 10E3/UL (ref 150–450)
POTASSIUM SERPL-SCNC: 3.9 MMOL/L (ref 3.4–5.3)
PROT SERPL-MCNC: 7.3 G/DL (ref 6.4–8.3)
RADIOLOGIST FLAGS: NORMAL
RBC # BLD AUTO: 4.23 10E6/UL (ref 3.8–5.2)
SODIUM SERPL-SCNC: 139 MMOL/L (ref 135–145)
TROPONIN T SERPL HS-MCNC: 7 NG/L
TROPONIN T SERPL HS-MCNC: <6 NG/L
WBC # BLD AUTO: 6.58 10E3/UL (ref 4–11)

## 2025-08-12 PROCEDURE — 99285 EMERGENCY DEPT VISIT HI MDM: CPT | Mod: 25 | Performed by: EMERGENCY MEDICINE

## 2025-08-12 PROCEDURE — 85018 HEMOGLOBIN: CPT | Performed by: EMERGENCY MEDICINE

## 2025-08-12 PROCEDURE — 82040 ASSAY OF SERUM ALBUMIN: CPT | Performed by: EMERGENCY MEDICINE

## 2025-08-12 PROCEDURE — 74177 CT ABD & PELVIS W/CONTRAST: CPT

## 2025-08-12 PROCEDURE — 83605 ASSAY OF LACTIC ACID: CPT | Performed by: EMERGENCY MEDICINE

## 2025-08-12 PROCEDURE — 82077 ASSAY SPEC XCP UR&BREATH IA: CPT | Performed by: EMERGENCY MEDICINE

## 2025-08-12 PROCEDURE — 250N000011 HC RX IP 250 OP 636: Performed by: EMERGENCY MEDICINE

## 2025-08-12 PROCEDURE — 83690 ASSAY OF LIPASE: CPT | Performed by: EMERGENCY MEDICINE

## 2025-08-12 PROCEDURE — 96375 TX/PRO/DX INJ NEW DRUG ADDON: CPT

## 2025-08-12 PROCEDURE — 82310 ASSAY OF CALCIUM: CPT | Performed by: EMERGENCY MEDICINE

## 2025-08-12 PROCEDURE — 84484 ASSAY OF TROPONIN QUANT: CPT | Performed by: EMERGENCY MEDICINE

## 2025-08-12 PROCEDURE — 96374 THER/PROPH/DIAG INJ IV PUSH: CPT | Mod: 59

## 2025-08-12 PROCEDURE — 36415 COLL VENOUS BLD VENIPUNCTURE: CPT | Performed by: EMERGENCY MEDICINE

## 2025-08-12 PROCEDURE — 96376 TX/PRO/DX INJ SAME DRUG ADON: CPT

## 2025-08-12 PROCEDURE — 83735 ASSAY OF MAGNESIUM: CPT | Performed by: EMERGENCY MEDICINE

## 2025-08-12 RX ORDER — IOPAMIDOL 755 MG/ML
90 INJECTION, SOLUTION INTRAVASCULAR ONCE
Status: COMPLETED | OUTPATIENT
Start: 2025-08-12 | End: 2025-08-12

## 2025-08-12 RX ORDER — ONDANSETRON 4 MG/1
4 TABLET, ORALLY DISINTEGRATING ORAL EVERY 8 HOURS PRN
Qty: 10 TABLET | Refills: 0 | Status: SHIPPED | OUTPATIENT
Start: 2025-08-12 | End: 2025-08-15

## 2025-08-12 RX ORDER — MORPHINE SULFATE 4 MG/ML
4 INJECTION, SOLUTION INTRAMUSCULAR; INTRAVENOUS ONCE
Refills: 0 | Status: COMPLETED | OUTPATIENT
Start: 2025-08-12 | End: 2025-08-12

## 2025-08-12 RX ORDER — MORPHINE SULFATE 4 MG/ML
4 INJECTION, SOLUTION INTRAMUSCULAR; INTRAVENOUS ONCE
Status: COMPLETED | OUTPATIENT
Start: 2025-08-12 | End: 2025-08-12

## 2025-08-12 RX ORDER — PANTOPRAZOLE SODIUM 40 MG/1
40 TABLET, DELAYED RELEASE ORAL DAILY
Qty: 30 TABLET | Refills: 0 | Status: SHIPPED | OUTPATIENT
Start: 2025-08-12 | End: 2025-09-11

## 2025-08-12 RX ORDER — FAMOTIDINE 20 MG/1
20 TABLET, FILM COATED ORAL 2 TIMES DAILY
Qty: 28 TABLET | Refills: 0 | Status: SHIPPED | OUTPATIENT
Start: 2025-08-12 | End: 2025-08-26

## 2025-08-12 RX ORDER — SUCRALFATE 1 G/1
1 TABLET ORAL 4 TIMES DAILY
Qty: 56 TABLET | Refills: 0 | Status: SHIPPED | OUTPATIENT
Start: 2025-08-12 | End: 2025-08-26

## 2025-08-12 RX ADMIN — FAMOTIDINE 20 MG: 10 INJECTION, SOLUTION INTRAVENOUS at 17:06

## 2025-08-12 RX ADMIN — MORPHINE SULFATE 4 MG: 4 INJECTION, SOLUTION INTRAMUSCULAR; INTRAVENOUS at 21:36

## 2025-08-12 RX ADMIN — MORPHINE SULFATE 4 MG: 4 INJECTION, SOLUTION INTRAMUSCULAR; INTRAVENOUS at 19:58

## 2025-08-12 RX ADMIN — PANTOPRAZOLE SODIUM 40 MG: 40 INJECTION, POWDER, FOR SOLUTION INTRAVENOUS at 17:04

## 2025-08-12 RX ADMIN — IOPAMIDOL 90 ML: 755 INJECTION, SOLUTION INTRAVENOUS at 20:40

## 2025-08-12 ASSESSMENT — ACTIVITIES OF DAILY LIVING (ADL)
ADLS_ACUITY_SCORE: 54
ADLS_ACUITY_SCORE: 54

## 2025-08-12 ASSESSMENT — COLUMBIA-SUICIDE SEVERITY RATING SCALE - C-SSRS
6. HAVE YOU EVER DONE ANYTHING, STARTED TO DO ANYTHING, OR PREPARED TO DO ANYTHING TO END YOUR LIFE?: NO
2. HAVE YOU ACTUALLY HAD ANY THOUGHTS OF KILLING YOURSELF IN THE PAST MONTH?: NO
1. IN THE PAST MONTH, HAVE YOU WISHED YOU WERE DEAD OR WISHED YOU COULD GO TO SLEEP AND NOT WAKE UP?: NO

## 2025-08-13 ENCOUNTER — DOCUMENTATION ONLY (OUTPATIENT)
Dept: OTHER | Facility: CLINIC | Age: 60
End: 2025-08-13
Payer: COMMERCIAL